# Patient Record
Sex: FEMALE | Race: WHITE | Employment: OTHER | URBAN - NONMETROPOLITAN AREA
[De-identification: names, ages, dates, MRNs, and addresses within clinical notes are randomized per-mention and may not be internally consistent; named-entity substitution may affect disease eponyms.]

---

## 2017-07-12 ENCOUNTER — OFFICE VISIT (OUTPATIENT)
Dept: ORTHOPEDIC SURGERY | Age: 67
End: 2017-07-12

## 2017-07-12 VITALS
WEIGHT: 157 LBS | DIASTOLIC BLOOD PRESSURE: 78 MMHG | SYSTOLIC BLOOD PRESSURE: 123 MMHG | BODY MASS INDEX: 26.8 KG/M2 | HEIGHT: 64 IN

## 2017-07-12 DIAGNOSIS — M25.512 ACUTE PAIN OF LEFT SHOULDER: Primary | ICD-10-CM

## 2017-07-12 PROCEDURE — 73030 X-RAY EXAM OF SHOULDER: CPT | Performed by: ORTHOPAEDIC SURGERY

## 2017-07-12 PROCEDURE — 99204 OFFICE O/P NEW MOD 45 MIN: CPT | Performed by: ORTHOPAEDIC SURGERY

## 2017-07-12 RX ORDER — DIAZEPAM 5 MG
5 TABLET ORAL SEE ADMIN INSTRUCTIONS
Qty: 5 TABLET | Refills: 0 | Status: SHIPPED | OUTPATIENT
Start: 2017-07-12 | End: 2017-07-13

## 2017-07-12 RX ORDER — IBUPROFEN 400 MG/1
400 TABLET ORAL EVERY 6 HOURS PRN
COMMUNITY
Start: 2017-05-30 | End: 2018-03-06

## 2017-07-12 RX ORDER — ZOLPIDEM TARTRATE 10 MG/1
TABLET ORAL
COMMUNITY
Start: 2017-07-01 | End: 2018-03-06

## 2017-07-12 RX ORDER — METHOCARBAMOL 750 MG/1
TABLET, FILM COATED ORAL 3 TIMES DAILY PRN
COMMUNITY
Start: 2017-07-01 | End: 2018-10-02

## 2017-07-12 RX ORDER — HYDROCODONE BITARTRATE AND ACETAMINOPHEN 10; 325 MG/1; MG/1
TABLET ORAL
COMMUNITY
Start: 2017-05-31 | End: 2017-10-02 | Stop reason: ALTCHOICE

## 2017-07-13 DIAGNOSIS — M25.512 ACUTE PAIN OF LEFT SHOULDER: Primary | ICD-10-CM

## 2017-07-17 ENCOUNTER — OFFICE VISIT (OUTPATIENT)
Dept: ORTHOPEDIC SURGERY | Age: 67
End: 2017-07-17

## 2017-07-17 VITALS — BODY MASS INDEX: 26.8 KG/M2 | HEIGHT: 64 IN | WEIGHT: 157 LBS

## 2017-07-17 DIAGNOSIS — M75.122 COMPLETE TEAR OF LEFT ROTATOR CUFF: ICD-10-CM

## 2017-07-17 DIAGNOSIS — S43.432D SUPERIOR GLENOID LABRUM LESION OF LEFT SHOULDER, SUBSEQUENT ENCOUNTER: Primary | ICD-10-CM

## 2017-07-17 PROBLEM — S43.432A SUPERIOR GLENOID LABRUM LESION OF LEFT SHOULDER: Status: ACTIVE | Noted: 2017-07-17

## 2017-07-17 PROCEDURE — 1090F PRES/ABSN URINE INCON ASSESS: CPT | Performed by: ORTHOPAEDIC SURGERY

## 2017-07-17 PROCEDURE — G8427 DOCREV CUR MEDS BY ELIG CLIN: HCPCS | Performed by: ORTHOPAEDIC SURGERY

## 2017-07-17 PROCEDURE — 99214 OFFICE O/P EST MOD 30 MIN: CPT | Performed by: ORTHOPAEDIC SURGERY

## 2017-07-17 PROCEDURE — 4040F PNEUMOC VAC/ADMIN/RCVD: CPT | Performed by: ORTHOPAEDIC SURGERY

## 2017-07-17 PROCEDURE — 1123F ACP DISCUSS/DSCN MKR DOCD: CPT | Performed by: ORTHOPAEDIC SURGERY

## 2017-07-17 PROCEDURE — 3017F COLORECTAL CA SCREEN DOC REV: CPT | Performed by: ORTHOPAEDIC SURGERY

## 2017-07-17 PROCEDURE — G8399 PT W/DXA RESULTS DOCUMENT: HCPCS | Performed by: ORTHOPAEDIC SURGERY

## 2017-07-17 PROCEDURE — G8419 CALC BMI OUT NRM PARAM NOF/U: HCPCS | Performed by: ORTHOPAEDIC SURGERY

## 2017-07-17 PROCEDURE — 3014F SCREEN MAMMO DOC REV: CPT | Performed by: ORTHOPAEDIC SURGERY

## 2017-07-17 PROCEDURE — 1036F TOBACCO NON-USER: CPT | Performed by: ORTHOPAEDIC SURGERY

## 2017-07-31 RX ORDER — MELOXICAM 15 MG/1
15 TABLET ORAL DAILY
Qty: 30 TABLET | Refills: 3 | Status: SHIPPED | OUTPATIENT
Start: 2017-07-31 | End: 2017-12-11 | Stop reason: ALTCHOICE

## 2017-08-21 ENCOUNTER — OFFICE VISIT (OUTPATIENT)
Dept: ORTHOPEDIC SURGERY | Age: 67
End: 2017-08-21

## 2017-08-21 VITALS — HEIGHT: 64 IN | WEIGHT: 157 LBS | BODY MASS INDEX: 26.8 KG/M2

## 2017-08-21 DIAGNOSIS — M75.122 COMPLETE TEAR OF LEFT ROTATOR CUFF: ICD-10-CM

## 2017-08-21 DIAGNOSIS — S43.432D SUPERIOR GLENOID LABRUM LESION OF LEFT SHOULDER, SUBSEQUENT ENCOUNTER: Primary | ICD-10-CM

## 2017-08-21 PROCEDURE — 1090F PRES/ABSN URINE INCON ASSESS: CPT | Performed by: ORTHOPAEDIC SURGERY

## 2017-08-21 PROCEDURE — 3017F COLORECTAL CA SCREEN DOC REV: CPT | Performed by: ORTHOPAEDIC SURGERY

## 2017-08-21 PROCEDURE — 20610 DRAIN/INJ JOINT/BURSA W/O US: CPT | Performed by: ORTHOPAEDIC SURGERY

## 2017-08-21 PROCEDURE — G8399 PT W/DXA RESULTS DOCUMENT: HCPCS | Performed by: ORTHOPAEDIC SURGERY

## 2017-08-21 PROCEDURE — G8427 DOCREV CUR MEDS BY ELIG CLIN: HCPCS | Performed by: ORTHOPAEDIC SURGERY

## 2017-08-21 PROCEDURE — 1036F TOBACCO NON-USER: CPT | Performed by: ORTHOPAEDIC SURGERY

## 2017-08-21 PROCEDURE — 99214 OFFICE O/P EST MOD 30 MIN: CPT | Performed by: ORTHOPAEDIC SURGERY

## 2017-08-21 PROCEDURE — G8419 CALC BMI OUT NRM PARAM NOF/U: HCPCS | Performed by: ORTHOPAEDIC SURGERY

## 2017-08-21 PROCEDURE — 3014F SCREEN MAMMO DOC REV: CPT | Performed by: ORTHOPAEDIC SURGERY

## 2017-08-21 PROCEDURE — 4040F PNEUMOC VAC/ADMIN/RCVD: CPT | Performed by: ORTHOPAEDIC SURGERY

## 2017-08-21 PROCEDURE — 1123F ACP DISCUSS/DSCN MKR DOCD: CPT | Performed by: ORTHOPAEDIC SURGERY

## 2017-08-31 ENCOUNTER — TELEPHONE (OUTPATIENT)
Dept: ORTHOPEDIC SURGERY | Age: 67
End: 2017-08-31

## 2017-09-26 ENCOUNTER — HOSPITAL ENCOUNTER (OUTPATIENT)
Dept: SURGERY | Age: 67
Discharge: HOME | End: 2017-08-31
Attending: ORTHOPAEDIC SURGERY | Admitting: ORTHOPAEDIC SURGERY

## 2017-10-02 ENCOUNTER — OFFICE VISIT (OUTPATIENT)
Dept: ORTHOPEDIC SURGERY | Age: 67
End: 2017-10-02

## 2017-10-02 VITALS
SYSTOLIC BLOOD PRESSURE: 123 MMHG | WEIGHT: 156.97 LBS | DIASTOLIC BLOOD PRESSURE: 72 MMHG | BODY MASS INDEX: 26.8 KG/M2 | HEART RATE: 73 BPM | HEIGHT: 64 IN

## 2017-10-02 DIAGNOSIS — M54.2 NECK PAIN: ICD-10-CM

## 2017-10-02 DIAGNOSIS — M48.02 CERVICAL SPINAL STENOSIS: ICD-10-CM

## 2017-10-02 DIAGNOSIS — M50.30 DDD (DEGENERATIVE DISC DISEASE), CERVICAL: Primary | ICD-10-CM

## 2017-10-02 DIAGNOSIS — M47.812 SPONDYLOSIS OF CERVICAL REGION WITHOUT MYELOPATHY OR RADICULOPATHY: ICD-10-CM

## 2017-10-02 PROCEDURE — G8484 FLU IMMUNIZE NO ADMIN: HCPCS | Performed by: PHYSICAL MEDICINE & REHABILITATION

## 2017-10-02 PROCEDURE — G8399 PT W/DXA RESULTS DOCUMENT: HCPCS | Performed by: PHYSICAL MEDICINE & REHABILITATION

## 2017-10-02 PROCEDURE — 1123F ACP DISCUSS/DSCN MKR DOCD: CPT | Performed by: PHYSICAL MEDICINE & REHABILITATION

## 2017-10-02 PROCEDURE — 1036F TOBACCO NON-USER: CPT | Performed by: PHYSICAL MEDICINE & REHABILITATION

## 2017-10-02 PROCEDURE — 3014F SCREEN MAMMO DOC REV: CPT | Performed by: PHYSICAL MEDICINE & REHABILITATION

## 2017-10-02 PROCEDURE — G8417 CALC BMI ABV UP PARAM F/U: HCPCS | Performed by: PHYSICAL MEDICINE & REHABILITATION

## 2017-10-02 PROCEDURE — G8427 DOCREV CUR MEDS BY ELIG CLIN: HCPCS | Performed by: PHYSICAL MEDICINE & REHABILITATION

## 2017-10-02 PROCEDURE — 4040F PNEUMOC VAC/ADMIN/RCVD: CPT | Performed by: PHYSICAL MEDICINE & REHABILITATION

## 2017-10-02 PROCEDURE — 1090F PRES/ABSN URINE INCON ASSESS: CPT | Performed by: PHYSICAL MEDICINE & REHABILITATION

## 2017-10-02 PROCEDURE — 99203 OFFICE O/P NEW LOW 30 MIN: CPT | Performed by: PHYSICAL MEDICINE & REHABILITATION

## 2017-10-02 PROCEDURE — 3017F COLORECTAL CA SCREEN DOC REV: CPT | Performed by: PHYSICAL MEDICINE & REHABILITATION

## 2017-10-02 PROCEDURE — 72040 X-RAY EXAM NECK SPINE 2-3 VW: CPT | Performed by: PHYSICAL MEDICINE & REHABILITATION

## 2017-10-02 RX ORDER — HYDROCODONE BITARTRATE AND ACETAMINOPHEN 7.5; 325 MG/1; MG/1
TABLET ORAL
COMMUNITY
Start: 2017-09-27 | End: 2018-03-06

## 2017-10-02 RX ORDER — DIAZEPAM 5 MG/1
TABLET ORAL
Qty: 1 TABLET | Refills: 0 | Status: SHIPPED | OUTPATIENT
Start: 2017-10-02 | End: 2018-02-28

## 2017-10-02 NOTE — MR AVS SNAPSHOT
estimate of body fat, calculated from your height and weight. The higher your BMI, the greater your risk of heart disease, high blood pressure, type 2 diabetes, stroke, gallstones, arthritis, sleep apnea, and certain cancers. BMI is not perfect. It may overestimate body fat in athletes and people who are more muscular. If your body fat is high you can improve your BMI by decreasing your calorie intake and becoming more physically active. Learn more at: [employer]             Today's Medication Changes          These changes are accurate as of: 10/2/17  2:51 PM.  If you have any questions, ask your nurse or doctor. START taking these medications           diazepam 5 MG tablet   Commonly known as:  VALIUM   Instructions:  TAKE 1 TAB PO 30 MINUTES PRIOR TO MRI. Quantity:  1 tablet   Refills:  0   Started by: Myles Ashley MD            Where to Get Your Medications      You can get these medications from any pharmacy     Bring a paper prescription for each of these medications     diazepam 5 MG tablet               Your Current Medications Are              HYDROcodone-acetaminophen (NORCO) 7.5-325 MG per tablet . diazepam (VALIUM) 5 MG tablet TAKE 1 TAB PO 30 MINUTES PRIOR TO MRI.    meloxicam (MOBIC) 15 MG tablet Take 1 tablet by mouth daily    ibuprofen (ADVIL;MOTRIN) 400 MG tablet     methocarbamol (ROBAXIN) 750 MG tablet     zolpidem (AMBIEN) 10 MG tablet     DULoxetine (CYMBALTA) 30 MG capsule Take 30 mg by mouth daily. methocarbamol (ROBAXIN) 500 MG tablet Take 500 mg by mouth 4 times daily. Zolpidem Tartrate (AMBIEN PO) Take  by mouth.       Allergies              Codeine Nausea Only    Sulfa Antibiotics Hives      We Ordered/Performed the following           Cervical spine flexion and extension only     Comments:    [id number]    MRI Cervical Spine WO Contrast     Scheduling Instructions: MRI CSP W/O CONTRAST - [facility] 10/03/17 @ 9:00AM, ARRIVE 8:30  R/O DISC HERNIATION  NPR         Result Summary for Cervical spine flexion and extension only      Result Information     Status          Final result (Exam End: 10/2/2017  2:09 PM)           10/2/2017  2:09 PM      Narrative & Impression           Radiology result is complete; follow up with provider / physician office for radiology results                       Additional Information        Basic Information     Date Of Birth Sex Race Ethnicity Preferred Language    1950 Female White Non-/Non  English      Problem List as of 10/2/2017  Date Reviewed: 10/2/2017                Superior glenoid labrum lesion of left shoulder    Complete tear of left rotator cuff      Preventive Care        Date Due    Hepatitis C screening is recommended for all adults regardless of risk factors born between [facility] at least once (lifetime) who have never been tested. 1950    Tetanus Combination Vaccine (1 - Tdap) 8/1/1970    Diabetes Screening 8/1/1991    Colonoscopy 8/1/2001    Zoster Vaccine 8/1/2011    Cholesterol Screening 12/1/2015    Pneumococcal Vaccines (two) for all adults aged 72 and over (1 of 2 - PCV13) 8/1/2016    Yearly Flu Vaccine (1) 9/1/2017    Mammograms are recommended every 2 years for low/average risk patients aged 48 - 69, and every year for high risk patients per updated national guidelines. However these guidelines can be individualized by your provider. 10/19/2017            "[employer]" Signup           [employer] allows you to send messages to your doctor, view your test results, renew your prescriptions, schedule appointments, view visit notes, and more. How Do I Sign Up? 1. In your Internet browser, go to [email or web address]  2. Click on the Sign Up Now link in the Sign In box. You will see the New Member Sign Up page.

## 2017-10-02 NOTE — PROGRESS NOTES
EXAM:  · General Apparence: Patient is adequately groomed with no evidence of malnutrition. · Psychiatric: Orientation: The patient is oriented to time, place and person. The patient's mood and affect are appropriate   · Vascular: Examination reveals no swelling and palpation reveals no tenderness in upper or lower extremities. Good capillary refill. · The lymphatic examination of the neck, axillae and groin reveals all areas to be without enlargement or induration   Sensation is intact without deficit in the upper and lower extremities to light touch and pinprick  · Coordination of the upper and lower extremities are normal.    CERVICAL EXAMINATION:  · Inspection: Local inspection shows no step-off or bruising. Cervical alignment is normal. No instability is noted. · Palpation and Percussion: No evidence of tenderness at the midline, and trapezius. Paraspinal tenderness is not present. There is no paraspinal spasm. · Range of Motion:  limited by 25% in all planes due to pain   · Strength: 5/5 bilateral upper extremities  · Special Tests:   Spurling's and Yadav's are negative bilaterally. Kuo and Impingement tests are negative bilaterally. · Skin:There are no rashes, ulcerations or lesions in right & left upper extremities. · Reflexes: Bilaterally triceps, biceps and brachioradialis are 2+. Clonus absent bilaterally at the feet. No pathological reflexes are noted. · Gait & station: normal, patient ambulates without assistance  · Additional Examinations:  · RIGHT UPPER EXTREMITY:  Inspection/examination of the right upper extremity does not show any tenderness, deformity or injury. Range of motion is normal and pain-free. There is no gross instability. There are no rashes, ulcerations or lesions. Strength and tone are normal. No atrophy or abnormal movements are noted. · LEFT UPPER EXTREMITY: Inspection/examination of the left upper extremity does not show any tenderness, deformity or injury. hospital encounter of 09/06/14   CBC Auto Differential   Result Value Ref Range    WBC 9.2 4.0 - 11.0 K/uL    RBC 3.92 (L) 4.00 - 5.20 M/uL    Hemoglobin 12.8 12.0 - 16.0 g/dL    Hematocrit 37.3 36.0 - 48.0 %    MCV 95.2 80.0 - 100.0 fL    MCH 32.5 26.0 - 34.0 pg    MCHC 34.1 31.0 - 36.0 g/dL    RDW 13.4 12.4 - 15.4 %    Platelets 129 354 - 022 K/uL    MPV 7.8 5.0 - 10.5 fL    Neutrophils % 61.4 %    Lymphocytes % 27.6 %    Monocytes % 8.7 %    Eosinophils % 1.8 %    Basophils % 0.5 %    Neutrophils # 5.6 1.7 - 7.7 K/uL    Lymphocytes # 2.5 1.0 - 5.1 K/uL    Monocytes # 0.8 0.0 - 1.3 K/uL    Eosinophils # 0.2 0.0 - 0.6 K/uL    Basophils # 0.0 0.0 - 0.2 K/uL   Comprehensive Metabolic Panel   Result Value Ref Range    Sodium 138 136 - 145 mmol/L    Potassium 3.6 3.5 - 5.1 mmol/L    Chloride 100 99 - 110 mmol/L    CO2 27 21 - 32 mmol/L    Glucose 118 (H) 70 - 99 mg/dL    BUN 12 7 - 20 mg/dL    CREATININE 0.7 0.6 - 1.2 mg/dL    GFR Non-African American >60 >60    GFR African American >60 >60    Calcium 9.4 8.3 - 10.6 mg/dL    Total Protein 7.1 6.4 - 8.2 g/dL    Alb 4.4 3.4 - 5.0 g/dL    Albumin/Globulin Ratio 1.6 1.1 - 2.2    Total Bilirubin <0.2 0.0 - 1.0 mg/dL    Alkaline Phosphatase 40 40 - 129 U/L    ALT 13 10 - 40 U/L    AST 15 15 - 37 U/L    Globulin 2.7 g/dL   Troponin   Result Value Ref Range    Troponin <0.01 <0.01 ng/mL   EKG 12 Lead   Result Value Ref Range    Ventricular Rate 72 BPM    Atrial Rate 72 BPM    P-R Interval 144 ms    QRS Duration 76 ms    Q-T Interval 404 ms    QTc Calculation (Bazett) 442 ms    P Axis 57 degrees    R Axis 32 degrees    T Axis 31 degrees    Diagnosis       Normal sinus rhythmNormal ECGConfirmed by Denver Cross MD, JEFF ([id number]) on 9/7/2014 11:52:15 AM       Impression (Medical Decision Making):       1. DDD (degenerative disc disease), cervical    2. Spondylosis of cervical region without myelopathy or radiculopathy    3. Cervical spinal stenosis    4.  Neck pain        Plan (Medical Decision Making):    1. Medications:  Continue current regimen. 2. PT:  Encouraged to continue with HEP. 3. Further studies:  Setup for Cervical MR without contrast to evaluate for soft tissue pathology or stenosis contributing to the neck pain and paresthesia. The patient has failed a six week trial of a HEP program within the last 6 months. 4. Interventional:  After further imaging is obtained, interventional options will be reviewed and recommended. 5. Healthy Lifestyle Measures:  Patient education material - Anatomic drawings, healthy lifestyle education, osteoporosis prevention, back and neck pain educational information, and advanced imaging preparedness were distributed to the patient. Posture education, proper lifting and carrying techniques, weight control, quitting smoking and minor ways to treat back pain were reviewed and distributed. For further information regarding the spine conditions and to review interventional treatments the patient was directed to [employer].  6.  Follow up:  2-3 weeks        Jairo Streeter MD, LILLIAM, [facility]  Board Certified in [address] [place] [address]  Certified and Fellowship Trained in [facility] ([facility])     This dictation was performed with a verbal recognition program [facility]) and it was checked for errors. It is possible that there are still dictated errors within this office note. If so, please bring any errors to my attention for an addendum. All efforts were made to ensure that this office note is accurate.

## 2017-10-03 ENCOUNTER — HOSPITAL ENCOUNTER (OUTPATIENT)
Dept: MRI IMAGING | Age: 67
Discharge: HOME | End: 2017-10-03
Attending: PHYSICAL MEDICINE & REHABILITATION | Admitting: PHYSICAL MEDICINE & REHABILITATION

## 2017-10-03 DIAGNOSIS — M54.2 NECK PAIN: ICD-10-CM

## 2017-10-03 DIAGNOSIS — M47.812 SPONDYLOSIS OF CERVICAL REGION WITHOUT MYELOPATHY OR RADICULOPATHY: ICD-10-CM

## 2017-10-03 DIAGNOSIS — M50.30 OTHER CERVICAL DISC DEGENERATION, UNSPECIFIED CERVICAL REGION: ICD-10-CM

## 2017-10-03 DIAGNOSIS — M50.30 DDD (DEGENERATIVE DISC DISEASE), CERVICAL: ICD-10-CM

## 2017-10-03 DIAGNOSIS — M48.02 CERVICAL SPINAL STENOSIS: ICD-10-CM

## 2017-10-06 ENCOUNTER — OFFICE VISIT (OUTPATIENT)
Dept: ORTHOPEDIC SURGERY | Age: 67
End: 2017-10-06

## 2017-10-06 VITALS — HEIGHT: 64 IN | WEIGHT: 156.97 LBS | BODY MASS INDEX: 26.8 KG/M2

## 2017-10-06 DIAGNOSIS — M47.812 SPONDYLOSIS OF CERVICAL REGION WITHOUT MYELOPATHY OR RADICULOPATHY: ICD-10-CM

## 2017-10-06 DIAGNOSIS — M50.30 DDD (DEGENERATIVE DISC DISEASE), CERVICAL: Primary | ICD-10-CM

## 2017-10-06 DIAGNOSIS — M48.02 CERVICAL STENOSIS OF SPINE: ICD-10-CM

## 2017-10-06 PROCEDURE — 3014F SCREEN MAMMO DOC REV: CPT | Performed by: PHYSICAL MEDICINE & REHABILITATION

## 2017-10-06 PROCEDURE — 4040F PNEUMOC VAC/ADMIN/RCVD: CPT | Performed by: PHYSICAL MEDICINE & REHABILITATION

## 2017-10-06 PROCEDURE — 3017F COLORECTAL CA SCREEN DOC REV: CPT | Performed by: PHYSICAL MEDICINE & REHABILITATION

## 2017-10-06 PROCEDURE — G8399 PT W/DXA RESULTS DOCUMENT: HCPCS | Performed by: PHYSICAL MEDICINE & REHABILITATION

## 2017-10-06 PROCEDURE — 1123F ACP DISCUSS/DSCN MKR DOCD: CPT | Performed by: PHYSICAL MEDICINE & REHABILITATION

## 2017-10-06 PROCEDURE — G8427 DOCREV CUR MEDS BY ELIG CLIN: HCPCS | Performed by: PHYSICAL MEDICINE & REHABILITATION

## 2017-10-06 PROCEDURE — G8417 CALC BMI ABV UP PARAM F/U: HCPCS | Performed by: PHYSICAL MEDICINE & REHABILITATION

## 2017-10-06 PROCEDURE — 1090F PRES/ABSN URINE INCON ASSESS: CPT | Performed by: PHYSICAL MEDICINE & REHABILITATION

## 2017-10-06 PROCEDURE — G8484 FLU IMMUNIZE NO ADMIN: HCPCS | Performed by: PHYSICAL MEDICINE & REHABILITATION

## 2017-10-06 PROCEDURE — 1036F TOBACCO NON-USER: CPT | Performed by: PHYSICAL MEDICINE & REHABILITATION

## 2017-10-06 PROCEDURE — 99213 OFFICE O/P EST LOW 20 MIN: CPT | Performed by: PHYSICAL MEDICINE & REHABILITATION

## 2017-10-06 NOTE — PROGRESS NOTES
Follow up: SPINE    CHIEF COMPLAINT:    Chief Complaint   Patient presents with    Neck Pain     MRI CSP Results       HISTORY OF PRESENT ILLNESS:      · Follow up spine patient for neck pain and left arm pain. The patient is a 78 y.o. female whom reports she continues to have worsening neck and left arm pain. She returns after undergoing an MRI of  The cervical spine. She is currently taking Vicodin as well as muscle relaxers. She reports the Vicodin allows her to function. She is also taking muscle relaxers which make her quite sleepy. Pain Assessment  Location of Pain: Neck  Location Modifiers: Posterior  Severity of Pain: 4  Quality of Pain: Aching (Burning)  Duration of Pain: Persistent  Frequency of Pain: Intermittent  Aggravating Factors:  Other (Comment) (Same position for too long of period)  Limiting Behavior: Yes  Relieving Factors: Rest  Result of Injury: No  Work-Related Injury: No  Are there other pain locations you wish to document?: No      Associated signs and symptoms:   Neurogenic bowel or bladder symptoms:  no   Perceived weakness:  no   Difficulty walking:  no    Recent Imaging (within past one year)   Xrays: no   MRI or CT of spine: yes    Current/Past Treatment:   · Physical Therapy:  none  · Chiropractic:  none  · Injection:  none  · Medications:   NSAIDS:  yes   Muscle relaxer:  yes   Steriods:  yes   Neuropathic medications:  none   Opioids:  vicodin  · Previous surgery:  no  · Previous surgical consult:  no  · Other:  · Infection control  · Tested positive for MRSA in past 12 months:  no  · Tested positive for MSSA \"staph infection\" in past 12 months: no  · Tested positive for VRE (Vancomycin Resistant Enterococci) in past 12 months:   no  · Currently on any antibiotics for an infection: no  · Anticoagulants:  · On a blood thinner:  yes   · Any history of bleeding disorder: no   · MRI Contraindication: no   · Previous Pain Management: no                   Past Medical History: pinprick  · Coordination of the upper and lower extremities are normal.    CERVICAL EXAMINATION:  · Inspection: Local inspection shows no step-off or bruising. Cervical alignment is normal. No instability is noted. · Palpation and Percussion: No evidence of tenderness at the midline, and trapezius. Paraspinal tenderness is not present. There is no paraspinal spasm. · Range of Motion:  limited by 25% in all planes due to pain   · Strength: 5/5 bilateral upper extremities  · Special Tests:   Spurling's and Yadav's are negative bilaterally. Kuo and Impingement tests are negative bilaterally. · Skin:There are no rashes, ulcerations or lesions in right & left upper extremities. · Reflexes: Bilaterally triceps, biceps and brachioradialis are 2+. Clonus absent bilaterally at the feet. No pathological reflexes are noted. · Gait & station: normal, patient ambulates without assistance  · Additional Examinations:  · RIGHT UPPER EXTREMITY:  Inspection/examination of the right upper extremity does not show any tenderness, deformity or injury. Range of motion is normal and pain-free. There is no gross instability. There are no rashes, ulcerations or lesions. Strength and tone are normal. No atrophy or abnormal movements are noted. · LEFT UPPER EXTREMITY: Inspection/examination of the left upper extremity does not show any tenderness, deformity or injury. Range of motion is normal and pain-free. There is no gross instability. There are no rashes, ulcerations or lesions. Strength and tone are normal. No atrophy or abnormal movements are noted. Diagnostic Testing:    Xrays:   None  MRI or CT:  MR Cervical 10/3/17   Mild spinal canal stenosis without cord compression at C5-6 and C6-7.       Substantial multilevel neural foraminal narrowing.        EMG:  None  Results for orders placed or performed during the hospital encounter of 09/06/14   CBC Auto Differential   Result Value Ref Range    WBC 9.2 4.0 - 11.0 K/uL    RBC 3.92 (L) 4.00 - 5.20 M/uL    Hemoglobin 12.8 12.0 - 16.0 g/dL    Hematocrit 37.3 36.0 - 48.0 %    MCV 95.2 80.0 - 100.0 fL    MCH 32.5 26.0 - 34.0 pg    MCHC 34.1 31.0 - 36.0 g/dL    RDW 13.4 12.4 - 15.4 %    Platelets 241 125 - 746 K/uL    MPV 7.8 5.0 - 10.5 fL    Neutrophils % 61.4 %    Lymphocytes % 27.6 %    Monocytes % 8.7 %    Eosinophils % 1.8 %    Basophils % 0.5 %    Neutrophils # 5.6 1.7 - 7.7 K/uL    Lymphocytes # 2.5 1.0 - 5.1 K/uL    Monocytes # 0.8 0.0 - 1.3 K/uL    Eosinophils # 0.2 0.0 - 0.6 K/uL    Basophils # 0.0 0.0 - 0.2 K/uL   Comprehensive Metabolic Panel   Result Value Ref Range    Sodium 138 136 - 145 mmol/L    Potassium 3.6 3.5 - 5.1 mmol/L    Chloride 100 99 - 110 mmol/L    CO2 27 21 - 32 mmol/L    Glucose 118 (H) 70 - 99 mg/dL    BUN 12 7 - 20 mg/dL    CREATININE 0.7 0.6 - 1.2 mg/dL    GFR Non-African American >60 >60    GFR African American >60 >60    Calcium 9.4 8.3 - 10.6 mg/dL    Total Protein 7.1 6.4 - 8.2 g/dL    Alb 4.4 3.4 - 5.0 g/dL    Albumin/Globulin Ratio 1.6 1.1 - 2.2    Total Bilirubin <0.2 0.0 - 1.0 mg/dL    Alkaline Phosphatase 40 40 - 129 U/L    ALT 13 10 - 40 U/L    AST 15 15 - 37 U/L    Globulin 2.7 g/dL   Troponin   Result Value Ref Range    Troponin <0.01 <0.01 ng/mL   EKG 12 Lead   Result Value Ref Range    Ventricular Rate 72 BPM    Atrial Rate 72 BPM    P-R Interval 144 ms    QRS Duration 76 ms    Q-T Interval 404 ms    QTc Calculation (Bazett) 442 ms    P Axis 57 degrees    R Axis 32 degrees    T Axis 31 degrees    Diagnosis       Normal sinus rhythmNormal ECGConfirmed by Nyla Ross MD, [facility] ([facility]) on 9/7/2014 11:52:15 AM       Impression (Medical Decision Making):       1. DDD (degenerative disc disease), cervical    2. Cervical stenosis of spine    3. Spondylosis of cervical region without myelopathy or radiculopathy        Plan (Medical Decision Making):    1. Medications:  Continue current regimen.   2. PT:  I will start the patient on a trial of PT to work on a cervical stabilization program to focus on stretching, strengthening, traction and modalities as indicated. 3. Further studies:  None at this time  4. Interventional:  We discussed pursuing a C6/7 IL epidural steroid injection to address the pain. Radiologic imaging and symptoms confirm the pain etiology. Risks, benefits and alternatives of interventional options were discussed. These include and are not limited to bleeding, infection, spinal headache, nerve injury, increased pain and lack of pain relief. The patient verbalized understanding and would like to proceed. The patient will be scheduled accordingly. 5. Healthy Lifestyle Measures:  Patient education material - Anatomic drawings, healthy lifestyle education, osteoporosis prevention, back and neck pain educational information, and advanced imaging preparedness were distributed to the patient. Posture education, proper lifting and carrying techniques, weight control, quitting smoking and minor ways to treat back pain were reviewed and distributed. For further information regarding the spine conditions and to review interventional treatments the patient was directed to [employer].  6.  Follow up:  4-6 weeks        Sindhu. Brenda Amador MD, LILLIAM, [facility]  Board Certified in [address] [place] [address]  Certified and Fellowship Trained in [facility] ([facility])     This dictation was performed with a verbal recognition program [facility]) and it was checked for errors. It is possible that there are still dictated errors within this office note. If so, please bring any errors to my attention for an addendum. All efforts were made to ensure that this office note is accurate.

## 2017-10-06 NOTE — LETTER
Please schedule the following with:     Date:       Account: [de-identified]  Patient: Amber Myers    : 1950  Address:  [address] [place]    Phone (H):  [phone] (home) [phone] (work)     ----------------------------------------------------------------------------------------------  Diagnosis:     ICD-10-CM ICD-9-CM    1. DDD (degenerative disc disease), cervical M50.30 722.4 Ambulatory referral to Physical Therapy   2. Cervical stenosis of spine M48.02 723.0 Ambulatory referral to Physical Therapy   3. Spondylosis of cervical region without myelopathy or radiculopathy M47.812 721.0 Ambulatory referral to Physical Therapy         Levels: C6/C7  midline  Interlaminar VANESSA    ----------------------------------------------------------------------------------------------  Injection #   [id number]    Attending Physician       Beba Ariza.  Terence Reza MD.      ----------------------------------------------------------------------------------------------  Injection Scheduled For:    At:    1st Insurance:     Pre-Cert#    2nd Insurance:    Pre-Cert#    Comments:    · Infection control  · Tested positive for MRSA in past 12 months:  no  · Tested positive for MSSA \"staph infection\" in past 12 months: no  · Tested positive for VRE (Vancomycin Resistant Enterococci) in past 12 months:   no  · Currently on any antibiotics for an infection: no  · Anticoagulants:  · On a blood thinner:  no   · Any history of bleeding disorder: no   · Advanced Liver disease: no   · Advanced Renal disease: no   · Glaucoma: no   · Diabetes: no     Sedation:  Yes  -----------------------------------------------------------------------------------------------  Allergies   Allergen Reactions    Codeine Nausea Only    Sulfa Antibiotics Hives

## 2017-10-06 NOTE — MR AVS SNAPSHOT
After Visit Summary             Wilner Dejesus   10/6/2017 11:15 AM   Office Visit    Description:  Female : 1950   Provider:  Danis Eisenberg MD   Department:  [facility]              Your Follow-Up and Future Appointments         Below is a list of your follow-up and future appointments. This may not be a complete list as you may have made appointments directly with providers that we are not aware of or your providers may have made some for you. Please call your providers to confirm appointments. It is important to keep your appointments. Please bring your current insurance card, photo ID, co-pay, and all medication bottles to your appointment. If self-pay, payment is expected at the time of service. Your To-Do List     Future Appointments Provider Department Dept Phone    2017 1:00 PM Mallory Lazo [facility] [phone]    Please arrive 15 minutes prior to appointment, bring photo ID and insurance card. Follow-Up    Return in about 4 weeks (around 11/3/2017) for INJECTION F/U . Information from Your Visit        Department     Name Address Phone Fax    [facility] [address] [facility] [place] [address] [phone]      You Were Seen for:         Comments    DDD (degenerative disc disease), cervical   [[id number]]         Vital Signs     Height Weight Body Mass Index Smoking Status          5' 4.17\" (1.63 m) 156 lb 15.5 oz (71.2 kg) 26.8 kg/m2 Never Smoker        Additional Information about your Body Mass Index (BMI)           Your BMI as listed above is considered overweight (25.0-29.9). BMI is an estimate of body fat, calculated from your height and weight.   The higher your BMI, the greater your risk of heart disease, high blood pressure, type 2 diabetes, stroke, gallstones, arthritis, sleep apnea, and certain cancers. BMI is not perfect. It may overestimate body fat in athletes and people who are more muscular. If your body fat is high you can improve your BMI by decreasing your calorie intake and becoming more physically active. Learn more at: [employer]             Medications and Orders      Your Current Medications Are              HYDROcodone-acetaminophen (NORCO) 7.5-325 MG per tablet . diazepam (VALIUM) 5 MG tablet TAKE 1 TAB PO 30 MINUTES PRIOR TO MRI.    meloxicam (MOBIC) 15 MG tablet Take 1 tablet by mouth daily    ibuprofen (ADVIL;MOTRIN) 400 MG tablet     methocarbamol (ROBAXIN) 750 MG tablet     zolpidem (AMBIEN) 10 MG tablet     DULoxetine (CYMBALTA) 30 MG capsule Take 30 mg by mouth daily. methocarbamol (ROBAXIN) 500 MG tablet Take 500 mg by mouth 4 times daily. Zolpidem Tartrate (AMBIEN PO) Take  by mouth. Allergies              Codeine Nausea Only    Sulfa Antibiotics Hives      We Ordered/Performed the following           Ambulatory referral to Physical Therapy     Scheduling Instructions:    Diag:  Cervical Strain    Cervical Exercise, Cervicothoracic Stabilization, Myofacial Release, Manual Therapy Techniques, Stretching/Flexibility, Muscle Energy    2-3 days/week for 4-6 weeks      [facility] and [address] [place] [address], 0 [facility], [facility]   [phone]    Comments: The patient can be scheduled with any member of the group, including the provider with the first available appointments.          Additional Information        Basic Information     Date Of Birth Sex Race Ethnicity Preferred Language    1950 Female White Non-/Non  English      Problem List as of 10/6/2017  Date Reviewed: 10/2/2017                Superior glenoid labrum lesion of left shoulder    Complete tear of left rotator cuff      Preventive Care        Date Due

## 2017-10-09 ENCOUNTER — TELEPHONE (OUTPATIENT)
Dept: ORTHOPEDIC SURGERY | Age: 67
End: 2017-10-09

## 2017-10-16 ENCOUNTER — HOSPITAL ENCOUNTER (OUTPATIENT)
Dept: PAIN MANAGEMENT | Age: 67
Discharge: HOME | End: 2017-10-16
Attending: PHYSICAL MEDICINE & REHABILITATION | Admitting: PHYSICAL MEDICINE & REHABILITATION

## 2017-10-16 VITALS
HEART RATE: 61 BPM | WEIGHT: 159 LBS | DIASTOLIC BLOOD PRESSURE: 78 MMHG | HEIGHT: 64 IN | SYSTOLIC BLOOD PRESSURE: 139 MMHG | RESPIRATION RATE: 18 BRPM | OXYGEN SATURATION: 100 % | BODY MASS INDEX: 27.14 KG/M2

## 2017-10-16 ASSESSMENT — PAIN DESCRIPTION - DESCRIPTORS: DESCRIPTORS: DULL;THROBBING;SHARP

## 2017-10-16 ASSESSMENT — PAIN - FUNCTIONAL ASSESSMENT
PAIN_FUNCTIONAL_ASSESSMENT: 0-10
PAIN_FUNCTIONAL_ASSESSMENT: 0-10

## 2017-10-16 NOTE — PROGRESS NOTES
IV discontinued, catheter intact, and dressing applied. Procedural dressing dry and intact. Bilateral upper, lower extremities equal in strength. Discharge instructions reviewed with patient or responsible adult, signed and copy given. All home medications have been reviewed. All questions answered and patient or responsible adult verbalized understanding.

## 2017-10-20 ENCOUNTER — HOSPITAL ENCOUNTER (OUTPATIENT)
Dept: PHYSICAL THERAPY | Age: 67
Discharge: HOME | End: 2017-10-31
Admitting: PHYSICAL MEDICINE & REHABILITATION

## 2017-10-20 DIAGNOSIS — Z78.0 ASYMPTOMATIC POSTMENOPAUSAL STATUS: ICD-10-CM

## 2017-10-20 NOTE — PROGRESS NOTES
Physical Therapy  Initial Assessment  Date: 10/20/2017  Patient Name: Mitul Hassan  MRN: [id number]  : 1950     Treatment Diagnosis: Decreased flexib, strength neck and gerardo, increased tone and tender upper back and neck mm. Restrictions    Outpatient fall risk assessment completed asking screening question if patient has fallen in the past 30 days:  [x] Yes  [] No    Based on screen for falls, patient demonstrates fall risk:  [] Yes  [x] No    Interventions based on fall risk status:  Updated Problem List within Medical History  [] Yes   [x] N/A    Asked family to assist with increased observation of the patient  [] Yes   [x] N/A    Patient kept in visible area when not closely supervised by therapist  [] Yes   [x] N/A    Repeatedly reinforce activity limits and safety needs with patient/family  [] Yes   [x] N/A    Increase frequency of rounding/monitoring patient  [] Yes   [x] N/A        Subjective   General  Chart Reviewed: Yes  Family / Caregiver Present: No  Referring Practitioner: Dr. Kushal Vuong  Referral Date : 10/06/17  Diagnosis: cervical DDD, M50.30, cervical stenosis M48.02, Spondylosis of cervical region without myelopathy of radic M47.812  Follows Commands: Within Functional Limits  PT Visit Information  Onset Date: 17  PT Insurance Information: Medicare $0, secondary AARP  Total # of Visits Approved: 12  Total # of Visits to Date: 1  Subjective  Subjective: Hx of fibromyalgia, R foot fx from hiking 2017, L RTC tear from fall 17 which started neck and gerardo pain. CLOF: Pt reports hx of neck pain over the years intermittent but worse now with L RTC tear and fall. Pain 3-7/10 most weeks. Driving, sleep, lifting, housework makes pain work. PLOF: Pt was able to be more active, slept better, pain intermittent 0-5/10.        Vision/Hearing       Orientation  Orientation  Overall Orientation Status: Within Functional Limits    Social/Functional History  Social/Functional History  Lives strength neck and gerardo, increased tone and tender upper back and neck mm. Prognosis: Good  Decision Making: Low Complexity  Patient Education: see DN  Barriers to Learning: no  REQUIRES PT FOLLOW UP: Yes  Activity Tolerance  Activity Tolerance: Patient Tolerated treatment well         Plan   Plan  Times per week: 2x weeks  Plan weeks: 6 weeks  Current Treatment Recommendations: Strengthening, ROM, Manual Therapy - Soft Tissue Mobilization, Pain Management, Manual Therapy - Joint Manipulation, Home Exercise Program, Patient/Caregiver Education & Training, Modalities  Plan Comment: Pt agree with POC. G-Code  PT G-Codes  Functional Assessment Tool Used: NDI  Score: 30  Functional Limitation: Carrying, moving and handling objects  Carrying, Moving and Handling Objects Current Status (): At least 60 percent but less than 80 percent impaired, limited or restricted  Carrying, Moving and Handling Objects Goal Status (): 0 percent impaired, limited or restricted    OutComes Score     Neck Disability Index Raw Score: 30                                               Goals  Long term goals  Time Frame for Long term goals : 6-8 weeks  Long term goal 1: Pain neck/gerardo 0-3/10 for return to ex at gym. Long term goal 2: Pt AROM and strength WFL for return to normal activities and 0-3/10 pain for driving. Long term goal 3: Palp of cervical mm with minimal to normal increase tone and tender. Long term goal 4: Pt indep with HEP and workout in  for D/C.   Patient Goals   Patient goals : No pain       Therapy Time   Individual Concurrent Group Co-treatment   Time In 1230         Time Out 1330         Minutes [address], [place]

## 2017-10-20 NOTE — FLOWSHEET NOTE
Physical Therapy Daily Treatment Note  Date:  10/20/2017    Patient Name:  Amber Myers    :  1950  MRN: [id number]  Restrictions/Precautions:  L RTC tear and may have surgery in future  Medical/Treatment Diagnosis Information:   · Diagnosis: cervical DDD, M50.30, cervical stenosis M48.02, Spondylosis of cervical region without myelopathy of radic M47.812  · Treatment Diagnosis: Decreased flexib, strength neck and gerardo, increased tone and tender upper back and neck mm. Tracking Information:  Physician Information Referring Practitioner: Dr. Rosa Beckman of Care Sent Date: 10/20 Signed Received:    Visit Count / Total Visits      Insurance Approved Visits  /  Approved Dates:     Insurance Information PT Insurance Information: Medicare $0, secondary AARP  *If only certain CPT codes approved use smart phrase CPT calculator . OPPTINSURANCECPTCODECALCULATOR   Progress Note/G-codes   [x]  Yes  []  No Next Due: 10     Pain level: 3-7/10     Subjective:  See eval    Objective: see eval  Observation:   Test measurements:      Exercises: Caution L UE RTC tear and may have surgery in future  Exercise/Equipment Resistance/Repetitions Other comments   UBE if able     pulleys     Cervical ex/posture                                                        HP Pt has [facility] but would like to learn HP and use 30 days free in future      Other Therapeutic Activities:  10/20/17 Pt was educated on PT POC, Diagnosis, Prognosis, pathomechanics as well as frequency and duration of scheduling future physical therapy appointments. Time was also taken on this day to answer all patient questions and participation in PT. Home Exercise Program:  10/20/17 Patient was educated on home exercise program including distrubution of handout describing exercises, sets, repetitions, frequency and intensity.  Exercises/activities include: 1x 5x slow supine lats stretch, seated cervical flex, sb, rot, gerardo rolls, scap sq, chin tuck. Manual Treatments:  10/20: supine with lg roll under knees and pillow under head gentle STM to UT, scalenes, SCM, suboccipital, deltoid, gentle manual cervical txn, prone STM to rhomboids. Modalities:  10/20: supine as above, IF estim and MHP to UT/shomboids 15 min.     Timed Code Treatment Minutes:  35    Total Treatment Minutes:  60    Treatment/Activity Tolerance:  [x] Patient tolerated treatment well [] Patient limited by fatigue  [] Patient limited by pain  [] Patient limited by other medical complications  [] Other:     Prognosis: [x] Good [] Fair  [] Poor    Patient Requires Follow-up: [x] Yes  [] No    Plan:   [] Continue per plan of care [] Alter current plan (see comments)  [x] Plan of care initiated [] Hold pending MD visit [] Discharge  Plan for Next Session:  Pt ed, HEP, manual prn, mod prn, flexib and strength ex, posture    Electronically signed by:  Guanako Denney, PT, DPT

## 2017-10-20 NOTE — PROGRESS NOTES
Outpatient Physical Therapy     Phone: [phone] Fax: [phone]     To: Referring Practitioner: Dr. Kushal Vuong      Patient: Mitul Hassan   : 1950   MRN: [id number]  Evaluation Date: 10/20/2017      Diagnosis Information:  · Diagnosis: cervical DDD, M50.30, cervical stenosis M48.02, Spondylosis of cervical region without myelopathy of radic M47.812   · Treatment Diagnosis: Decreased flexib, strength neck and gerardo, increased tone and tender upper back and neck mm. Physical Therapy Certification/Re-Certification Form  Dear Dr. Gracie Nguyen,  The following patient has been evaluated for physical therapy services. Please review the attached evaluation and/or summary of the patient's plan of care, and verify that you agree therapy should continue by signing the attached document and sending it back to our office. Plan of Care/Treatment to date:  [x] Therapeutic Exercise      [x] Modalities:  [x] Therapeutic Activity        [] Ultrasound    [x] Gait Training        [] Cervical Traction   [x] Neuromuscular Re-education      [] Cold/hotpack    [x] Instruction in HEP        [] Lumbar Traction  [x] Manual Therapy        [] Electrical Stimulation            [] Aquatic Therapy        [] Iontophoresis        ? [] Lymphedema management  [] Women's Health     Other:  [] Vestibular Rehab        []    []  Needed     Frequency/Duration:  # Days per week: [] 1 day # Weeks: [] 1 week [] 5 weeks     [x] 2 days? [] 2 weeks [x] 6 weeks     [] 3 days   [] 3 weeks [] 7 weeks     [] 4 days   [] 4 weeks [] 8 weeks    Rehab Potential: [] Excellent [x] Good [] Fair  [] Poor     Electronically signed by:  Caprice Keith, PT, DPT    If you have any questions or concerns, please don't hesitate to call.   Thank you for your referral.      Physician Signature:________________________________Date:__________________  By signing above, therapists plan is approved by physician

## 2017-10-30 ENCOUNTER — OFFICE VISIT (OUTPATIENT)
Dept: ORTHOPEDIC SURGERY | Age: 67
End: 2017-10-30

## 2017-10-30 VITALS — WEIGHT: 158.95 LBS | BODY MASS INDEX: 27.14 KG/M2 | HEIGHT: 64 IN

## 2017-10-30 DIAGNOSIS — M48.02 CERVICAL SPINAL STENOSIS: ICD-10-CM

## 2017-10-30 DIAGNOSIS — M50.30 DDD (DEGENERATIVE DISC DISEASE), CERVICAL: Primary | ICD-10-CM

## 2017-10-30 DIAGNOSIS — M47.22 OSTEOARTHRITIS OF SPINE WITH RADICULOPATHY, CERVICAL REGION: ICD-10-CM

## 2017-10-30 PROCEDURE — 4040F PNEUMOC VAC/ADMIN/RCVD: CPT | Performed by: PHYSICAL MEDICINE & REHABILITATION

## 2017-10-30 PROCEDURE — G8399 PT W/DXA RESULTS DOCUMENT: HCPCS | Performed by: PHYSICAL MEDICINE & REHABILITATION

## 2017-10-30 PROCEDURE — 1036F TOBACCO NON-USER: CPT | Performed by: PHYSICAL MEDICINE & REHABILITATION

## 2017-10-30 PROCEDURE — G8417 CALC BMI ABV UP PARAM F/U: HCPCS | Performed by: PHYSICAL MEDICINE & REHABILITATION

## 2017-10-30 PROCEDURE — G8427 DOCREV CUR MEDS BY ELIG CLIN: HCPCS | Performed by: PHYSICAL MEDICINE & REHABILITATION

## 2017-10-30 PROCEDURE — G8484 FLU IMMUNIZE NO ADMIN: HCPCS | Performed by: PHYSICAL MEDICINE & REHABILITATION

## 2017-10-30 PROCEDURE — 1090F PRES/ABSN URINE INCON ASSESS: CPT | Performed by: PHYSICAL MEDICINE & REHABILITATION

## 2017-10-30 PROCEDURE — 1123F ACP DISCUSS/DSCN MKR DOCD: CPT | Performed by: PHYSICAL MEDICINE & REHABILITATION

## 2017-10-30 PROCEDURE — 99213 OFFICE O/P EST LOW 20 MIN: CPT | Performed by: PHYSICAL MEDICINE & REHABILITATION

## 2017-10-30 PROCEDURE — 3014F SCREEN MAMMO DOC REV: CPT | Performed by: PHYSICAL MEDICINE & REHABILITATION

## 2017-10-30 PROCEDURE — 3017F COLORECTAL CA SCREEN DOC REV: CPT | Performed by: PHYSICAL MEDICINE & REHABILITATION

## 2017-10-30 NOTE — PROGRESS NOTES
Rfl:     methocarbamol (ROBAXIN) 500 MG tablet, Take 500 mg by mouth 4 times daily. , Disp: , Rfl:     Zolpidem Tartrate (AMBIEN PO), Take  by mouth., Disp: , Rfl:   Allergies:  Codeine and Sulfa antibiotics  Social History:    reports that she quit smoking about 12 years ago. She does not have any smokeless tobacco history on file. She reports that she drinks alcohol. She reports that she does not use drugs. Family History:   History reviewed. No pertinent family history. REVIEW OF SYSTEMS:   CONSTITUTIONAL: Denies unexplained weight loss, fevers, chills or fatigue  NEUROLOGICAL: Denies unsteady gait or progressive weakness  MUSCULOSKELETAL: Denies joint swelling or redness  GI: Denies nausea, vomiting, diarrhea   : Denies bowel or bladder issues       PHYSICAL EXAM:    Vitals: Height 5' 4.02\" (1.626 m), weight 158 lb 15.2 oz (72.1 kg), not currently breastfeeding. GENERAL EXAM:  · General Apparence: Patient is adequately groomed with no evidence of malnutrition. · Psychiatric: Orientation: The patient is oriented to time, place and person. The patient's mood and affect are appropriate   · Vascular: Examination reveals no swelling and palpation reveals no tenderness in upper or lower extremities. Good capillary refill. · The lymphatic examination of the neck, axillae and groin reveals all areas to be without enlargement or induration  · Sensation is intact without deficit in the upper and lower extremities to light touch and pinprick  · Coordination of the upper and lower extremities are normal.    CERVICAL EXAMINATION:  · Inspection: Local inspection shows no step-off or bruising. Cervical alignment is normal. No instability is noted. · Palpation and Percussion: No evidence of tenderness at the midline, and trapezius. Paraspinal tenderness is not present. There is no paraspinal spasm.   · Range of Motion:  pain-free ROM   · Strength: 5/5 bilateral upper extremities  · Special Tests:   Spurling's and

## 2017-11-01 ENCOUNTER — HOSPITAL ENCOUNTER (OUTPATIENT)
Dept: OTHER | Age: 67
Discharge: HOME | End: 2017-11-30
Attending: PHYSICAL MEDICINE & REHABILITATION | Admitting: PHYSICAL MEDICINE & REHABILITATION

## 2017-11-20 ENCOUNTER — OFFICE VISIT (OUTPATIENT)
Dept: ORTHOPEDIC SURGERY | Age: 67
End: 2017-11-20

## 2017-11-20 VITALS — BODY MASS INDEX: 26.98 KG/M2 | WEIGHT: 158 LBS | HEIGHT: 64 IN

## 2017-11-20 DIAGNOSIS — S43.432D SUPERIOR GLENOID LABRUM LESION OF LEFT SHOULDER, SUBSEQUENT ENCOUNTER: Primary | ICD-10-CM

## 2017-11-20 DIAGNOSIS — M75.122 COMPLETE TEAR OF LEFT ROTATOR CUFF: ICD-10-CM

## 2017-11-20 PROCEDURE — G8484 FLU IMMUNIZE NO ADMIN: HCPCS | Performed by: ORTHOPAEDIC SURGERY

## 2017-11-20 PROCEDURE — 1036F TOBACCO NON-USER: CPT | Performed by: ORTHOPAEDIC SURGERY

## 2017-11-20 PROCEDURE — G8399 PT W/DXA RESULTS DOCUMENT: HCPCS | Performed by: ORTHOPAEDIC SURGERY

## 2017-11-20 PROCEDURE — 20610 DRAIN/INJ JOINT/BURSA W/O US: CPT | Performed by: ORTHOPAEDIC SURGERY

## 2017-11-20 PROCEDURE — G8427 DOCREV CUR MEDS BY ELIG CLIN: HCPCS | Performed by: ORTHOPAEDIC SURGERY

## 2017-11-20 PROCEDURE — 99214 OFFICE O/P EST MOD 30 MIN: CPT | Performed by: ORTHOPAEDIC SURGERY

## 2017-11-20 PROCEDURE — 1123F ACP DISCUSS/DSCN MKR DOCD: CPT | Performed by: ORTHOPAEDIC SURGERY

## 2017-11-20 PROCEDURE — 1090F PRES/ABSN URINE INCON ASSESS: CPT | Performed by: ORTHOPAEDIC SURGERY

## 2017-11-20 PROCEDURE — 4040F PNEUMOC VAC/ADMIN/RCVD: CPT | Performed by: ORTHOPAEDIC SURGERY

## 2017-11-20 PROCEDURE — G8417 CALC BMI ABV UP PARAM F/U: HCPCS | Performed by: ORTHOPAEDIC SURGERY

## 2017-11-20 PROCEDURE — 3014F SCREEN MAMMO DOC REV: CPT | Performed by: ORTHOPAEDIC SURGERY

## 2017-11-20 PROCEDURE — 3017F COLORECTAL CA SCREEN DOC REV: CPT | Performed by: ORTHOPAEDIC SURGERY

## 2017-11-20 NOTE — PROGRESS NOTES
11/20/17 5:26 PM         NDC: 1520-2142-53    Lot Number: [id number]    Comments:
supraspinatus isolation against resistance. Shoulder shrug strength is 5 over 5 equal bilaterally. Radial ulnar and median nerve function is intact. Capillary refill is brisk. Elbow motion finger and wrist motion is full equal bilaterally. Deep tendon reflexes of the Brachial radialis, biceps, tricepsAre all +2/4 equal bilaterally. Cervical spine range of motion is full without pain negative Spurling's test.  Load-and-shift test is negative. Crank test is negative. Apprehension and relocation is negative. Anterior and posterior glide are equal bilaterally. Negative sulcus sign. No signs of any significant multidirectional instability. There is no scapular winging. There is no muscle atrophy of the latissimus dorsi, the deltoid, the periscapular musculature,The trapezius musculature or the pectoralis musculature. Negative Neer's test, negative Kuo test, no pain with crossarm elevation. Abduction --150 degrees  Flexion-- 180 degrees  Extension-- between 45-60 degrees  Latera/external  rotation --close to 90 degrees  Medial/ internal rotation -- between 70-90 degree      Reflex: upper extremity: Deep tendon reflexes of the biceps, triceps, brachioradialis +2/4 equal bilaterally  Lower extremity: +2/4 and equal bilaterally for patella and Achilles    Additional Comments:       Cervical spine exam demonstrates no  Radiculopathy no reproduction of the symptomology. Range of motion is normal without pain or radiculopathy and does not cause shoulder pain. Additional Examinations:  Right Upper Extremity:  Examination of the right upper extremity does not show any tenderness, deformity or injury. Range of motion is unremarkable. There is no gross instability. There are no rashes, ulcerations or lesions. Strength and tone are normal.  Thoracic Spine: Examination of the thoracic spine does not show any tenderness, deformity or injury. Range of motion is unremarkable. There is no gross instability.

## 2017-12-01 ENCOUNTER — HOSPITAL ENCOUNTER (OUTPATIENT)
Dept: OTHER | Age: 67
Discharge: HOME | End: 2017-12-31
Attending: PHYSICAL MEDICINE & REHABILITATION | Admitting: PHYSICAL MEDICINE & REHABILITATION

## 2017-12-01 NOTE — FLOWSHEET NOTE
Physical Therapy Daily Treatment Note  Date:  2017    Patient Name:  Jayleen Higgins    :  1950  MRN: [id number]  Restrictions/Precautions:  L RTC tear and may have surgery in future  Medical/Treatment Diagnosis Information:   · Diagnosis: cervical DDD, M50.30, cervical stenosis M48.02, Spondylosis of cervical region without myelopathy of radic M47.812  · Treatment Diagnosis: Decreased flexib, strength neck and gerardo, increased tone and tender upper back and neck mm. Tracking Information:  Physician Information Referring Practitioner: Dr. Natasha Demarco of Care Sent Date: 10/20 Signed Received: 10/23/17   Visit Count / Total Visits      Insurance Approved Visits  /  Approved Dates:     Insurance Information PT Insurance Information: Medicare $0, secondary AARP . Progress Note/G-codes   []  Yes  [x]  No Next Due: 10     Pain level: 3-4/10 L gerardo/neck    Subjective: Patient reports moderate pain in B neck/upper shoulders after decorating with repeated movement overhead looking up. Objective:   Observation: : see manual section  Test measurements:      Exercises: Caution L UE RTC tear and having surgery 3/6/18, pt would like to set up PT post op with Nat Gallagher, PT and Watsin, PT  Exercise/Equipment Resistance/Repetitions Other comments   UBE if able 2' FWD, 2' BKWD    pulleys Flexion X10  AB X10    Cervical ex/posture Supine cervical:  Chin tucks X10         Wall Cervical Stabilization :  Chin tucks (C.T.)  5\" X10  C. T.  With :  B scapular retraction X10  B GH ER 1# 2X10  B GH flexion 1# X10 (pain-free ROM)  Alternating GH flexion X10 L/R         Free Motion/Cable H    Theraband standing     Standing Lat stretch      standing with towel roll behind head Chin tuck 10x    Swiss Ball Leans B GH ext X20  B GH rows X20  B GH Horizontal AB X20              [facility] [place] [facility] would like to learn HP and use 30 days free in future      Other Therapeutic Activities:

## 2017-12-11 ENCOUNTER — OFFICE VISIT (OUTPATIENT)
Dept: ORTHOPEDIC SURGERY | Age: 67
End: 2017-12-11

## 2017-12-11 VITALS
WEIGHT: 158.07 LBS | HEART RATE: 71 BPM | SYSTOLIC BLOOD PRESSURE: 119 MMHG | BODY MASS INDEX: 26.99 KG/M2 | DIASTOLIC BLOOD PRESSURE: 76 MMHG | HEIGHT: 64 IN

## 2017-12-11 DIAGNOSIS — M50.30 DDD (DEGENERATIVE DISC DISEASE), CERVICAL: ICD-10-CM

## 2017-12-11 DIAGNOSIS — M54.12 CERVICAL RADICULOPATHY: ICD-10-CM

## 2017-12-11 DIAGNOSIS — M48.02 CERVICAL STENOSIS OF SPINE: Primary | ICD-10-CM

## 2017-12-11 DIAGNOSIS — G89.4 CHRONIC PAIN SYNDROME: ICD-10-CM

## 2017-12-11 PROCEDURE — G8417 CALC BMI ABV UP PARAM F/U: HCPCS | Performed by: PHYSICAL MEDICINE & REHABILITATION

## 2017-12-11 PROCEDURE — 3014F SCREEN MAMMO DOC REV: CPT | Performed by: PHYSICAL MEDICINE & REHABILITATION

## 2017-12-11 PROCEDURE — G8427 DOCREV CUR MEDS BY ELIG CLIN: HCPCS | Performed by: PHYSICAL MEDICINE & REHABILITATION

## 2017-12-11 PROCEDURE — 4040F PNEUMOC VAC/ADMIN/RCVD: CPT | Performed by: PHYSICAL MEDICINE & REHABILITATION

## 2017-12-11 PROCEDURE — G8484 FLU IMMUNIZE NO ADMIN: HCPCS | Performed by: PHYSICAL MEDICINE & REHABILITATION

## 2017-12-11 PROCEDURE — 1036F TOBACCO NON-USER: CPT | Performed by: PHYSICAL MEDICINE & REHABILITATION

## 2017-12-11 PROCEDURE — G8399 PT W/DXA RESULTS DOCUMENT: HCPCS | Performed by: PHYSICAL MEDICINE & REHABILITATION

## 2017-12-11 PROCEDURE — 1123F ACP DISCUSS/DSCN MKR DOCD: CPT | Performed by: PHYSICAL MEDICINE & REHABILITATION

## 2017-12-11 PROCEDURE — 3017F COLORECTAL CA SCREEN DOC REV: CPT | Performed by: PHYSICAL MEDICINE & REHABILITATION

## 2017-12-11 PROCEDURE — 99213 OFFICE O/P EST LOW 20 MIN: CPT | Performed by: PHYSICAL MEDICINE & REHABILITATION

## 2017-12-11 PROCEDURE — 1090F PRES/ABSN URINE INCON ASSESS: CPT | Performed by: PHYSICAL MEDICINE & REHABILITATION

## 2017-12-11 NOTE — PROGRESS NOTES
Follow up: SPINE      CHIEF COMPLAINT:    Chief Complaint   Patient presents with    Neck Pain     F/u CSP. Pt notes no new injuries or symptoms. Pain is intermittent.  Arm Pain     Notes radiating tingling into left hand/fingers following physical therapy. Notes this does disappear after awhile. HISTORY OF PRESENT ILLNESS:        The patient is a 78 y.o. female whom reports she returns after undergoing a trial of physical therapy. Prior that she underwent cervical epidural steroid injection October 2017. Overall she has had great and 75% relief of her neck and arm pain. She continues to work on her stretching daily and trigger point therapy with physical therapy is giving her significant relief. In the beginning she feels worse but then the next day her pain improves. She has also been informed that her primary care doctor that they will no longer prescribe pain medications for her. She is currently on hydrocodone 7.5 3 times per day.       Pain Assessment  Location of Pain: Neck  Location Modifiers: Posterior  Severity of Pain: 5  Quality of Pain: Aching, Dull, Other (Comment) (Burning)  Duration of Pain: Persistent  Frequency of Pain: Intermittent  Aggravating Factors: Exercise, Stretching, Bending, Straightening  Limiting Behavior: Yes  Relieving Factors: Rest (PT exercises)  Result of Injury: No  Work-Related Injury: No  Are there other pain locations you wish to document?: No    Associated signs and symptoms:   Neurogenic bowel or bladder symptoms:  no   Perceived weakness:  no   Difficulty walking:  no              Past Medical History:   Past Medical History:   Diagnosis Date    Asthma     as a child      Past Surgical History:     Past Surgical History:   Procedure Laterality Date    BUNIONECTOMY      EYE SURGERY      steve cateracts, detached retina    TUBAL LIGATION       Current Medications:     Current Outpatient Prescriptions:     HYDROcodone-acetaminophen (NORCO) 7.5-325 MG per the midline, and trapezius. Paraspinal tenderness is mildly present. There is no paraspinal spasm. · Range of Motion:  limited by 25% in all planes due to pain   · Strength: 5/5 bilateral upper extremities  · Special Tests:   Spurling's and Yadav's are negative bilaterally. Kuo and Impingement tests are negative bilaterally. · Skin:There are no rashes, ulcerations or lesions in right & left upper extremities. · Reflexes: Bilaterally triceps, biceps and brachioradialis are 1+. Clonus absent bilaterally at the feet. No pathological reflexes are noted. · Gait & station: normal, patient ambulates without assistance  · Additional Examinations:  · RIGHT UPPER EXTREMITY:  Inspection/examination of the right upper extremity does not show any tenderness, deformity or injury. Range of motion is unremarkable and pain-free. There is no gross instability. There are no rashes, ulcerations or lesions. Strength and tone are normal. No atrophy or abnormal movements are noted. · LEFT UPPER EXTREMITY: Inspection/examination of the left upper extremity does not show any tenderness, deformity or injury. Range of motion is unremarkable and pain-free. There is no gross instability. There are no rashes, ulcerations or lesions. Strength and tone are normal. No atrophy or abnormal movements are noted. · Additional Examinations:  · RIGHT LOWER EXTREMITY: Inspection/examination of the right lower extremity does not show any tenderness, deformity or injury. Range of motion is normal and pain-free. There is no gross instability. There are no rashes, ulcerations or lesions. Strength and tone are normal. No atrophy or abnormal movements are noted. · LEFT LOWER EXTREMITY:  Inspection/examination of the left lower extremity does not show any tenderness, deformity or injury. Range of motion is normal and pain-free. There is no gross instability. There are no rashes, ulcerations or lesions.   Strength and tone are normal. No

## 2017-12-14 ENCOUNTER — TELEPHONE (OUTPATIENT)
Dept: ORTHOPEDIC SURGERY | Age: 67
End: 2017-12-14

## 2018-01-01 ENCOUNTER — HOSPITAL ENCOUNTER (OUTPATIENT)
Dept: OTHER | Age: 68
Discharge: HOME | End: 2018-01-31
Attending: PHYSICAL MEDICINE & REHABILITATION | Admitting: PHYSICAL MEDICINE & REHABILITATION

## 2018-01-29 ENCOUNTER — OFFICE VISIT (OUTPATIENT)
Dept: ORTHOPEDIC SURGERY | Age: 68
End: 2018-01-29

## 2018-01-29 VITALS — HEIGHT: 65 IN | BODY MASS INDEX: 26.33 KG/M2 | WEIGHT: 158 LBS

## 2018-01-29 DIAGNOSIS — G89.4 CHRONIC PAIN SYNDROME: ICD-10-CM

## 2018-01-29 DIAGNOSIS — M48.02 CERVICAL SPINAL STENOSIS: ICD-10-CM

## 2018-01-29 DIAGNOSIS — M25.512 CHRONIC LEFT SHOULDER PAIN: ICD-10-CM

## 2018-01-29 DIAGNOSIS — M50.30 DDD (DEGENERATIVE DISC DISEASE), CERVICAL: Primary | ICD-10-CM

## 2018-01-29 DIAGNOSIS — G89.29 CHRONIC LEFT SHOULDER PAIN: ICD-10-CM

## 2018-01-29 PROCEDURE — 1123F ACP DISCUSS/DSCN MKR DOCD: CPT | Performed by: PHYSICAL MEDICINE & REHABILITATION

## 2018-01-29 PROCEDURE — G8427 DOCREV CUR MEDS BY ELIG CLIN: HCPCS | Performed by: PHYSICAL MEDICINE & REHABILITATION

## 2018-01-29 PROCEDURE — G8484 FLU IMMUNIZE NO ADMIN: HCPCS | Performed by: PHYSICAL MEDICINE & REHABILITATION

## 2018-01-29 PROCEDURE — G8399 PT W/DXA RESULTS DOCUMENT: HCPCS | Performed by: PHYSICAL MEDICINE & REHABILITATION

## 2018-01-29 PROCEDURE — 1036F TOBACCO NON-USER: CPT | Performed by: PHYSICAL MEDICINE & REHABILITATION

## 2018-01-29 PROCEDURE — 4040F PNEUMOC VAC/ADMIN/RCVD: CPT | Performed by: PHYSICAL MEDICINE & REHABILITATION

## 2018-01-29 PROCEDURE — 3014F SCREEN MAMMO DOC REV: CPT | Performed by: PHYSICAL MEDICINE & REHABILITATION

## 2018-01-29 PROCEDURE — 3017F COLORECTAL CA SCREEN DOC REV: CPT | Performed by: PHYSICAL MEDICINE & REHABILITATION

## 2018-01-29 PROCEDURE — 99214 OFFICE O/P EST MOD 30 MIN: CPT | Performed by: PHYSICAL MEDICINE & REHABILITATION

## 2018-01-29 PROCEDURE — G8417 CALC BMI ABV UP PARAM F/U: HCPCS | Performed by: PHYSICAL MEDICINE & REHABILITATION

## 2018-01-29 PROCEDURE — 1090F PRES/ABSN URINE INCON ASSESS: CPT | Performed by: PHYSICAL MEDICINE & REHABILITATION

## 2018-01-29 RX ORDER — MELOXICAM 15 MG/1
15 TABLET ORAL DAILY
COMMUNITY
End: 2018-03-06

## 2018-01-29 RX ORDER — HYDROCODONE BITARTRATE AND ACETAMINOPHEN 7.5; 325 MG/1; MG/1
1 TABLET ORAL 3 TIMES DAILY PRN
Qty: 90 TABLET | Refills: 0 | Status: SHIPPED | OUTPATIENT
Start: 2018-01-29 | End: 2018-02-28

## 2018-01-29 RX ORDER — GABAPENTIN 300 MG/1
300 CAPSULE ORAL NIGHTLY
COMMUNITY
End: 2018-06-22

## 2018-01-29 NOTE — LETTER
MEDICATION AGREEMENT     Geroge First  [birth year]      For certain conditions, multiple classes of medications may be used to help better manage your symptoms, and to improve your ability to function at home, work and in social settings. However, these medications do have risks, which will be discussed with you, including addiction and dependency. The following prescribed medications need frequent monitoring and will require you to partner and assist in your healthcare. Medication  Dose, instructions and quantity as indicated on current prescription bottle Diagnosis/Reason(s) for Taking Category   NORCO 7.5/325, 1 PO TID #90   CSP DDD / CSP STENOSIS, CPS                            Benefits and goals of Controlled Substance Medications: There are two potential goals for your treatment: (1) decreased pain and suffering (2) improved daily life functions. There are many possible treatments for your chronic condition(s), and, in addition to controlled substance medications, we will try alternatives such as physical therapy, yoga, massage, home daily exercise, meditation, relaxation techniques, injections, chiropractic manipulations, surgery, cognitive therapy, hypnosis and many medications that are not habit-forming. Use of controlled substance medications may be helpful, but they are unlikely to resolve all of your symptoms or restore all function. Risks of Controlled Substance Medications:    Opioid pain medications: These medications can lead to problems such as addiction/dependence, sedation, lightheadedness/dizziness, memory issues, falls, constipation, nausea, or vomiting. They may also impair the ability to drive or operate machinery. Additionally, these medications may lower testosterone levels, leading to loss of bone strength, stamina and sex drive.   They may cause problems with breathing, sleep apnea and reduced coughing, which are especially dangerous for patients with lung · I will actively participate in any program designed to improve function, including social, physical, psychological and daily or work activities. 2. I will not use illegal or street drugs or another person's prescription. If I have an addiction problem with drugs or alcohol and my provider asks me to enter a program to address this issue, I agree to follow through. Such programs may include:  · 12-Step program and securing a sponsor  · Individual counseling   · Inpatient or outpatient treatment      If in treatment, I will request that a copy of the programs initial evaluation and treatment recommendations be sent to this provider and will not expect refills until that is received. I will also request written monthly updates be sent to this provider to verify my continuing treatment. 3. I will consent to drug screening upon my providers request to assure I am only taking the prescribed drugs, described in this MEDICATION AGREEMENT. I understand that a drug screen is a laboratory test in which a sample of my urine, blood or saliva is checked to see what drugs I have been taking. 4. I agree that I will treat the providers and staff at this office with respect at all times. I will keep all of my scheduled appointments, but if I need to cancel my appointment, I will do so a minimum of 24 hours before it is scheduled. 5. I understand that this provider may stop prescribing the medications listed if:  · I do not show any improvement in pain, or my activity has not improved. · I develop rapid tolerance or loss of improvement, as described in my treatment plan. · I develop significant side effects from the medication. · My behavior is inconsistent with the responsibilities outlined above, which may also result in my being prevented from receiving further care from this office. AGREEMENT:    I have read the above and have had all of my questions answered.   For chronic disease management, I know that my symptoms can be managed with many types of treatments. A chronic medication trial may be part of my treatment, but I must be an active participant in my care. Medication therapy is only one part of my symptom management plan. In some cases, there may be limited scientific evidence to support the chronic use of certain medications to improve symptoms and daily function. Furthermore, in certain circumstances, there may be scientific information that suggests that use of chronic controlled substances may actually worsen my symptoms and increase my risk of unintentional death directly related to this medication therapy. I know that if my provider feels my risk from controlled medications is greater than my benefit, I will have my controlled substance medication(s) compassionately lowered or removed altogether. I agree to a controlled substance medication trial.      I further agree to allow this office to contact family or friends if there are concerns about my safety and use of the controlled medications. I have agreed to use the following medications above as instructed by my physician and as stated in this [place] [address].      Patient Signature:  ______________________  Date:1/29/2018    Provider Signature:______________________  Date:1/29/2018

## 2018-01-29 NOTE — PROGRESS NOTES
Percussion: No evidence of tenderness at the midline, and trapezius. Paraspinal tenderness is not present. There is no paraspinal spasm. · Range of Motion:  limited by 25% in all planes due to pain   · Strength: 5/5 bilateral upper extremities  · Special Tests:   Spurling's and Yadav's are negative bilaterally. Kuo and Impingement tests are negative bilaterally. · Skin:There are no rashes, ulcerations or lesions in right & left upper extremities. · Reflexes: Bilaterally triceps, biceps and brachioradialis are 1+. Clonus absent bilaterally at the feet. No pathological reflexes are noted. · Gait & station: normal, patient ambulates without assistance  · Additional Examinations:  · RIGHT UPPER EXTREMITY:  Inspection/examination of the right upper extremity does not show any tenderness, deformity or injury. Range of motion is unremarkable and pain-free. There is no gross instability. There are no rashes, ulcerations or lesions. Strength and tone are normal. No atrophy or abnormal movements are noted. · LEFT UPPER EXTREMITY: Inspection/examination of the left upper extremity does not show any tenderness, deformity or injury. Range of motion is unremarkable and pain-free. There is no gross instability. There are no rashes, ulcerations or lesions. Strength and tone are normal. No atrophy or abnormal movements are noted. · RIGHT LOWER EXTREMITY: Inspection/examination of the right lower extremity does not show any tenderness, deformity or injury. Range of motion is unremarkable. There is no gross instability. There are no rashes, ulcerations or lesions. Strength and tone are normal. No atrophy or abnormal movements are noted. · LEFT LOWER EXTREMITY:  Inspection/examination of the left lower extremity does not show any tenderness, deformity or injury. Range of motion is unremarkable. There is no gross instability. There are no rashes, ulcerations or lesions.   Strength and tone are normal. No atrophy or 5. 6 1.7 - 7.7 K/uL    Lymphocytes # 2.5 1.0 - 5.1 K/uL    Monocytes # 0.8 0.0 - 1.3 K/uL    Eosinophils # 0.2 0.0 - 0.6 K/uL    Basophils # 0.0 0.0 - 0.2 K/uL   Comprehensive Metabolic Panel   Result Value Ref Range    Sodium 138 136 - 145 mmol/L    Potassium 3.6 3.5 - 5.1 mmol/L    Chloride 100 99 - 110 mmol/L    CO2 27 21 - 32 mmol/L    Glucose 118 (H) 70 - 99 mg/dL    BUN 12 7 - 20 mg/dL    CREATININE 0.7 0.6 - 1.2 mg/dL    GFR Non-African American >60 >60    GFR African American >60 >60    Calcium 9.4 8.3 - 10.6 mg/dL    Total Protein 7.1 6.4 - 8.2 g/dL    Alb 4.4 3.4 - 5.0 g/dL    Albumin/Globulin Ratio 1.6 1.1 - 2.2    Total Bilirubin <0.2 0.0 - 1.0 mg/dL    Alkaline Phosphatase 40 40 - 129 U/L    ALT 13 10 - 40 U/L    AST 15 15 - 37 U/L    Globulin 2.7 g/dL   Troponin   Result Value Ref Range    Troponin <0.01 <0.01 ng/mL   EKG 12 Lead   Result Value Ref Range    Ventricular Rate 72 BPM    Atrial Rate 72 BPM    P-R Interval 144 ms    QRS Duration 76 ms    Q-T Interval 404 ms    QTc Calculation (Bazett) 442 ms    P Axis 57 degrees    R Axis 32 degrees    T Axis 31 degrees    Diagnosis       Normal sinus rhythmNormal ECGConfirmed by Tonny Laboy MD, JEFF ([id number]) on 9/7/2014 11:52:15 AM     Impression:       1. DDD (degenerative disc disease), cervical    2. Cervical spinal stenosis    3. Chronic left shoulder pain    4. Chronic pain syndrome        Plan:  Clinical Course: Worsening  1. Medications:  . OARRS reviewed and appropriate. Low risk on ORT. Continue current regimen of hydrocodone 7.53 times per day. Jacqui Cantu Opioid agreement was signed today. Last oral swab testing appropriate   Informed verbal consent was obtained. Goals of the current treatment regimen include: improvement in pain, improvement in overall in physical performance, ability to perform daily activities, work or disability, emotional distress, health care utilization and decreased medication consumption.      Progress will be monitored towards

## 2018-02-01 ENCOUNTER — HOSPITAL ENCOUNTER (OUTPATIENT)
Dept: OTHER | Age: 68
Discharge: HOME | End: 2018-02-28
Attending: PHYSICAL MEDICINE & REHABILITATION | Admitting: PHYSICAL MEDICINE & REHABILITATION

## 2018-02-15 ENCOUNTER — TELEPHONE (OUTPATIENT)
Dept: ORTHOPEDIC SURGERY | Age: 68
End: 2018-02-15

## 2018-02-15 DIAGNOSIS — M75.122 COMPLETE TEAR OF LEFT ROTATOR CUFF: ICD-10-CM

## 2018-02-15 DIAGNOSIS — S43.432D SUPERIOR GLENOID LABRUM LESION OF LEFT SHOULDER, SUBSEQUENT ENCOUNTER: Primary | ICD-10-CM

## 2018-02-15 PROCEDURE — L3670 SO ACRO/CLAV CAN WEB PRE OTS: HCPCS | Performed by: ORTHOPAEDIC SURGERY

## 2018-03-01 ENCOUNTER — HOSPITAL ENCOUNTER (OUTPATIENT)
Dept: OTHER | Age: 68
Discharge: HOME | End: 2018-03-31
Attending: PHYSICAL MEDICINE & REHABILITATION | Admitting: PHYSICAL MEDICINE & REHABILITATION

## 2018-03-01 DIAGNOSIS — S43.432D SUPERIOR GLENOID LABRUM LESION OF LEFT SHOULDER, SUBSEQUENT ENCOUNTER: Primary | ICD-10-CM

## 2018-03-01 DIAGNOSIS — M75.122 COMPLETE TEAR OF LEFT ROTATOR CUFF: ICD-10-CM

## 2018-03-01 RX ORDER — MELOXICAM 15 MG/1
15 TABLET ORAL DAILY
Qty: 30 TABLET | Refills: 3 | Status: SHIPPED | OUTPATIENT
Start: 2018-03-06 | End: 2018-06-22

## 2018-03-01 RX ORDER — OXYCODONE HYDROCHLORIDE 10 MG/1
10 TABLET ORAL EVERY 8 HOURS PRN
Qty: 21 TABLET | Refills: 0 | Status: SHIPPED | OUTPATIENT
Start: 2018-03-06 | End: 2018-03-13

## 2018-03-05 ENCOUNTER — OFFICE VISIT (OUTPATIENT)
Dept: ORTHOPEDIC SURGERY | Age: 68
End: 2018-03-05

## 2018-03-05 VITALS
HEIGHT: 65 IN | HEART RATE: 63 BPM | SYSTOLIC BLOOD PRESSURE: 142 MMHG | BODY MASS INDEX: 26.34 KG/M2 | DIASTOLIC BLOOD PRESSURE: 82 MMHG | WEIGHT: 158.07 LBS

## 2018-03-05 DIAGNOSIS — M70.61 GREATER TROCHANTERIC BURSITIS OF BOTH HIPS: ICD-10-CM

## 2018-03-05 DIAGNOSIS — G89.29 CHRONIC LEFT SHOULDER PAIN: ICD-10-CM

## 2018-03-05 DIAGNOSIS — M70.62 GREATER TROCHANTERIC BURSITIS OF BOTH HIPS: ICD-10-CM

## 2018-03-05 DIAGNOSIS — M50.30 DEGENERATIVE CERVICAL DISC: Primary | ICD-10-CM

## 2018-03-05 DIAGNOSIS — M48.02 CERVICAL SPINAL STENOSIS: ICD-10-CM

## 2018-03-05 DIAGNOSIS — G89.4 CHRONIC PAIN SYNDROME: ICD-10-CM

## 2018-03-05 DIAGNOSIS — M25.512 CHRONIC LEFT SHOULDER PAIN: ICD-10-CM

## 2018-03-05 PROCEDURE — G8399 PT W/DXA RESULTS DOCUMENT: HCPCS | Performed by: PHYSICAL MEDICINE & REHABILITATION

## 2018-03-05 PROCEDURE — G8417 CALC BMI ABV UP PARAM F/U: HCPCS | Performed by: PHYSICAL MEDICINE & REHABILITATION

## 2018-03-05 PROCEDURE — 3017F COLORECTAL CA SCREEN DOC REV: CPT | Performed by: PHYSICAL MEDICINE & REHABILITATION

## 2018-03-05 PROCEDURE — 1123F ACP DISCUSS/DSCN MKR DOCD: CPT | Performed by: PHYSICAL MEDICINE & REHABILITATION

## 2018-03-05 PROCEDURE — 99213 OFFICE O/P EST LOW 20 MIN: CPT | Performed by: PHYSICAL MEDICINE & REHABILITATION

## 2018-03-05 PROCEDURE — G8484 FLU IMMUNIZE NO ADMIN: HCPCS | Performed by: PHYSICAL MEDICINE & REHABILITATION

## 2018-03-05 PROCEDURE — 1036F TOBACCO NON-USER: CPT | Performed by: PHYSICAL MEDICINE & REHABILITATION

## 2018-03-05 PROCEDURE — 1090F PRES/ABSN URINE INCON ASSESS: CPT | Performed by: PHYSICAL MEDICINE & REHABILITATION

## 2018-03-05 PROCEDURE — 3014F SCREEN MAMMO DOC REV: CPT | Performed by: PHYSICAL MEDICINE & REHABILITATION

## 2018-03-05 PROCEDURE — 4040F PNEUMOC VAC/ADMIN/RCVD: CPT | Performed by: PHYSICAL MEDICINE & REHABILITATION

## 2018-03-05 PROCEDURE — G8427 DOCREV CUR MEDS BY ELIG CLIN: HCPCS | Performed by: PHYSICAL MEDICINE & REHABILITATION

## 2018-03-05 RX ORDER — HYDROCODONE BITARTRATE AND ACETAMINOPHEN 7.5; 325 MG/1; MG/1
1 TABLET ORAL EVERY 8 HOURS PRN
Qty: 90 TABLET | Refills: 0 | Status: SHIPPED | OUTPATIENT
Start: 2018-03-05 | End: 2018-04-05

## 2018-03-05 NOTE — PROGRESS NOTES
in the last 6 months: no  · Do other people say you worry too much? no   · Any significant change in your life that concerns you? no         Pain Assessment  Location of Pain: Neck  Location Modifiers: Posterior  Severity of Pain: 2  Quality of Pain: Aching  Duration of Pain: Persistent  Frequency of Pain: Constant  Aggravating Factors: Other (Comment) (N/A)  Limiting Behavior: Yes  Relieving Factors: Rest, Other (Comment) (Norco)  Result of Injury: No  Work-Related Injury: No  Are there other pain locations you wish to document?: No    Associated signs and symptoms:   Neurogenic bowel or bladder symptoms:  no   Perceived weakness:  no   Difficulty walking:  no              Past Medical History:   Past Medical History:   Diagnosis Date    Asthma     as a child      Past Surgical History:     Past Surgical History:   Procedure Laterality Date    BUNIONECTOMY      EYE SURGERY      steve cateracts, detached retina    TUBAL LIGATION       Current Medications:     Current Outpatient Prescriptions:     HYDROcodone-acetaminophen (NORCO) 7.5-325 MG per tablet, Take 1 tablet by mouth every 8 hours as needed for Pain for up to 90 doses. Earliest Fill Date: 3/5/18, Disp: 90 tablet, Rfl: 0    [START ON 3/6/2018] meloxicam (MOBIC) 15 MG tablet, Take 1 tablet by mouth daily, Disp: 30 tablet, Rfl: 3    [START ON 3/6/2018] oxyCODONE HCl (OXY-IR) 10 MG immediate release tablet, Take 1 tablet by mouth every 8 hours as needed for Pain for up to 7 days.  Earliest Fill Date: 3/6/18, Disp: 21 tablet, Rfl: 0    gabapentin (NEURONTIN) 300 MG capsule, Take 300 mg by mouth nightly., Disp: , Rfl:     meloxicam (MOBIC) 15 MG tablet, Take 15 mg by mouth daily, Disp: , Rfl:     HYDROcodone-acetaminophen (NORCO) 7.5-325 MG per tablet, ., Disp: , Rfl:     ibuprofen (ADVIL;MOTRIN) 400 MG tablet, , Disp: , Rfl:     methocarbamol (ROBAXIN) 750 MG tablet, , Disp: , Rfl:     zolpidem (AMBIEN) 10 MG tablet, , Disp: , Rfl:     Zolpidem Tartrate (AMBIEN PO), Take  by mouth., Disp: , Rfl:   Allergies:  Sulfa antibiotics and Codeine  Social History:    reports that she quit smoking about 13 years ago. She has a 20.00 pack-year smoking history. She has never used smokeless tobacco. She reports that she drinks alcohol. She reports that she does not use drugs. Family History:   History reviewed. No pertinent family history. REVIEW OF SYSTEMS:   CONSTITUTIONAL: Denies unexplained weight loss, fevers, chills or fatigue  NEUROLOGICAL: Denies unsteady gait or progressive weakness  MUSCULOSKELETAL: Denies joint swelling or redness  GI: Denies nausea, vomiting, diarrhea   : Denies bowel or bladder issues       PHYSICAL EXAM:    Vitals: Blood pressure (!) 142/82, pulse 63, height 5' 5\" (1.651 m), weight 158 lb 1.1 oz (71.7 kg), not currently breastfeeding. GENERAL EXAM:  · General Apparence: Patient is adequately groomed with no evidence of malnutrition. · Psychiatric: Orientation: The patient is oriented to time, place and person. The patient's mood and affect are appropriate   · Vascular: Examination reveals no swelling and palpation reveals no tenderness in upper or lower extremities. Good capillary refill. · The lymphatic examination of the neck, axillae and groin reveals all areas to be without enlargement or induration  · Sensation is intact without deficit in the upper and lower extremities to light touch and pinprick  · Coordination of the upper and lower extremities are normal.    CERVICAL EXAMINATION:  · Inspection: Local inspection shows no step-off or bruising. Cervical alignment is normal. No instability is noted. · Palpation and Percussion: No evidence of tenderness at the midline, and trapezius. Paraspinal tenderness is not present. There is no paraspinal spasm.   · Range of Motion:  limited by 25% in all planes due to pain   · Strength: 5/5 bilateral upper extremities  · Special Tests:   Spurling's + on left and Yadav's are negative bilaterally. · Skin:There are no rashes, ulcerations or lesions in right & left upper extremities. · Reflexes: Bilaterally triceps, biceps and brachioradialis are 2+. Clonus absent bilaterally at the feet. No pathological reflexes are noted. · Gait & station: normal, patient ambulates without assistance  · Additional Examinations:  · RIGHT UPPER EXTREMITY:  Inspection/examination of the right upper extremity does not show any tenderness, deformity or injury. Range of motion is unremarkable and pain-free. There is no gross instability. There are no rashes, ulcerations or lesions. Strength and tone are normal. No atrophy or abnormal movements are noted. · LEFT UPPER EXTREMITY: Inspection/examination of the left upper extremity does not show any tenderness, deformity or injury. Range of motion is unremarkable and pain-free. There is no gross instability. There are no rashes, ulcerations or lesions. Strength and tone are normal. No atrophy or abnormal movements are noted. LUMBAR/SACRAL EXAMINATION:  · Inspection: Local inspection shows no step-off or bruising. Lumbar alignment is normal. No instability is noted. · Palpation:   No evidence of tenderness at the midline. No tenderness bilaterally at the paraspinal. Possible tenderness of the bilateral greater trochanter bursas. There is no paraspinal spasm. · Range of Motion: limited by 25% in all planes due to pain  · Strength:   Strength testing is 5/5 in all muscle groups tested. · Special Tests:   Straight leg raise and crossed SLR negative. Tu's testing is negative bilaterally. FADIR's testing is negative bilaterally. · Skin: There are no rashes, ulcerations or lesions. · Reflexes: Reflexes are symmetrically 2+ at the patellar and ankle tendons. Clonus absent bilaterally at the feet.   · Gait & station: normal, patient ambulates without assistance  · Additional Examinations:  · RIGHT LOWER EXTREMITY: Inspection/examination of the right

## 2018-03-06 ENCOUNTER — HOSPITAL ENCOUNTER (OUTPATIENT)
Dept: SURGERY | Age: 68
Discharge: HOME | End: 2018-03-06
Attending: ORTHOPAEDIC SURGERY | Admitting: ORTHOPAEDIC SURGERY

## 2018-03-06 VITALS
SYSTOLIC BLOOD PRESSURE: 127 MMHG | RESPIRATION RATE: 16 BRPM | OXYGEN SATURATION: 99 % | WEIGHT: 168 LBS | DIASTOLIC BLOOD PRESSURE: 68 MMHG | HEIGHT: 65 IN | HEART RATE: 64 BPM | BODY MASS INDEX: 27.99 KG/M2 | TEMPERATURE: 98.5 F

## 2018-03-06 PROCEDURE — 29823 SHO ARTHRS SRG XTNSV DBRDMT: CPT | Performed by: ORTHOPAEDIC SURGERY

## 2018-03-06 PROCEDURE — 29824 SHO ARTHRS SRG DSTL CLAVICLC: CPT | Performed by: ORTHOPAEDIC SURGERY

## 2018-03-06 PROCEDURE — 29827 SHO ARTHRS SRG RT8TR CUF RPR: CPT | Performed by: ORTHOPAEDIC SURGERY

## 2018-03-06 PROCEDURE — 29826 SHO ARTHRS SRG DECOMPRESSION: CPT | Performed by: ORTHOPAEDIC SURGERY

## 2018-03-06 RX ORDER — HYDROMORPHONE HCL 110MG/55ML
0.5 PATIENT CONTROLLED ANALGESIA SYRINGE INTRAVENOUS EVERY 5 MIN PRN
Status: DISCONTINUED | OUTPATIENT
Start: 2018-03-06 | End: 2018-03-07 | Stop reason: HOSPADM

## 2018-03-06 RX ORDER — SODIUM CHLORIDE, SODIUM LACTATE, POTASSIUM CHLORIDE, CALCIUM CHLORIDE 600; 310; 30; 20 MG/100ML; MG/100ML; MG/100ML; MG/100ML
INJECTION, SOLUTION INTRAVENOUS CONTINUOUS
Status: DISCONTINUED | OUTPATIENT
Start: 2018-03-06 | End: 2018-03-07 | Stop reason: HOSPADM

## 2018-03-06 RX ORDER — FENTANYL CITRATE 50 UG/ML
50 INJECTION, SOLUTION INTRAMUSCULAR; INTRAVENOUS EVERY 5 MIN PRN
Status: DISCONTINUED | OUTPATIENT
Start: 2018-03-06 | End: 2018-03-07 | Stop reason: HOSPADM

## 2018-03-06 RX ORDER — SODIUM CHLORIDE 0.9 % (FLUSH) 0.9 %
10 SYRINGE (ML) INJECTION PRN
Status: DISCONTINUED | OUTPATIENT
Start: 2018-03-06 | End: 2018-03-07 | Stop reason: HOSPADM

## 2018-03-06 RX ORDER — HYDROMORPHONE HCL 110MG/55ML
0.25 PATIENT CONTROLLED ANALGESIA SYRINGE INTRAVENOUS EVERY 5 MIN PRN
Status: DISCONTINUED | OUTPATIENT
Start: 2018-03-06 | End: 2018-03-07 | Stop reason: HOSPADM

## 2018-03-06 RX ORDER — LABETALOL HYDROCHLORIDE 5 MG/ML
5 INJECTION, SOLUTION INTRAVENOUS EVERY 10 MIN PRN
Status: DISCONTINUED | OUTPATIENT
Start: 2018-03-06 | End: 2018-03-07 | Stop reason: HOSPADM

## 2018-03-06 RX ORDER — CEFAZOLIN SODIUM 2 G/100ML
2 INJECTION, SOLUTION INTRAVENOUS
Status: COMPLETED | OUTPATIENT
Start: 2018-03-06 | End: 2018-03-06

## 2018-03-06 RX ORDER — ACETAMINOPHEN 325 MG/1
650 TABLET ORAL EVERY 4 HOURS PRN
Status: DISCONTINUED | OUTPATIENT
Start: 2018-03-06 | End: 2018-03-07 | Stop reason: HOSPADM

## 2018-03-06 RX ORDER — SODIUM CHLORIDE 0.9 % (FLUSH) 0.9 %
10 SYRINGE (ML) INJECTION EVERY 12 HOURS SCHEDULED
Status: DISCONTINUED | OUTPATIENT
Start: 2018-03-06 | End: 2018-03-07 | Stop reason: HOSPADM

## 2018-03-06 RX ORDER — SODIUM CHLORIDE 9 MG/ML
INJECTION, SOLUTION INTRAVENOUS CONTINUOUS
Status: DISCONTINUED | OUTPATIENT
Start: 2018-03-06 | End: 2018-03-07 | Stop reason: HOSPADM

## 2018-03-06 RX ORDER — ONDANSETRON 2 MG/ML
4 INJECTION INTRAMUSCULAR; INTRAVENOUS
Status: ACTIVE | OUTPATIENT
Start: 2018-03-06 | End: 2018-03-06

## 2018-03-06 RX ORDER — FENTANYL CITRATE 50 UG/ML
25 INJECTION, SOLUTION INTRAMUSCULAR; INTRAVENOUS EVERY 5 MIN PRN
Status: DISCONTINUED | OUTPATIENT
Start: 2018-03-06 | End: 2018-03-07 | Stop reason: HOSPADM

## 2018-03-06 RX ADMIN — CEFAZOLIN SODIUM 2 G: 2 INJECTION, SOLUTION INTRAVENOUS at 06:56

## 2018-03-06 RX ADMIN — SODIUM CHLORIDE: 9 INJECTION, SOLUTION INTRAVENOUS at 06:43

## 2018-03-06 RX ADMIN — FENTANYL CITRATE 25 MCG: 50 INJECTION, SOLUTION INTRAMUSCULAR; INTRAVENOUS at 09:31

## 2018-03-06 RX ADMIN — FENTANYL CITRATE 50 MCG: 50 INJECTION, SOLUTION INTRAMUSCULAR; INTRAVENOUS at 09:14

## 2018-03-06 ASSESSMENT — PAIN - FUNCTIONAL ASSESSMENT: PAIN_FUNCTIONAL_ASSESSMENT: 0-10

## 2018-03-06 ASSESSMENT — PAIN DESCRIPTION - PAIN TYPE
TYPE: SURGICAL PAIN

## 2018-03-06 ASSESSMENT — PAIN SCALES - GENERAL
PAINLEVEL_OUTOF10: 3
PAINLEVEL_OUTOF10: 6
PAINLEVEL_OUTOF10: 9

## 2018-03-06 NOTE — PROGRESS NOTES
Discharge instructions reviewed with patient/responsible adult. All home medications have been reviewed, questions answered and patient and spouse verbalized understanding. Discharge instructions signed and copies given. Patient discharged   Per w/c  with belongings.

## 2018-03-06 NOTE — ANESTHESIA PRE-OP
Department of Anesthesiology  Preprocedure Note       Name:  Noam Shane   Age:  78 y.o.  :  1950                                          MRN:  [id number]         Date:  3/6/2018      Surgeon:    Procedure:    Medications prior to admission:   Prior to Admission medications    Medication Sig Start Date End Date Taking? Authorizing Provider   HYDROcodone-acetaminophen (NORCO) 7.5-325 MG per tablet Take 1 tablet by mouth every 8 hours as needed for Pain for up to 90 doses. Earliest Fill Date: 3/5/18 3/5/18 4/5/18 Yes Ondina Sexton MD   gabapentin (NEURONTIN) 300 MG capsule Take 300 mg by mouth nightly. Yes Historical Provider, MD   methocarbamol (ROBAXIN) 750 MG tablet Take by mouth 3 times daily as needed  17  Yes Historical Provider, MD   Zolpidem Tartrate (AMBIEN PO) Take 10 mg by mouth daily as needed    Yes Historical Provider, MD   meloxicam (MOBIC) 15 MG tablet Take 1 tablet by mouth daily 3/6/18   Puma Edwards DO   oxyCODONE HCl (OXY-IR) 10 MG immediate release tablet Take 1 tablet by mouth every 8 hours as needed for Pain for up to 7 days. Earliest Fill Date: 3/6/18 3/6/18 3/13/18  Puma Edwards DO       Current medications:    Current Outpatient Prescriptions   Medication Sig Dispense Refill    HYDROcodone-acetaminophen (NORCO) 7.5-325 MG per tablet Take 1 tablet by mouth every 8 hours as needed for Pain for up to 90 doses. Earliest Fill Date: 3/5/18 90 tablet 0    gabapentin (NEURONTIN) 300 MG capsule Take 300 mg by mouth nightly.  methocarbamol (ROBAXIN) 750 MG tablet Take by mouth 3 times daily as needed       Zolpidem Tartrate (AMBIEN PO) Take 10 mg by mouth daily as needed       meloxicam (MOBIC) 15 MG tablet Take 1 tablet by mouth daily 30 tablet 3    oxyCODONE HCl (OXY-IR) 10 MG immediate release tablet Take 1 tablet by mouth every 8 hours as needed for Pain for up to 7 days.  Earliest Fill Date: 3/6/18 21 tablet 0     Current Facility-Administered Medications Medication Dose Route Frequency Provider Last Rate Last Dose    ceFAZolin (ANCEF) 2 g in dextrose 4 % 100 mL IVPB (premix)  2 g Intravenous On Call to DO Siria           Allergies: Allergies   Allergen Reactions    Tramadol     Sulfa Antibiotics Hives    Codeine Nausea Only       Problem List:    Patient Active Problem List   Diagnosis Code    Superior glenoid labrum lesion of left shoulder S43.432A    Complete tear of left rotator cuff M75.122       Past Medical History:        Diagnosis Date    Asthma     as a child       Past Surgical History:        Procedure Laterality Date    BUNIONECTOMY      EYE SURGERY      steve cateracts, detached retina    TUBAL LIGATION         Social History:    Social History   Substance Use Topics    Smoking status: Former Smoker     Packs/day: 1.00     Years: 20.00     Quit date: 1/1/2005    Smokeless tobacco: Never Used    Alcohol use Yes      Comment: 2 per week                                Counseling given: Not Answered      Vital Signs (Current):   Vitals:    03/06/18 0621   BP: (!) 140/79   Pulse: 56   Resp: 16   Temp: 98 °F (36.7 °C)   SpO2: 97%   Weight: 168 lb (76.2 kg)   Height: 5' 4.5\" (1.638 m)                                              BP Readings from Last 3 Encounters:   03/06/18 (!) 140/79   03/05/18 (!) 142/82   12/11/17 119/76       NPO Status: Time of last liquid consumption: 0000                        Time of last solid consumption: 0000                        Date of last liquid consumption: 03/06/18                        Date of last solid food consumption: 03/06/18    BMI:   Wt Readings from Last 3 Encounters:   03/06/18 168 lb (76.2 kg)   03/05/18 158 lb 1.1 oz (71.7 kg)   02/28/18 158 lb (71.7 kg)     Body mass index is 28.39 kg/m².     Anesthesia Evaluation  Patient summary reviewed and Nursing notes reviewed no history of anesthetic complications:   Airway: Mallampati: II        Dental:    (+) caps      Pulmonary:Negative Pulmonary ROS and normal exam                               Cardiovascular:  Exercise tolerance: good (>4 METS),   (+) dysrhythmias:,       ECG reviewed  Rhythm: regular    Echocardiogram reviewed  Stress test reviewed             ROS comment: EF 55%     Neuro/Psych:   (+) depression/anxiety             GI/Hepatic/Renal: Neg GI/Hepatic/Renal ROS            Endo/Other:    (+) : arthritis:., .                 Abdominal:           Vascular: negative vascular ROS. Anesthesia Plan      general     ASA 2       Induction: intravenous. Anesthetic plan and risks discussed with patient. Plan discussed with CRNA.                   Samia Brock MD   3/6/2018

## 2018-03-12 ENCOUNTER — OFFICE VISIT (OUTPATIENT)
Dept: ORTHOPEDIC SURGERY | Age: 68
End: 2018-03-12

## 2018-03-12 DIAGNOSIS — M25.512 ACUTE PAIN OF LEFT SHOULDER: Primary | ICD-10-CM

## 2018-03-12 PROCEDURE — 99024 POSTOP FOLLOW-UP VISIT: CPT | Performed by: ORTHOPAEDIC SURGERY

## 2018-03-12 NOTE — PROGRESS NOTES
History of Present of Illness:  S/P Rotator Cuff Repair  The patient returns today for right shoulder evaluation 1 week after rotator cuff repair    Examination:  Inspection reveals warm, dry, intact skin. There is no adenopathy. The distal neurovascular exam is grossly intact. Examination of the contralateral shoulder reveals no atrophy or deformity. The skin is warm and dry. Range of motion is within normal limits. There is no focal tenderness with palpation. Provocative SLAP, biceps tension, apprehension AC joint or rotator cuff tests are negative. Strength is graded 5/5 in all muscle groups. The distal neurovascular exam is grossly intact. Cervical spine: The skin is warm and dry. There is no swelling, warmth, or erythema. Range of motion is within normal limits. There is no paraspinal or spinous process tenderness. Spurling's sign is negative and did not produce shoulder pain. The distal neurovascular exam is grossly intact. Diagnostic Test Findings:    No orders of the defined types were placed in this encounter.      Treatment Plan:  Follow-up in 5 weeks for recheck evaluation start physical therapy

## 2018-03-13 ENCOUNTER — HOSPITAL ENCOUNTER (OUTPATIENT)
Dept: PHYSICAL THERAPY | Age: 68
Discharge: HOME | End: 2018-03-31
Admitting: ORTHOPAEDIC SURGERY

## 2018-03-13 ASSESSMENT — PAIN DESCRIPTION - PAIN TYPE: TYPE: SURGICAL PAIN

## 2018-03-13 ASSESSMENT — PAIN DESCRIPTION - DESCRIPTORS: DESCRIPTORS: ACHING;BURNING;SHARP

## 2018-03-13 ASSESSMENT — PAIN DESCRIPTION - ONSET: ONSET: SUDDEN

## 2018-03-13 ASSESSMENT — PAIN SCALES - GENERAL: PAINLEVEL_OUTOF10: 6

## 2018-03-13 ASSESSMENT — PAIN DESCRIPTION - FREQUENCY: FREQUENCY: INTERMITTENT

## 2018-03-13 ASSESSMENT — PAIN DESCRIPTION - ORIENTATION: ORIENTATION: LEFT

## 2018-03-13 ASSESSMENT — PAIN DESCRIPTION - PROGRESSION: CLINICAL_PROGRESSION: NOT CHANGED

## 2018-03-13 ASSESSMENT — PAIN DESCRIPTION - LOCATION: LOCATION: SHOULDER

## 2018-04-01 ENCOUNTER — HOSPITAL ENCOUNTER (OUTPATIENT)
Dept: OTHER | Age: 68
Discharge: HOME | End: 2018-04-30
Attending: ORTHOPAEDIC SURGERY | Admitting: ORTHOPAEDIC SURGERY

## 2018-04-16 ENCOUNTER — OFFICE VISIT (OUTPATIENT)
Dept: ORTHOPEDIC SURGERY | Age: 68
End: 2018-04-16

## 2018-04-16 VITALS
BODY MASS INDEX: 28.68 KG/M2 | WEIGHT: 167.99 LBS | HEART RATE: 74 BPM | HEIGHT: 64 IN | SYSTOLIC BLOOD PRESSURE: 131 MMHG | DIASTOLIC BLOOD PRESSURE: 76 MMHG

## 2018-04-16 VITALS — BODY MASS INDEX: 28.68 KG/M2 | WEIGHT: 167.99 LBS | HEIGHT: 64 IN

## 2018-04-16 DIAGNOSIS — G89.29 CHRONIC LEFT SHOULDER PAIN: ICD-10-CM

## 2018-04-16 DIAGNOSIS — M48.02 CERVICAL STENOSIS OF SPINE: ICD-10-CM

## 2018-04-16 DIAGNOSIS — G89.4 CHRONIC PAIN SYNDROME: ICD-10-CM

## 2018-04-16 DIAGNOSIS — M70.62 GREATER TROCHANTERIC BURSITIS OF BOTH HIPS: ICD-10-CM

## 2018-04-16 DIAGNOSIS — M75.122 COMPLETE TEAR OF LEFT ROTATOR CUFF: Primary | ICD-10-CM

## 2018-04-16 DIAGNOSIS — M70.61 GREATER TROCHANTERIC BURSITIS OF BOTH HIPS: ICD-10-CM

## 2018-04-16 DIAGNOSIS — M50.30 DDD (DEGENERATIVE DISC DISEASE), CERVICAL: Primary | ICD-10-CM

## 2018-04-16 DIAGNOSIS — M25.512 CHRONIC LEFT SHOULDER PAIN: ICD-10-CM

## 2018-04-16 PROCEDURE — 99213 OFFICE O/P EST LOW 20 MIN: CPT | Performed by: PHYSICAL MEDICINE & REHABILITATION

## 2018-04-16 PROCEDURE — 4040F PNEUMOC VAC/ADMIN/RCVD: CPT | Performed by: PHYSICAL MEDICINE & REHABILITATION

## 2018-04-16 PROCEDURE — 99024 POSTOP FOLLOW-UP VISIT: CPT | Performed by: ORTHOPAEDIC SURGERY

## 2018-04-16 PROCEDURE — G8417 CALC BMI ABV UP PARAM F/U: HCPCS | Performed by: PHYSICAL MEDICINE & REHABILITATION

## 2018-04-16 PROCEDURE — 1090F PRES/ABSN URINE INCON ASSESS: CPT | Performed by: PHYSICAL MEDICINE & REHABILITATION

## 2018-04-16 PROCEDURE — 1123F ACP DISCUSS/DSCN MKR DOCD: CPT | Performed by: PHYSICAL MEDICINE & REHABILITATION

## 2018-04-16 PROCEDURE — G8399 PT W/DXA RESULTS DOCUMENT: HCPCS | Performed by: PHYSICAL MEDICINE & REHABILITATION

## 2018-04-16 PROCEDURE — 3017F COLORECTAL CA SCREEN DOC REV: CPT | Performed by: PHYSICAL MEDICINE & REHABILITATION

## 2018-04-16 PROCEDURE — G8427 DOCREV CUR MEDS BY ELIG CLIN: HCPCS | Performed by: PHYSICAL MEDICINE & REHABILITATION

## 2018-04-16 PROCEDURE — 3014F SCREEN MAMMO DOC REV: CPT | Performed by: PHYSICAL MEDICINE & REHABILITATION

## 2018-04-16 PROCEDURE — 1036F TOBACCO NON-USER: CPT | Performed by: PHYSICAL MEDICINE & REHABILITATION

## 2018-04-16 RX ORDER — HYDROCODONE BITARTRATE AND ACETAMINOPHEN 7.5; 325 MG/1; MG/1
1 TABLET ORAL 3 TIMES DAILY PRN
Qty: 90 TABLET | Refills: 0 | Status: SHIPPED | OUTPATIENT
Start: 2018-04-17 | End: 2018-05-17

## 2018-05-01 ENCOUNTER — HOSPITAL ENCOUNTER (OUTPATIENT)
Dept: OTHER | Age: 68
Discharge: HOME | End: 2018-05-31
Attending: ORTHOPAEDIC SURGERY | Admitting: ORTHOPAEDIC SURGERY

## 2018-05-14 ENCOUNTER — OFFICE VISIT (OUTPATIENT)
Dept: ORTHOPEDIC SURGERY | Age: 68
End: 2018-05-14

## 2018-05-14 VITALS
WEIGHT: 158 LBS | SYSTOLIC BLOOD PRESSURE: 127 MMHG | BODY MASS INDEX: 26.33 KG/M2 | HEIGHT: 65 IN | DIASTOLIC BLOOD PRESSURE: 74 MMHG

## 2018-05-14 DIAGNOSIS — M50.30 DDD (DEGENERATIVE DISC DISEASE), CERVICAL: Primary | ICD-10-CM

## 2018-05-14 DIAGNOSIS — G89.29 CHRONIC LEFT SHOULDER PAIN: ICD-10-CM

## 2018-05-14 DIAGNOSIS — M25.512 CHRONIC LEFT SHOULDER PAIN: ICD-10-CM

## 2018-05-14 DIAGNOSIS — M70.61 TROCHANTERIC BURSITIS OF BOTH HIPS: ICD-10-CM

## 2018-05-14 DIAGNOSIS — G89.4 CHRONIC PAIN SYNDROME: ICD-10-CM

## 2018-05-14 DIAGNOSIS — M70.62 TROCHANTERIC BURSITIS OF BOTH HIPS: ICD-10-CM

## 2018-05-14 DIAGNOSIS — M48.02 CERVICAL SPINAL STENOSIS: ICD-10-CM

## 2018-05-14 PROCEDURE — 99213 OFFICE O/P EST LOW 20 MIN: CPT | Performed by: PHYSICAL MEDICINE & REHABILITATION

## 2018-05-14 PROCEDURE — G8427 DOCREV CUR MEDS BY ELIG CLIN: HCPCS | Performed by: PHYSICAL MEDICINE & REHABILITATION

## 2018-05-14 PROCEDURE — 4040F PNEUMOC VAC/ADMIN/RCVD: CPT | Performed by: PHYSICAL MEDICINE & REHABILITATION

## 2018-05-14 PROCEDURE — G8417 CALC BMI ABV UP PARAM F/U: HCPCS | Performed by: PHYSICAL MEDICINE & REHABILITATION

## 2018-05-14 PROCEDURE — 3017F COLORECTAL CA SCREEN DOC REV: CPT | Performed by: PHYSICAL MEDICINE & REHABILITATION

## 2018-05-14 PROCEDURE — 1090F PRES/ABSN URINE INCON ASSESS: CPT | Performed by: PHYSICAL MEDICINE & REHABILITATION

## 2018-05-14 PROCEDURE — 1036F TOBACCO NON-USER: CPT | Performed by: PHYSICAL MEDICINE & REHABILITATION

## 2018-05-14 PROCEDURE — 1123F ACP DISCUSS/DSCN MKR DOCD: CPT | Performed by: PHYSICAL MEDICINE & REHABILITATION

## 2018-05-14 PROCEDURE — G8399 PT W/DXA RESULTS DOCUMENT: HCPCS | Performed by: PHYSICAL MEDICINE & REHABILITATION

## 2018-05-14 RX ORDER — HYDROCODONE BITARTRATE AND ACETAMINOPHEN 7.5; 325 MG/1; MG/1
1 TABLET ORAL 3 TIMES DAILY PRN
Qty: 90 TABLET | Refills: 0 | Status: SHIPPED | OUTPATIENT
Start: 2018-06-03 | End: 2018-07-03

## 2018-05-18 ENCOUNTER — TELEPHONE (OUTPATIENT)
Dept: ORTHOPEDIC SURGERY | Age: 68
End: 2018-05-18

## 2018-05-18 DIAGNOSIS — G89.29 CHRONIC LEFT SHOULDER PAIN: ICD-10-CM

## 2018-05-18 DIAGNOSIS — M70.61 TROCHANTERIC BURSITIS OF BOTH HIPS: ICD-10-CM

## 2018-05-18 DIAGNOSIS — M50.30 DDD (DEGENERATIVE DISC DISEASE), CERVICAL: Primary | ICD-10-CM

## 2018-05-18 DIAGNOSIS — M25.512 CHRONIC LEFT SHOULDER PAIN: ICD-10-CM

## 2018-05-18 DIAGNOSIS — G89.4 CHRONIC PAIN SYNDROME: Primary | ICD-10-CM

## 2018-05-18 DIAGNOSIS — G89.4 CHRONIC PAIN SYNDROME: ICD-10-CM

## 2018-05-18 DIAGNOSIS — M70.62 TROCHANTERIC BURSITIS OF BOTH HIPS: ICD-10-CM

## 2018-05-18 DIAGNOSIS — M48.02 CERVICAL SPINAL STENOSIS: ICD-10-CM

## 2018-05-18 RX ORDER — HYDROCODONE BITARTRATE AND ACETAMINOPHEN 7.5; 325 MG/1; MG/1
1 TABLET ORAL 3 TIMES DAILY PRN
Qty: 90 TABLET | Refills: 0 | Status: SHIPPED | OUTPATIENT
Start: 2018-05-22 | End: 2018-06-21

## 2018-05-22 ENCOUNTER — TELEPHONE (OUTPATIENT)
Dept: PAIN MANAGEMENT | Age: 68
End: 2018-05-22

## 2018-06-01 ENCOUNTER — HOSPITAL ENCOUNTER (OUTPATIENT)
Dept: OTHER | Age: 68
Discharge: HOME | End: 2018-06-30
Attending: ORTHOPAEDIC SURGERY | Admitting: ORTHOPAEDIC SURGERY

## 2018-06-01 NOTE — FLOWSHEET NOTE
Physical Therapy Daily Treatment Note  Date:  2018l late entry PT treatment completed on 18    Patient Name:  Ethan Mejia    :  1950  MRN: [id number]  Restrictions/Precautions:  FOLLOW MD SURGICAL PROTOCOL FOR fULL THICKNESS RTC REPAIR OF SUPRAPSINATUS; CLACICLE RESECTION 3/6/18 SX DATE  Medical/Treatment Diagnosis Information:   · Diagnosis: Acute pain of L shoulder; L shoulder impingement, L shoulder distal Clavicle OA, L shoulder Type I Labral Tear (synovitis), Full thickness Supraspinatus RTC; 3/6/2018 S/P SAD, Distal Clavicle Excision, RCRepair with 2 rows with Augmentation Graft  · Treatment Diagnosis: Decreased R GH/upper quarter PROM/Strength    Tracking Information:  Physician Information Referring Practitioner: Dr. Renato Rucker of Care Sent Date: 3/12/18 Signed Received:    Visit Count / Total Visits   + 1/? Insurance Approved Visits  [id number] Approved Dates:     [employer] ($[phone] as of 3/12)   Progress Note/G-codes   [x]  Yes  []  No Next Due:    SEEing MD     Pain level: 4/10   Subjective:: pain in L GH is less deep and achy more superficial and tender. : See PN  : Patient reports tendemess in L GH/neck area and soreness alomg L side of neck near the bottom that is achy. Objective:   · Observation: : See PN   · 5/15: Able to raise L arm overhead fully without a weight in her L hand, 170 degrees  · : 9 weeks s/p; active R GH Flexion 95 with superior glide of humeral head and minimal lower trapezius recruitment  · : See manual below  · : See PN  · :  Patient 6 weeks s/p RTC repair  · 4/10: Patient 5 weeks s/p RTC repair  · 3/20:  Pt to clinic in sling this date. Able to don and doff independently without difficulty  Test measurements: : after exercise: PROM ER 70, IR 55, PROM Flex 155,    5/10: after tx: flexion 160, (scaption) .  ER 80 (AB 90), IR 40  : after tx: flexion 150, AB 120. ER 55 (AB 90)  4/10:  after tx: flexion 138, AB 90. ER 45 (AB 90)  4/2: after tx: flexion 122, AB 90. ER 40 (AB 90)  3/29: after tx: flexion 110  3/27: after tx: L GH ER 45 (AB45) flexion    3/22: after tx: L GH ER 40 (AB45) flexion   3/22: flexion 110, ER 40 degrees (AB 45)  3/20:  Formal measurements not taken this date however pt reaching 110 degrees shoulder flexion passively and painfree    Exercises:  Exercise/Equipment Resistance/Repetitions Other comments   Codmans    hand     seated         Mat         B Elbow flexion 1# 2 X10 Sleeper stretch 10\"x5    UBE/pulleys 2' FWD/2'Bkwd/scaption X10 4/26: post manual this date   Swiss Ball on mat     Wall slides AAROM B GH flexion 5\" X10        B GH Circles  small X10  Each CW/CCW at 90 degrees with gold ball   Rebounder       Sidelying Scapular      L GH isometrics    theraband Orange T-band B GH :    Mid rows X20  Lat Pull Diown X20  L GH IR X12  L GH ER X12  t   Swiss Ball     B elbow bicep curls       Other Therapeutic Activities: 3/20:  Reviewed protocol with pt and what to expect in rehab at what time. 3/12: 3/13/18 Pt was educated on PT POC, Diagnosis, Prognosis, pathomechanics as well as frequency and duration of scheduling future physical therapy appointments. Time was also taken on this day to answer all patient questions and participation in PT. Educated on PT RTC protection, modalities, and Manual progression. Home Exercise Program: 6/1: sleeper stretch, given handout  5/15: supine cane flexion, issued yellow t-band for mid row/high row, IR/ER, B GH ext  5/3: added R GH Sidelying ER, B AROM elbow flexion/extnsion, sidelying L GH flexion, serratus punches  4/17: Added L GH isometrics extension/flexion/IR/ER/abduction   4/10: added and issued HEP wall slides, cane Gh AAROM ER, to 45 degrees, GH Circles on Swiss ball  3/20:  No new additions this date  3/12:  The following exercises were performed and added to the pt's home program (educated on 15min  4/5:GRade I posterio/lateral glides R GH, Grade II long axis distraction, Gentle STM to L pec region and infraspinatus STM, PROM to L shoulder per protocol: Increasing AB to 90 degrees this dateFlexion/scaption, ER x 24 min   4/3: Gentle STM to L pec region and infraspinatus STM, PROM to L shoulder per protocol: Increasing AB to 90 degrees this dateFlexion/scaption, ER x 24 min  3/29,  3/27:  Gentle STM to L pec region and infraspinatus STM, PROM to L shoulder per protocol:  Flexion/scaption, ER x 20 min  3//22: Gentle STM to L pec region and infraspinatus STM, PROM to L shoulder per protocol:  Flexion/scaption, ER x 20 min   3/20:  PROM to L shoulder per protocol:  Flexion/scaption, ER x 20 min  3/12: Gentle L Shoulder PROM into flexion up to 100 degrees and 25 degrees Gh ER up to 25 this date X8'    Modalities: 5/25, 5/8,5/3, 5/2, 4/26: CP supine L Shoulder and B C-T paraspinals  X10' supine  4/13, 4/10: CP supine L Shoulder X10' supine  3/20:  Pt declined modalities this date    Timed Code Treatment Minutes: 40  Total Treatment Minutes: 40  Treatment/Activity Tolerance:  [x] Patient tolerated treatment well [] Patient limited by fatigue  [] Patient limited by pain  [] Patient limited by other medical complications  [] Other:     Prognosis: [x] Good [] Fair  [] Poor    Patient Requires Follow-up: [x] Yes  [] No    Plan:   [x] Continue per plan of care [] Alter current plan (see comments)  [] Plan of care initiated [] Hold pending MD visit [] Discharge    Plan for Next Session:  Follow PT Protocol    - Clinical Expectations by the end of:   Gradual reduction of guarding with PROM and mobilization.    Passive Flexion  Passive ER at 45 ABD    Week 2  90  40    Week 3  100  55    Week 4  110  70            Electronically signed by:  Melquiades Painter, PT

## 2018-06-04 ENCOUNTER — OFFICE VISIT (OUTPATIENT)
Dept: ORTHOPEDIC SURGERY | Age: 68
End: 2018-06-04

## 2018-06-04 VITALS — BODY MASS INDEX: 27.1 KG/M2 | WEIGHT: 158.73 LBS | HEIGHT: 64 IN

## 2018-06-04 DIAGNOSIS — M75.122 COMPLETE TEAR OF LEFT ROTATOR CUFF: Primary | ICD-10-CM

## 2018-06-04 PROCEDURE — 99024 POSTOP FOLLOW-UP VISIT: CPT | Performed by: ORTHOPAEDIC SURGERY

## 2018-06-12 ENCOUNTER — PRE-EVALUATION (OUTPATIENT)
Dept: ORTHOPEDIC SURGERY | Age: 68
End: 2018-06-12

## 2018-06-12 ENCOUNTER — OFFICE VISIT (OUTPATIENT)
Dept: ORTHOPEDIC SURGERY | Age: 68
End: 2018-06-12

## 2018-06-12 VITALS
HEIGHT: 63 IN | WEIGHT: 157 LBS | SYSTOLIC BLOOD PRESSURE: 122 MMHG | HEART RATE: 69 BPM | DIASTOLIC BLOOD PRESSURE: 77 MMHG | BODY MASS INDEX: 27.82 KG/M2

## 2018-06-12 DIAGNOSIS — M25.551 BILATERAL HIP PAIN: Primary | ICD-10-CM

## 2018-06-12 DIAGNOSIS — M25.552 BILATERAL HIP PAIN: Primary | ICD-10-CM

## 2018-06-12 DIAGNOSIS — M70.62 GREATER TROCHANTERIC BURSITIS OF BOTH HIPS: ICD-10-CM

## 2018-06-12 DIAGNOSIS — M70.61 GREATER TROCHANTERIC BURSITIS OF BOTH HIPS: ICD-10-CM

## 2018-06-12 PROCEDURE — G8427 DOCREV CUR MEDS BY ELIG CLIN: HCPCS | Performed by: ORTHOPAEDIC SURGERY

## 2018-06-12 PROCEDURE — G8417 CALC BMI ABV UP PARAM F/U: HCPCS | Performed by: ORTHOPAEDIC SURGERY

## 2018-06-12 PROCEDURE — 3017F COLORECTAL CA SCREEN DOC REV: CPT | Performed by: ORTHOPAEDIC SURGERY

## 2018-06-12 PROCEDURE — 4040F PNEUMOC VAC/ADMIN/RCVD: CPT | Performed by: ORTHOPAEDIC SURGERY

## 2018-06-12 PROCEDURE — 1036F TOBACCO NON-USER: CPT | Performed by: ORTHOPAEDIC SURGERY

## 2018-06-12 PROCEDURE — APPNB30 APP NON BILLABLE TIME 0-30 MINS: Performed by: PHYSICIAN ASSISTANT

## 2018-06-12 PROCEDURE — G8399 PT W/DXA RESULTS DOCUMENT: HCPCS | Performed by: ORTHOPAEDIC SURGERY

## 2018-06-12 PROCEDURE — 1123F ACP DISCUSS/DSCN MKR DOCD: CPT | Performed by: ORTHOPAEDIC SURGERY

## 2018-06-12 PROCEDURE — 1090F PRES/ABSN URINE INCON ASSESS: CPT | Performed by: ORTHOPAEDIC SURGERY

## 2018-06-12 PROCEDURE — 20611 DRAIN/INJ JOINT/BURSA W/US: CPT | Performed by: ORTHOPAEDIC SURGERY

## 2018-06-12 PROCEDURE — 99213 OFFICE O/P EST LOW 20 MIN: CPT | Performed by: ORTHOPAEDIC SURGERY

## 2018-06-12 PROCEDURE — 1101F PT FALLS ASSESS-DOCD LE1/YR: CPT | Performed by: ORTHOPAEDIC SURGERY

## 2018-06-12 NOTE — PROGRESS NOTES
Impingement    [] Shuck test for insufficient suction seal   [] Dial test for capsular insufficiency:    [] Resisted adduction for athletic pubalgia   [] Resisted curl up for athletic pubalgia     Motor Function:  [x] No gross motor weakness of hip [x] No gross motor weakness of knee  [x] No gross motor weakness of ankle    [x] No gross motor weakness of great toe    [x] Motor strength:   [x] Hip Flex [x] 5/5 [] 4/5 [] 3/5 [] 2/5 [] 1/5 [] 0/5   [x] Hip ABductors [x] 5-/5 [] 4/5 [] 3/5 [] 2/5 [] 1/5 [] 0/5   [x] Hip ADductors [x] 5/5 [] 4/5 [] 3/5 [] 2/5 [] 1/5 [] 0/5     Neurologic:   [x] Sensation to light touch intact  [x] Coordination / proprioception intact  Motor function intact L2-S1    Circulation:  [x] The limb is warm and well perfused. [x] Capillary refill is intact.   [] Edema:  [x] none  [] mild  [] moderate  [] severe     LEFT HIP ORTHOPAEDIC  EXAM:  Inspection:  [x] Skin intact without abrasion, lacerations or rashes  [x] Leg lengths equal  [x] Ecchymosis:  [x] none  [] mild  [] moderate  [] severe   [x] Atrophy:  [x] none  [] mild  [] moderate  [] severe      Range of Motion:  [x] No flexion contracture         [] Deferred: acute injury/post-surgery/pain  [] Flexion contracture     Forward flexion: 110  Supine Internal rotation: 20 Negative labral stress test  Supine External rotation: 65  Abduction: 60  Adduction: 30    Palpation:   Mild Tenderness over greater trochanter    Provocative Tests:  [x] Negative: Logroll, FADIR  Positive Tests:  [] Log Roll   [] Gabe Test: ITB Tightness   [] FADIR Anterior impingement: Negative   [] Posterior Impingement    [] Shuck test for insufficient suction seal   [] Dial test for capsular insufficiency:    [] Resisted adduction for athletic pubalgia   [] Resisted curl up for athletic pubalgia     Motor Function:  [x] No gross motor weakness of hip [x] No gross motor weakness of knee  [x] No gross motor weakness of ankle    [x] No gross motor weakness of great toe    [x] Motor strength:   [x] Hip Flex [x] 5/5 [] 4/5 [] 3/5 [] 2/5 [] 1/5 [] 0/5   [x] Hip ABductors [x] 5-/5 [] 4/5 [] 3/5 [] 2/5 [] 1/5 [] 0/5   [x] Hip ADductors [x] 5/5 [] 4/5 [] 3/5 [] 2/5 [] 1/5 [] 0/5     Neurologic:  [x] Sensation to light touch intact  [x] Coordination / proprioception intact    Circulation:  [x] The limb is warm and well perfused. [x] Capillary refill is intact. [] Edema:  [x] none  [] mild  [] moderate  [] severe     Data Reviewed:     XRays:  (2 views: Standing AP, 45 degree Davis) of her bilateral hip and pelvis taken today 6/12/18 in the office and reviewed by me personally showed: Well preserved joint space. No radiolucent lines to suggest fracture or any osteoblastic lesions. Assessment:     Lyubov Mclain is a 78y.o. year old female who presents with bilateral, right greater than left sided Greater trochanteric pain syndrome. This is a condition which is comprised of trochanteric bursitis, IT band focal tightness, and gluteus medius tendinopathy. It is a chronic condition very commonly seen in this age group. Generally, speaking extra-articular etiologies of hip pain respond extremely well to nonoperative management involving PT, injections and/or NSAIDs, although the rate of clinical improvement is slow and requires patience and consistency with therapy. Diagnosis:    Diagnosis Orders   1. Bilateral hip pain  XR HIP BILATERAL W AP PELVIS (2 VIEWS)    [facility] PT - [facility]   2. Greater trochanteric bursitis of both hips  [facility] PT - [facility]       Plan:     I discussed the diagnosis and the treatment options with Lyubov Mclain today as well as the nature of the condition. I am going to refer for PT using my specialized program for this condition. Patient  also wanted an injection into her right side and I think this is reasonable. I will see her back in 6-8 weeks, for reevaluation. I did answer questions today.  She is in agreement

## 2018-06-22 ENCOUNTER — OFFICE VISIT (OUTPATIENT)
Dept: PAIN MANAGEMENT | Age: 68
End: 2018-06-22

## 2018-06-22 VITALS
SYSTOLIC BLOOD PRESSURE: 126 MMHG | HEIGHT: 64 IN | HEART RATE: 63 BPM | BODY MASS INDEX: 26.8 KG/M2 | DIASTOLIC BLOOD PRESSURE: 79 MMHG | WEIGHT: 157 LBS

## 2018-06-22 DIAGNOSIS — M79.7 FIBROMYALGIA: ICD-10-CM

## 2018-06-22 DIAGNOSIS — G89.4 CHRONIC PAIN SYNDROME: ICD-10-CM

## 2018-06-22 DIAGNOSIS — M48.02 CERVICAL SPINAL STENOSIS: ICD-10-CM

## 2018-06-22 DIAGNOSIS — M50.30 DDD (DEGENERATIVE DISC DISEASE), CERVICAL: ICD-10-CM

## 2018-06-22 PROCEDURE — 4040F PNEUMOC VAC/ADMIN/RCVD: CPT | Performed by: INTERNAL MEDICINE

## 2018-06-22 PROCEDURE — G8427 DOCREV CUR MEDS BY ELIG CLIN: HCPCS | Performed by: INTERNAL MEDICINE

## 2018-06-22 PROCEDURE — G8399 PT W/DXA RESULTS DOCUMENT: HCPCS | Performed by: INTERNAL MEDICINE

## 2018-06-22 PROCEDURE — 1090F PRES/ABSN URINE INCON ASSESS: CPT | Performed by: INTERNAL MEDICINE

## 2018-06-22 PROCEDURE — 99204 OFFICE O/P NEW MOD 45 MIN: CPT | Performed by: INTERNAL MEDICINE

## 2018-06-22 PROCEDURE — 1036F TOBACCO NON-USER: CPT | Performed by: INTERNAL MEDICINE

## 2018-06-22 PROCEDURE — G8417 CALC BMI ABV UP PARAM F/U: HCPCS | Performed by: INTERNAL MEDICINE

## 2018-06-22 PROCEDURE — 1123F ACP DISCUSS/DSCN MKR DOCD: CPT | Performed by: INTERNAL MEDICINE

## 2018-06-22 PROCEDURE — 3017F COLORECTAL CA SCREEN DOC REV: CPT | Performed by: INTERNAL MEDICINE

## 2018-06-22 RX ORDER — HYDROCODONE BITARTRATE AND ACETAMINOPHEN 7.5; 325 MG/1; MG/1
1 TABLET ORAL EVERY 6 HOURS PRN
Qty: 70 TABLET | Refills: 0 | Status: SHIPPED | OUTPATIENT
Start: 2018-06-22 | End: 2018-07-20 | Stop reason: SDUPTHER

## 2018-06-22 RX ORDER — NORTRIPTYLINE HYDROCHLORIDE 25 MG/1
25-50 CAPSULE ORAL NIGHTLY
Qty: 60 CAPSULE | Refills: 0 | Status: SHIPPED | OUTPATIENT
Start: 2018-06-22 | End: 2018-07-20 | Stop reason: SDUPTHER

## 2018-06-22 RX ORDER — PREGABALIN 75 MG/1
CAPSULE ORAL
Qty: 90 CAPSULE | Refills: 0 | Status: SHIPPED | OUTPATIENT
Start: 2018-06-22 | End: 2018-07-20

## 2018-07-01 ENCOUNTER — HOSPITAL ENCOUNTER (OUTPATIENT)
Dept: OTHER | Age: 68
Discharge: HOME | End: 2018-07-31
Attending: ORTHOPAEDIC SURGERY | Admitting: ORTHOPAEDIC SURGERY

## 2018-07-02 ENCOUNTER — TELEPHONE (OUTPATIENT)
Dept: PAIN MANAGEMENT | Age: 68
End: 2018-07-02

## 2018-07-09 ENCOUNTER — HOSPITAL ENCOUNTER (OUTPATIENT)
Dept: OTHER | Age: 68
Discharge: HOME | End: 2018-07-09
Attending: INTERNAL MEDICINE | Admitting: INTERNAL MEDICINE

## 2018-07-09 DIAGNOSIS — G89.4 CHRONIC PAIN SYNDROME: ICD-10-CM

## 2018-07-09 DIAGNOSIS — M48.02 CERVICAL SPINAL STENOSIS: ICD-10-CM

## 2018-07-09 DIAGNOSIS — M79.7 FIBROMYALGIA: ICD-10-CM

## 2018-07-09 DIAGNOSIS — M50.30 DDD (DEGENERATIVE DISC DISEASE), CERVICAL: ICD-10-CM

## 2018-07-09 LAB
A/G RATIO: 1.9 (ref 1.1–2.2)
ALBUMIN SERPL-MCNC: 4.7 G/DL (ref 3.4–5)
ALP BLD-CCNC: 41 U/L (ref 40–129)
ALT SERPL-CCNC: 12 U/L (ref 10–40)
AMPHETAMINE SCREEN, URINE: ABNORMAL
ANION GAP SERPL CALCULATED.3IONS-SCNC: 9 MMOL/L (ref 3–16)
AST SERPL-CCNC: 16 U/L (ref 15–37)
BARBITURATE SCREEN URINE: ABNORMAL
BENZODIAZEPINE SCREEN, URINE: ABNORMAL
BILIRUB SERPL-MCNC: 0.4 MG/DL (ref 0–1)
BUN BLDV-MCNC: 8 MG/DL (ref 7–20)
CALCIUM SERPL-MCNC: 9.5 MG/DL (ref 8.3–10.6)
CANNABINOID SCREEN URINE: ABNORMAL
CHLORIDE BLD-SCNC: 105 MMOL/L (ref 99–110)
CO2: 27 MMOL/L (ref 21–32)
COCAINE METABOLITE SCREEN URINE: ABNORMAL
CREAT SERPL-MCNC: 0.6 MG/DL (ref 0.6–1.2)
GFR AFRICAN AMERICAN: >60
GFR NON-AFRICAN AMERICAN: >60
GLOBULIN: 2.5 G/DL
GLUCOSE BLD-MCNC: 92 MG/DL (ref 70–99)
HCT VFR BLD CALC: 40.4 % (ref 36–48)
HEMOGLOBIN: 14.1 G/DL (ref 12–16)
Lab: ABNORMAL
MCH RBC QN AUTO: 32.5 PG (ref 26–34)
MCHC RBC AUTO-ENTMCNC: 34.8 G/DL (ref 31–36)
MCV RBC AUTO: 93.3 FL (ref 80–100)
METHADONE SCREEN, URINE: ABNORMAL
OPIATE SCREEN URINE: POSITIVE
OXYCODONE URINE: ABNORMAL
PDW BLD-RTO: 13.3 % (ref 12.4–15.4)
PH UA: 7
PHENCYCLIDINE SCREEN URINE: ABNORMAL
PLATELET # BLD: 236 K/UL (ref 135–450)
PMV BLD AUTO: 8.1 FL (ref 5–10.5)
POTASSIUM SERPL-SCNC: 5.4 MMOL/L (ref 3.5–5.1)
PROPOXYPHENE SCREEN: ABNORMAL
RBC # BLD: 4.33 M/UL (ref 4–5.2)
SODIUM BLD-SCNC: 141 MMOL/L (ref 136–145)
TOTAL PROTEIN: 7.2 G/DL (ref 6.4–8.2)
TSH SERPL DL<=0.05 MIU/L-ACNC: 1.46 UIU/ML (ref 0.27–4.2)
WBC # BLD: 5.3 K/UL (ref 4–11)

## 2018-07-20 ENCOUNTER — OFFICE VISIT (OUTPATIENT)
Dept: PAIN MANAGEMENT | Age: 68
End: 2018-07-20

## 2018-07-20 VITALS
BODY MASS INDEX: 27.64 KG/M2 | HEART RATE: 72 BPM | WEIGHT: 161 LBS | SYSTOLIC BLOOD PRESSURE: 125 MMHG | DIASTOLIC BLOOD PRESSURE: 72 MMHG

## 2018-07-20 DIAGNOSIS — M50.30 DDD (DEGENERATIVE DISC DISEASE), CERVICAL: ICD-10-CM

## 2018-07-20 DIAGNOSIS — M79.7 FIBROMYALGIA: ICD-10-CM

## 2018-07-20 DIAGNOSIS — G89.4 CHRONIC PAIN SYNDROME: ICD-10-CM

## 2018-07-20 DIAGNOSIS — M48.02 CERVICAL SPINAL STENOSIS: ICD-10-CM

## 2018-07-20 PROCEDURE — G8417 CALC BMI ABV UP PARAM F/U: HCPCS | Performed by: INTERNAL MEDICINE

## 2018-07-20 PROCEDURE — G8427 DOCREV CUR MEDS BY ELIG CLIN: HCPCS | Performed by: INTERNAL MEDICINE

## 2018-07-20 PROCEDURE — 3017F COLORECTAL CA SCREEN DOC REV: CPT | Performed by: INTERNAL MEDICINE

## 2018-07-20 PROCEDURE — 1090F PRES/ABSN URINE INCON ASSESS: CPT | Performed by: INTERNAL MEDICINE

## 2018-07-20 PROCEDURE — 1036F TOBACCO NON-USER: CPT | Performed by: INTERNAL MEDICINE

## 2018-07-20 PROCEDURE — G8399 PT W/DXA RESULTS DOCUMENT: HCPCS | Performed by: INTERNAL MEDICINE

## 2018-07-20 PROCEDURE — 1123F ACP DISCUSS/DSCN MKR DOCD: CPT | Performed by: INTERNAL MEDICINE

## 2018-07-20 PROCEDURE — 99213 OFFICE O/P EST LOW 20 MIN: CPT | Performed by: INTERNAL MEDICINE

## 2018-07-20 PROCEDURE — 1101F PT FALLS ASSESS-DOCD LE1/YR: CPT | Performed by: INTERNAL MEDICINE

## 2018-07-20 PROCEDURE — 4040F PNEUMOC VAC/ADMIN/RCVD: CPT | Performed by: INTERNAL MEDICINE

## 2018-07-20 RX ORDER — NORTRIPTYLINE HYDROCHLORIDE 25 MG/1
25-50 CAPSULE ORAL NIGHTLY
Qty: 60 CAPSULE | Refills: 1 | Status: SHIPPED | OUTPATIENT
Start: 2018-07-20 | End: 2018-08-21 | Stop reason: SDUPTHER

## 2018-07-20 RX ORDER — GABAPENTIN 300 MG/1
300 CAPSULE ORAL DAILY
Qty: 30 CAPSULE | Refills: 1 | Status: SHIPPED | OUTPATIENT
Start: 2018-07-20 | End: 2018-08-21 | Stop reason: SDUPTHER

## 2018-07-20 RX ORDER — HYDROCODONE BITARTRATE AND ACETAMINOPHEN 7.5; 325 MG/1; MG/1
1 TABLET ORAL EVERY 6 HOURS PRN
Qty: 90 TABLET | Refills: 0 | Status: SHIPPED | OUTPATIENT
Start: 2018-07-20 | End: 2018-08-21 | Stop reason: SDUPTHER

## 2018-07-20 NOTE — PROGRESS NOTES
psychosocial and physical functioning. - she is showing progression towards this treatment goal with the current regimen. She was advised against drinking alcohol with the narcotic pain medicines, advised against driving or handling machinery while adjusting the dose of medicines or if having cognitive  issues related to the current medications. Risk of overdose and death, if medicines not taken as prescribed, were also discussed. If the patient develops new symptoms or if the symptoms worsen, the patient should call the office. While transcribing every attempt was made to maintain the accuracy of the note in terms of it's contents,there may have been some errors made inadvertently. Thank you for allowing me to participate in the care of this patient.     Blaise Jarvis MD.    Cc: Benjamin Baez MD    I, Selma Hurley, am scribing for and in the presence of Dr. Blaise Jarvis.   07/20/18  12:01 PM  ANDREINA Mosley, Dr. Blaise Jarvis, personally performed the services described in this documentation as scribed by   Selma Hurley MA in my presence and it is both accurate and complete

## 2018-07-23 ENCOUNTER — OFFICE VISIT (OUTPATIENT)
Dept: ORTHOPEDIC SURGERY | Age: 68
End: 2018-07-23

## 2018-07-23 VITALS
DIASTOLIC BLOOD PRESSURE: 86 MMHG | SYSTOLIC BLOOD PRESSURE: 139 MMHG | WEIGHT: 155 LBS | HEIGHT: 65 IN | BODY MASS INDEX: 25.83 KG/M2 | HEART RATE: 72 BPM

## 2018-07-23 DIAGNOSIS — M70.62 GREATER TROCHANTERIC BURSITIS OF BOTH HIPS: Primary | ICD-10-CM

## 2018-07-23 DIAGNOSIS — M70.61 GREATER TROCHANTERIC BURSITIS OF BOTH HIPS: Primary | ICD-10-CM

## 2018-07-23 PROCEDURE — G8417 CALC BMI ABV UP PARAM F/U: HCPCS | Performed by: ORTHOPAEDIC SURGERY

## 2018-07-23 PROCEDURE — G8427 DOCREV CUR MEDS BY ELIG CLIN: HCPCS | Performed by: ORTHOPAEDIC SURGERY

## 2018-07-23 PROCEDURE — 4040F PNEUMOC VAC/ADMIN/RCVD: CPT | Performed by: ORTHOPAEDIC SURGERY

## 2018-07-23 PROCEDURE — 1123F ACP DISCUSS/DSCN MKR DOCD: CPT | Performed by: ORTHOPAEDIC SURGERY

## 2018-07-23 PROCEDURE — 99213 OFFICE O/P EST LOW 20 MIN: CPT | Performed by: ORTHOPAEDIC SURGERY

## 2018-07-23 PROCEDURE — G8399 PT W/DXA RESULTS DOCUMENT: HCPCS | Performed by: ORTHOPAEDIC SURGERY

## 2018-07-23 PROCEDURE — 1036F TOBACCO NON-USER: CPT | Performed by: ORTHOPAEDIC SURGERY

## 2018-07-23 PROCEDURE — 1090F PRES/ABSN URINE INCON ASSESS: CPT | Performed by: ORTHOPAEDIC SURGERY

## 2018-07-23 PROCEDURE — 1101F PT FALLS ASSESS-DOCD LE1/YR: CPT | Performed by: ORTHOPAEDIC SURGERY

## 2018-07-23 PROCEDURE — 3017F COLORECTAL CA SCREEN DOC REV: CPT | Performed by: ORTHOPAEDIC SURGERY

## 2018-07-23 NOTE — LETTER
Hip Rehabilitation Referral    Patient Name: Claudia Quinones      YOB: 1950    Diagnosis: Bilateral Greater Trochanteric Bursitis / Hip pain     Precautions: N/A    Evaluate and Treat          Stretching and soft tissue mobs:  Strengthening:  [x] IT Band     [x] Core stabilization  [x] Adductors     [x] Glute eccentric progressing to concentric  [x] Hip Flexors     [] Pelvic and trunk strengthening  [x] Gluteals     [] Abductors     [x] Iliopsoas complex    [] Flexors  [x] Rectus femoris     [x] Progressive resistive exercises  [] TFL        [] Sartorius                     Activities:       [x] Kinematic Retraining       [x] Activity modification     [x] Patient education to avoid continued irritation with ADLs  [x] Normalization of gait    Modalities:     Return to Sport:  [x] Ultrasound     [] Plyometrics  [] Iontophoresis    [] Rhythmic stabilization  [x] Moist heat     [] Core strengthening   [x] Massage     [] Sports specific program:      [x] Cryotherapy      [] Electrical stimulation     [] Paraffin  [] Whirlpool  [] TENS  [x] Per therapist discretion  [x] Home exercise program (copy to patient). Perform exercises for:  30    minutes   2- 3      times/day  [x] Supervised physical therapy  Frequency: []  1x week  [x] 2x week  [] 3x week  [] Other:   Duration: [] 2 weeks   [] 4 weeks  [x] 6 weeks  [] Other:     Additional Instructions:       Hip pain   Focus on core stabilization, glute & eccentric hip flexor strengthening progressing to concentric. Soft tissue mobs focus on hip adductors, rectus femoris, iliopsoas, TFL, & gluteals. Kinematic re-training and activity modification as indicated. Trochanteric Bursitis/Glutes Medius Tendinitis/tendinopathy:   Soft tissue mobs to Glutes,  Adductors,  ITB, primary and secondary hip flexors. Core stabilization, pelvic and trunk control exercise, Glute strengthening.   Activity modification and Pt education to avoid continued irritation with ADLs. Normalize of gait. AVOID LONG LEVER SIDE or STRAIGHT LEG RAISE.

## 2018-07-23 NOTE — PROGRESS NOTES
Yris Roberto MD  Orthopaedic Surgery & Sports Medicine  Shoulder, Hip & Knee Specialist                       MAIN PHONE NUMBER  [phone]      PATIENT: Drew Edge    78 y.o.  female  [facility]: 1950   MRN:  [id number]       Date of current encounter: 7/23/2018  This encounter is evaluated as a:       [] New Patient Visit    [x] Established Patient Visit   [] Post-Op Visit     [] Consult: requested by          [] Worker's Comp       Patient's PCP is Dr. Emily Hernandez MD    Subjective:     Chief Complaint   Patient presents with    Follow-up     rt hip       HPI:  Patient follows up with us today regarding her right and left hips. She still has quite a bit of lateral sided hip pain. She has not yet done any physical therapy    There is no pain radiating down the leg. There is no pain radiating to the groin.         Pain Assessment  Location of Pain:  (hip)  Location Modifiers: Right  Severity of Pain: 7  Quality of Pain: Sharp, Dull, Aching  Duration of Pain: Persistent  Frequency of Pain: Intermittent  Aggravating Factors:  (positional)  Limiting Behavior: Some  Result of Injury: No  Work-Related Injury: No  Are there other pain locations you wish to document?: No    Patient Active Problem List   Diagnosis    Superior glenoid labrum lesion of left shoulder    Complete tear of left rotator cuff    Tear of left glenoid labrum    Acromioclavicular joint arthritis    Subacromial impingement, right    Chronic pain syndrome    Fibromyalgia    DDD (degenerative disc disease), cervical    Cervical spinal stenosis     Past Medical History:   Diagnosis Date    Asthma     as a child     Past Surgical History:   Procedure Laterality Date    BUNIONECTOMY      EYE SURGERY      steve cateracts, detached retina    SHOULDER ARTHROSCOPY Left 03/06/2018    left shoulder, SAD, DCE, Rotator cuff repair with graft, extensive debridement    TUBAL LIGATION         Allergies:  Tramadol; Sulfa antibiotics; and Codeine    Medications:  Outpatient Prescriptions Marked as Taking for the 7/23/18 encounter (Office Visit) with Richard Mejias MD   Medication Sig Dispense Refill    nortriptyline (PAMELOR) 25 MG capsule Take 1-2 capsules by mouth nightly 60 capsule 1    HYDROcodone-acetaminophen (NORCO) 7.5-325 MG per tablet Take 1 tablet by mouth every 6 hours as needed for Pain (max 3 per day) for up to 30 days. . 90 tablet 0    gabapentin (NEURONTIN) 300 MG capsule Take 1 capsule by mouth daily for 30 days. . 30 capsule 1    methocarbamol (ROBAXIN) 750 MG tablet Take by mouth 3 times daily as needed       Zolpidem Tartrate (AMBIEN PO) Take 10 mg by mouth daily as needed        Social History     Social History    Marital status:      Spouse name: N/A    Number of children: N/A    Years of education: N/A     Occupational History    Not on file. Social History Main Topics    Smoking status: Former Smoker     Packs/day: 1.00     Years: 20.00     Quit date: 1/1/2005    Smokeless tobacco: Never Used    Alcohol use Yes      Comment: 2 per week    Drug use: No    Sexual activity: Not on file     Other Topics Concern    Not on file     Social History Narrative    No narrative on file     No family history on file. Review of Systems:    Claudia Doran's reported review of systems has been reviewed and has been scanned into her medical record for today's visit. The scanned image can be found in media images folder. She was instructed to contact her primary care physician regarding ROS positives if not already being addressed during today's visit. Objective:   Physical Exam  Vital Signs:  /86   Pulse 72   Ht 5' 4.5\" (1.638 m)   Wt 155 lb (70.3 kg)   BMI 26.19 kg/m²     Constitution:  Generally, Annie Batista is [x] alert, [x] appears stated age, and [x] in no distress.   Her general body habitus is [] Cachectic  [] Thin  [x] Normal  [] Obese  [] Morbidly Obese    Head: [x] Normocephalic  Eyes: [x] Extra-occular muscles intact  Left Ear: [x] External Ear normal   Right Ear: [x] External Ear normal   Nose: [x] Normal  Mouth: [x] Oral mucosa moist  [x] No perioral lesions    Pulmonary: [x] Respirations unlabored and regular  Skin: [x] Warm [x] Well perfused     Psychiatric:   [x] Good judgement and insight  [x] Oriented to [x] person, [x] place, and [x] time  [x] Mood appropriate for circumstances    Gait:   Gait is [x] Normal  [] Impaired   Assistive Device: [] None  [] Knee Brace  [] Cane  [] Crutches   [] Vergie Late   [] Wheelchair  [] Other     ORTHOPAEDIC LS SPINE EXAM   Inspection:  [x] Skin intact without abrasion, lacerations, surgical scars, pigment changes, dimpling, hairy patches or rashes  [x] Normal back alignment  [] Scoliosis [] Kyphosis  [] Dimpling  [] Hyper/hypopigmentation  [] Hairy Patches  [] Scar / Surgical incision    Palpation:   Nontender    Provocative Tests:  Negative Straight leg raise test    RIGHT HIP ORTHOPAEDIC  EXAM:   Inspection:  [x] Skin intact without abrasion, lacerations or rashes  [x] Leg lengths equal  [] Ecchymosis:  [x] none  [] mild  [] moderate  [] severe   [] Atrophy:  [x] none  [] mild  [] moderate  [] severe      Range of Motion:  [x] No flexion contracture         [] Deferred: acute injury/post-surgery/pain  [] Flexion contracture    Forward flexion: 110  Supine Internal rotation: 20 Negative labral stress test  Supine External rotation: 65  Abduction: 60  Adduction: 30      Palpation:   Positive Tenderness over greater trochanter    Provocative Tests:  [] Negative  Positive Tests:  [] Log Roll   [x] Gabe Test: ITB Tightness   [] FADIR Anterior impingement: Negative   [] Posterior Impingement    [] Shuck test for insufficient suction seal   [] Dial test for capsular insufficiency:    [] Resisted adduction for athletic pubalgia   [] Resisted curl up for athletic pubalgia     Motor Function:  [x] No gross motor weakness of hip [x] No gross motor Coordination / proprioception intact    Circulation:  [x] The limb is warm and well perfused. [x] Capillary refill is intact. [] Edema:  [x] none  [] mild  [] moderate  [] severe     Data Reviewed:     No new imaging    Assessment:     Danielle Beebe is a 78y.o. year old female who presents with Right and left sided Greater trochanteric pain syndrome. This is a condition which is comprised of trochanteric bursitis, IT band focal tightness, and gluteus medius tendinopathy. It is a chronic condition very commonly seen in this age group. Generally, speaking extra-articular etiologies of hip pain respond extremely well to nonoperative management involving PT, injections and/or NSAIDs, although the rate of clinical improvement is slow and requires patience and consistency with therapy. Diagnosis:    Diagnosis Orders   1. Greater trochanteric bursitis of both hips  [facility] PT - [facility]         Plan:     I discussed the diagnosis and the treatment options with Danielle Beebe today as well as the nature of the condition. I am going to refer for PT using my specialized program for this condition. Return to Clinic/Follow - Up:  6-8 weeks PRN    Danielle Beebe was instructed to call the office if her symptoms worsen or if new symptoms appear prior to the next scheduled visit. She is specifically instructed to contact the office between now & her scheduled appointment if she has concerns related to her condition or if she needs assistance in scheduling the above tests. She is welcome to call for an appointment sooner if she has any additional concerns or questions. Patient Education Materials Provided:  [x] Dr Christine Salinas: New patient folder,  Anatomic Drawings and treatment algorithms    There are no Patient Instructions on file for this visit.        Orders Placed This Encounter   Procedures    [facility] PT - [facility]     Referral Priority:   Routine     Referral Type: Eval and Treat     Referral Reason:   Specialty Services Required     Requested Specialty:   Physical Therapy     Number of Visits Requested:   1       Refills/New Prescriptions:  No orders of the defined types were placed in this encounter. Today's prescription medications will be e-scribed (when appropriate) to the Patient's Preferred Pharmacy:   [facility] [address], [address] [facility] [address]  [address] beds at the [facility] [address]  [address] [place]  Phone: [phone] Fax: [phone]         Twyla Mclaughlin MD  Orthopaedic Surgeon, Sports Medicine  Director, Hip Arthroscopy and [address] [place] [address]    Contact Information:  ([facility] [address] [phone])  [facility] main number: use after hours [phone])    This dictation was performed with a verbal recognition program (DRAGON) and it was checked for errors. It is possible that there are still dictated errors within this office note. If so, please bring any errors to my attention for an addendum. All efforts were made to ensure that this office note is accurate.

## 2018-08-07 ENCOUNTER — HOSPITAL ENCOUNTER (OUTPATIENT)
Dept: PHYSICAL THERAPY | Age: 68
Discharge: HOME | End: 2018-08-31
Attending: ORTHOPAEDIC SURGERY | Admitting: ORTHOPAEDIC SURGERY

## 2018-08-07 ASSESSMENT — PAIN DESCRIPTION - PAIN TYPE: TYPE: CHRONIC PAIN

## 2018-08-07 ASSESSMENT — PAIN DESCRIPTION - ORIENTATION: ORIENTATION: LEFT;RIGHT

## 2018-08-07 ASSESSMENT — PAIN SCALES - GENERAL: PAINLEVEL_OUTOF10: 5

## 2018-08-07 ASSESSMENT — PAIN DESCRIPTION - FREQUENCY: FREQUENCY: CONTINUOUS

## 2018-08-07 ASSESSMENT — PAIN DESCRIPTION - DESCRIPTORS: DESCRIPTORS: STABBING

## 2018-08-07 ASSESSMENT — PAIN DESCRIPTION - LOCATION: LOCATION: HIP

## 2018-08-07 ASSESSMENT — PAIN DESCRIPTION - PROGRESSION: CLINICAL_PROGRESSION: GRADUALLY WORSENING

## 2018-08-07 NOTE — PROGRESS NOTES
Limits  Hearing  Hearing: Within functional limits    Orientation  Orientation  Overall Orientation Status: Within Normal Limits    Social/Functional History  Social/Functional History  Lives With: Spouse  Type of Home: House  Home Layout: Two level  Home Access: Stairs to enter with rails  Entrance Stairs - Number of Steps: 4  Bathroom Shower/Tub: Walk-in shower  Bathroom Toilet: Standard  Receives Help From: Family  ADL Assistance: Independent  Homemaking Assistance: Independent  Homemaking Responsibilities: Yes  Ambulation Assistance: Independent  Transfer Assistance: Independent  Active : Yes  Mode of Transportation: Car  Occupation: Retired  Additional Comments: 4 grandkids she likes to do things with. Pt very active working out, hiking. Pt belongs to [employer].   Objective        POsture: Alignment: see spine; TTP R piriformis and B PSIS  PROM RLE (degrees)  RLE PROM: Exceptions  R Hip Flexion 0-125: 125  R Hip Extension 0-10: 0  R Hip ABduction 0-45: 33  R Hip External Rotation 0-45: 36  R Hip Internal Rotation 0-45: 30  PROM LLE (degrees)  LLE PROM: Exceptions  L Hip Flexion 0-125: 112  L Hip Extension 0-10: 5 from neutral  L Hip ABduction 0-45: 30  L Hip External Rotation 0-45: 30  L Hip Internal Rotation 0-45: 38  Spine  Lumbar: L5 L Lateral shift noted; R pubic upslip, L5 NRLSR/ERSL, LOL sacral torsion  Special Tests: positive R PSIS movement with [place] test    Strength RLE  Strength RLE: Exception  R Hip Flexion: 4/5  R Hip Extension: 3+/5  R Hip ABduction: 3/5  R Hip Internal Rotation: 4/5  R Hip External Rotation: 4-/5  R Knee Flexion: 4+/5  R Knee Extension: 5/5  R Ankle Dorsiflexion: 5/5  R Ankle Plantar flexion: 5/5  Strength LLE  Strength LLE: Exception  L Hip Flexion: 3+/5  L Hip Extension: 3-/5  L Hip ABduction: 2+/5  L Hip Internal Rotation: 3+/5  L Hip External Rotation: 3+/5  L Knee Flexion: 4+/5  L Knee Extension: 5/5  L Ankle Dorsiflexion: 5/5  L Ankle Plantar Flexion: 5/5 training, Positioning, Manual Therapy - Joint Manipulation, Manual Therapy - Soft Tissue Mobilization, Modalities, Patient/Caregiver Education & Training, Neuromuscular Re-education, Home Exercise Program    G-Code  PT G-Codes  Functional Assessment Tool Used: LEFS   Score: 56= 20-39%  Functional Limitation: Mobility: Walking and moving around  Mobility: Walking and Moving Around Current Status (): At least 20 percent but less than 40 percent impaired, limited or restricted  Mobility: Walking and Moving Around Goal Status (): 0 percent impaired, limited or restricted    OutComes Score  LEFS Total Score: 56                                        Goals  Long term goals  Time Frame for Long term goals : 6 weeks  Long term goal 1: Patient to increase B hip stremgth to 4/5 or greater to be able to ascend stairs with 2/10 or less pain reciprocal.  Long term goal 2: Patient to have L=R pelvic alignment and be I with hookline dynamic Lumbar stabilization exercises by discharge to return to prolonged standing activities/IADLS. Long term goal 3: Patient to be able to walk up to 2 hours with pain 2/10 or less in B Hips/back to return to enjoying social/recreatoal activities by discharge. Long term goal 4: Patient to be able to tolerate sitting in a car 4 hours or more for traveling by discharge.       Patient Goals   Patient goals : decrease pain       Therapy Time   Individual Concurrent Group Co-treatment   Time In 1330         Time Out 1435         Minutes 65         Timed Code Treatment Minutes: [address], PT

## 2018-08-07 NOTE — FLOWSHEET NOTE
the pt's home program (educated on appropriate frequency, intensity and duration etc.): gluteal sets, pririformis stretch    Manual Treatments:  8/7: MET to correct  MET to correct R pubic upslip, L5 NRLSR/ERSL, LOL sacral torsion, L SI posterior innominate SI, R SI anterior innominate SI    Modalities:  8/7: US R piriformis 1.0 w/cm X7' 50% pulsed prone  Timed Code Treatment Minutes:  43    Total Treatment Minutes:  65    Treatment/Activity Tolerance:  [x] Patient tolerated treatment well [] Patient limited by fatigue  [] Patient limited by pain  [] Patient limited by other medical complications  [] Other:     Prognosis: [x] Good [] Fair  [] Poor    Patient Requires Follow-up: [x] Yes  [] No    Plan:   [x] Continue per plan of care [] Alter current plan (see comments)  [x] Plan of care initiated [] Hold pending MD visit [] Discharge  Plan for Next Session:  See aquatic f/s  Electronically signed by:  Kristen Best PT

## 2018-08-21 ENCOUNTER — OFFICE VISIT (OUTPATIENT)
Dept: PAIN MANAGEMENT | Age: 68
End: 2018-08-21

## 2018-08-21 VITALS
DIASTOLIC BLOOD PRESSURE: 80 MMHG | SYSTOLIC BLOOD PRESSURE: 133 MMHG | BODY MASS INDEX: 26.19 KG/M2 | WEIGHT: 155 LBS | HEART RATE: 73 BPM

## 2018-08-21 DIAGNOSIS — M79.7 FIBROMYALGIA: ICD-10-CM

## 2018-08-21 DIAGNOSIS — M50.30 DDD (DEGENERATIVE DISC DISEASE), CERVICAL: ICD-10-CM

## 2018-08-21 DIAGNOSIS — G89.4 CHRONIC PAIN SYNDROME: ICD-10-CM

## 2018-08-21 DIAGNOSIS — M48.02 CERVICAL SPINAL STENOSIS: ICD-10-CM

## 2018-08-21 PROCEDURE — G8417 CALC BMI ABV UP PARAM F/U: HCPCS | Performed by: INTERNAL MEDICINE

## 2018-08-21 PROCEDURE — 99213 OFFICE O/P EST LOW 20 MIN: CPT | Performed by: INTERNAL MEDICINE

## 2018-08-21 PROCEDURE — 3017F COLORECTAL CA SCREEN DOC REV: CPT | Performed by: INTERNAL MEDICINE

## 2018-08-21 PROCEDURE — 1090F PRES/ABSN URINE INCON ASSESS: CPT | Performed by: INTERNAL MEDICINE

## 2018-08-21 PROCEDURE — G8399 PT W/DXA RESULTS DOCUMENT: HCPCS | Performed by: INTERNAL MEDICINE

## 2018-08-21 PROCEDURE — 1036F TOBACCO NON-USER: CPT | Performed by: INTERNAL MEDICINE

## 2018-08-21 PROCEDURE — G8427 DOCREV CUR MEDS BY ELIG CLIN: HCPCS | Performed by: INTERNAL MEDICINE

## 2018-08-21 PROCEDURE — 1123F ACP DISCUSS/DSCN MKR DOCD: CPT | Performed by: INTERNAL MEDICINE

## 2018-08-21 PROCEDURE — 1101F PT FALLS ASSESS-DOCD LE1/YR: CPT | Performed by: INTERNAL MEDICINE

## 2018-08-21 PROCEDURE — 4040F PNEUMOC VAC/ADMIN/RCVD: CPT | Performed by: INTERNAL MEDICINE

## 2018-08-21 RX ORDER — HYDROCODONE BITARTRATE AND ACETAMINOPHEN 7.5; 325 MG/1; MG/1
1 TABLET ORAL EVERY 6 HOURS PRN
Qty: 105 TABLET | Refills: 0 | Status: SHIPPED | OUTPATIENT
Start: 2018-08-21 | End: 2018-10-02 | Stop reason: SDUPTHER

## 2018-08-21 RX ORDER — NORTRIPTYLINE HYDROCHLORIDE 25 MG/1
25-50 CAPSULE ORAL NIGHTLY
Qty: 60 CAPSULE | Refills: 1 | Status: SHIPPED | OUTPATIENT
Start: 2018-08-21 | End: 2018-10-02

## 2018-08-21 RX ORDER — GABAPENTIN 300 MG/1
300 CAPSULE ORAL DAILY
Qty: 30 CAPSULE | Refills: 1 | Status: SHIPPED | OUTPATIENT
Start: 2018-08-21 | End: 2018-10-02 | Stop reason: SDUPTHER

## 2018-08-21 NOTE — PROGRESS NOTES
mouth daily as needed       gabapentin (NEURONTIN) 300 MG capsule Take 1 capsule by mouth daily for 30 days. . 30 capsule 1     No facility-administered medications prior to visit. SOCIAL/FAMILY/PAST MEDICAL HISTORY: Ms. Sammi Manrique, family and past medical history was reviewed. REVIEW OF SYSTEMS:    Respiratory: Negative for apnea, chest tightness and shortness of breath or change in baseline breathing. Gastrointestinal: Negative for nausea, vomiting, abdominal pain, diarrhea, constipation, blood in stool and abdominal distention. PHYSICAL EXAM:   Nursing note and vitals reviewed. /80 (Site: Left Arm, Position: Sitting, Cuff Size: Medium Adult)   Pulse 73   Wt 155 lb (70.3 kg)   Breastfeeding? No   BMI 26.19 kg/m²   Constitutional: She appears well-developed and well-nourished. No acute distress. Skin: Skin is warm and dry, good turgor. No rash noted. She is not diaphoretic. Cardiovascular: Normal rate, regular rhythm, normal heart sounds, and does not have murmur. Pulmonary/Chest: Effort normal. No respiratory distress. She does not have wheezes in the lung fields. She has no rales. Neurological/Psychiatric:She is alert and oriented to person, place, and time. Coordination is  normal. Her mood isAppropriate and affect is Neutral/Euthymic(normal) . IMPRESSION:   1. Chronic pain syndrome    2. DDD (degenerative disc disease), cervical    3. Fibromyalgia    4.  Cervical spinal stenosis        PLAN:  Informed verbal consent was obtained  -Continue with current regimen   -ROM exercises as advised   -Continue with Norco 3 per day   -Advise to try increasing Neurontin to 300 mg BID, but does not want to  -Continue with Pamelor    Current Outpatient Prescriptions   Medication Sig Dispense Refill    nortriptyline (PAMELOR) 25 MG capsule Take 1-2 capsules by mouth nightly 60 capsule 1    methocarbamol (ROBAXIN) 750 MG tablet Take by mouth 3 times daily as needed       Zolpidem Tartrate (AMBIEN PO) Take 10 mg by mouth daily as needed       gabapentin (NEURONTIN) 300 MG capsule Take 1 capsule by mouth daily for 30 days. . 30 capsule 1     No current facility-administered medications for this visit. I will continue her current medication regimen  which is part of the above treatment schedule. It has been helping with Ms. Doran's chronic  medical problems which for this visit include: There were no encounter diagnoses. Risks and benefits of the medications and other alternative treatments  including no treatment were discussed with the patient. The common side effects of these medications were also explained to the patient. Informed verbal consent was obtained. Goals of current treatment regimen include improvement in pain, restoration of functioning- with focus on improvement in physical performance, general activity, work or disability,emotional distress, health care utilization and  decreased medication consumption. Will continue to monitor progress towards achieving/maintaining therapeutic goals with special emphasis on  1. Improvement in perceived interfernce  of pain with ADL's. Ability to do home exercises independently. Ability to do household chores indoor and/or outdoor work and social and leisure activities. Improve psychosocial and physical functioning. - she is showing progression towards this treatment goal with the current regimen. She was advised against drinking alcohol with the narcotic pain medicines, advised against driving or handling machinery while adjusting the dose of medicines or if having cognitive  issues related to the current medications. Risk of overdose and death, if medicines not taken as prescribed, were also discussed. If the patient develops new symptoms or if the symptoms worsen, the patient should call the office.     While transcribing every attempt was made to maintain the accuracy of the note in terms of it's contents,there may have been

## 2018-09-01 ENCOUNTER — HOSPITAL ENCOUNTER (OUTPATIENT)
Dept: OTHER | Age: 68
Discharge: HOME | End: 2018-09-01
Attending: ORTHOPAEDIC SURGERY | Admitting: ORTHOPAEDIC SURGERY

## 2018-10-01 ENCOUNTER — HOSPITAL ENCOUNTER (OUTPATIENT)
Dept: PHYSICAL THERAPY | Age: 68
Setting detail: THERAPIES SERIES
Discharge: HOME | End: 2018-10-01
Payer: MEDICARE

## 2018-10-01 ENCOUNTER — HOSPITAL ENCOUNTER (OUTPATIENT)
Dept: MRI IMAGING | Age: 68
Discharge: HOME | End: 2018-10-01
Payer: MEDICARE

## 2018-10-01 DIAGNOSIS — M54.2 CERVICALGIA: ICD-10-CM

## 2018-10-01 PROCEDURE — 97110 THERAPEUTIC EXERCISES: CPT

## 2018-10-01 PROCEDURE — 72141 MRI NECK SPINE W/O DYE: CPT

## 2018-10-01 PROCEDURE — 97140 MANUAL THERAPY 1/> REGIONS: CPT

## 2018-10-01 NOTE — FLOWSHEET NOTE
pool area in which pt was shown family changing rooms, how to utilize lockers what to wear for sessions as well as emphasized treatments take place in group setting. Pt was also educated on process of checking in at  and reporting back to pool area taking care with transition to pool area due to wet floors. Pt was also issued pool handout which outlines process, reiterates cancellation/no show policy as well as further instructions on accessing lockers.       Home Exercise Program:  9/12: Standing hip ext/plank multifidus  9/10: Issued green t-band for lateral walk-outs and Paloff presses  8/7: The following exercises were performed and added to the pt's home program (educated on appropriate frequency, intensity and duration etc.): gluteal sets, pririformis stretch    Manual Treatments:   10/1: MET to correct L5 ERSL, L4 FRSL, LEAH sacral torsion, L SI posterior innominate SI, L5 flexion mobs Grade III, L4 extension mobs Grade III, unilateral L sacral respiration mobs followed with SI counternutation mobs X15'  9/12: MET to correct L5 ERSL, L4 FRSL, LOL and LEAH sacral torsion, L SI posterior innominate SI, R SI anterior innominate SI, L5 flexion mobs Grade III, L inilateral sacral respiration mobs, followed with sacral nutation respiration mobs X18'  9/10: MET to correct L5 FRSL, L4 ERSL, LEAH sacral torsion,  L SI posterior innominate SI X6'  9/4: MET to correct L5 FRSL, L4 ERSL, LEAH sacral torsion,  L SI posterior innominate SI, R SI anterior innominate S   8/27: MET to correct L5 NRLSR/FRSL, L4 ERSL, LOL/LEAH sacral torsion, L SI posterior innominate SI, R SI anterior innominate SI, L5 Flexion mobs Grade III, L Sacral respiration mobs  Followed with sacral counternutation respiration mobs  8/20: R Pubic upslip, MET to correct L2 FRSL, L1 ERSL, L5 FRSL, ROR sacral torsion,   L SI posterior innominate SI, R SI anterior innominate SI8/7: MET to correct R pubic upslip, L5 NRLSR/ERSL, LOL sacral torsion, L SI posterior innominate SI, R SI anterior innominate SI  Modalities:  8/7: US R piriformis 1.0 w/cm X7' 50% pulsed prone  Timed Code Treatment Minutes:  31  Total Treatment Minutes:  31  Treatment/Activity Tolerance:  [x] Patient tolerated treatment well [] Patient limited by fatigue  [] Patient limited by pain  [] Patient limited by other medical complications  [] Other:     Prognosis: [x] Good [] Fair  [] Poor    Patient Requires Follow-up: [x] Yes  [] No    Plan:   [x] Continue per plan of care [] Alter current plan (see comments)  [] Plan of care initiated [] Hold pending MD visit [] Discharge  Plan for Next Session:  PN  Electronically signed by:  Leatha Lopez PT

## 2018-10-02 ENCOUNTER — OFFICE VISIT (OUTPATIENT)
Dept: PAIN MANAGEMENT | Age: 68
End: 2018-10-02
Payer: MEDICARE

## 2018-10-02 VITALS
HEART RATE: 58 BPM | BODY MASS INDEX: 26.53 KG/M2 | WEIGHT: 157 LBS | SYSTOLIC BLOOD PRESSURE: 134 MMHG | DIASTOLIC BLOOD PRESSURE: 78 MMHG

## 2018-10-02 DIAGNOSIS — G89.4 CHRONIC PAIN SYNDROME: ICD-10-CM

## 2018-10-02 DIAGNOSIS — M79.7 FIBROMYALGIA: ICD-10-CM

## 2018-10-02 DIAGNOSIS — M48.02 CERVICAL SPINAL STENOSIS: ICD-10-CM

## 2018-10-02 DIAGNOSIS — M50.30 DDD (DEGENERATIVE DISC DISEASE), CERVICAL: ICD-10-CM

## 2018-10-02 PROCEDURE — 1101F PT FALLS ASSESS-DOCD LE1/YR: CPT | Performed by: INTERNAL MEDICINE

## 2018-10-02 PROCEDURE — 4040F PNEUMOC VAC/ADMIN/RCVD: CPT | Performed by: INTERNAL MEDICINE

## 2018-10-02 PROCEDURE — 99213 OFFICE O/P EST LOW 20 MIN: CPT | Performed by: INTERNAL MEDICINE

## 2018-10-02 PROCEDURE — G8399 PT W/DXA RESULTS DOCUMENT: HCPCS | Performed by: INTERNAL MEDICINE

## 2018-10-02 PROCEDURE — 1123F ACP DISCUSS/DSCN MKR DOCD: CPT | Performed by: INTERNAL MEDICINE

## 2018-10-02 PROCEDURE — G8427 DOCREV CUR MEDS BY ELIG CLIN: HCPCS | Performed by: INTERNAL MEDICINE

## 2018-10-02 PROCEDURE — 1090F PRES/ABSN URINE INCON ASSESS: CPT | Performed by: INTERNAL MEDICINE

## 2018-10-02 PROCEDURE — 3017F COLORECTAL CA SCREEN DOC REV: CPT | Performed by: INTERNAL MEDICINE

## 2018-10-02 PROCEDURE — G8484 FLU IMMUNIZE NO ADMIN: HCPCS | Performed by: INTERNAL MEDICINE

## 2018-10-02 PROCEDURE — G8417 CALC BMI ABV UP PARAM F/U: HCPCS | Performed by: INTERNAL MEDICINE

## 2018-10-02 PROCEDURE — 1036F TOBACCO NON-USER: CPT | Performed by: INTERNAL MEDICINE

## 2018-10-02 RX ORDER — HYDROCODONE BITARTRATE AND ACETAMINOPHEN 7.5; 325 MG/1; MG/1
1 TABLET ORAL EVERY 6 HOURS PRN
Qty: 105 TABLET | Refills: 0 | Status: SHIPPED | OUTPATIENT
Start: 2018-10-02 | End: 2018-11-08 | Stop reason: SDUPTHER

## 2018-10-02 RX ORDER — GABAPENTIN 300 MG/1
300 CAPSULE ORAL 2 TIMES DAILY
Qty: 60 CAPSULE | Refills: 1 | Status: SHIPPED | OUTPATIENT
Start: 2018-10-02 | End: 2018-11-08 | Stop reason: SDUPTHER

## 2018-10-02 NOTE — PROGRESS NOTES
physical functioning. - she is showing progression towards this treatment goal with the current regimen. She was advised against drinking alcohol with the narcotic pain medicines, advised against driving or handling machinery while adjusting the dose of medicines or if having cognitive  issues related to the current medications. Risk of overdose and death, if medicines not taken as prescribed, were also discussed. If the patient develops new symptoms or if the symptoms worsen, the patient should call the office. While transcribing every attempt was made to maintain the accuracy of the note in terms of it's contents,there may have been some errors made inadvertently. Thank you for allowing me to participate in the care of this patient. Rolf Wagner MD.    Cc: Safai Vazquez MD    I, Dion Matrini, am scribing for and in the presence of Dr. Rolf Wagner.    10/02/18  1:07 PM  ANDREINA Wilson, Dr. Rolf Wagner, personally performed the services described in this documentation as scribed by   Dion Martini MA in my presence and it is both accurate and complete

## 2018-10-05 ENCOUNTER — APPOINTMENT (OUTPATIENT)
Dept: PHYSICAL THERAPY | Age: 68
End: 2018-10-05
Payer: MEDICARE

## 2018-10-12 ENCOUNTER — HOSPITAL ENCOUNTER (OUTPATIENT)
Dept: PHYSICAL THERAPY | Age: 68
Setting detail: THERAPIES SERIES
Discharge: HOME | End: 2018-10-12
Payer: MEDICARE

## 2018-10-12 ENCOUNTER — HOSPITAL ENCOUNTER (OUTPATIENT)
Age: 68
Discharge: HOME | End: 2018-10-12
Payer: MEDICARE

## 2018-10-12 ENCOUNTER — HOSPITAL ENCOUNTER (OUTPATIENT)
Dept: GENERAL RADIOLOGY | Age: 68
Discharge: HOME | End: 2018-10-12
Payer: MEDICARE

## 2018-10-12 DIAGNOSIS — M54.2 NECK PAIN: ICD-10-CM

## 2018-10-12 PROCEDURE — 72050 X-RAY EXAM NECK SPINE 4/5VWS: CPT

## 2018-10-12 PROCEDURE — 97035 APP MDLTY 1+ULTRASOUND EA 15: CPT

## 2018-10-12 PROCEDURE — 97140 MANUAL THERAPY 1/> REGIONS: CPT

## 2018-10-12 PROCEDURE — G8979 MOBILITY GOAL STATUS: HCPCS

## 2018-10-12 PROCEDURE — G8978 MOBILITY CURRENT STATUS: HCPCS

## 2018-10-12 ASSESSMENT — PAIN DESCRIPTION - ORIENTATION: ORIENTATION: LEFT

## 2018-10-12 ASSESSMENT — PAIN SCALES - GENERAL: PAINLEVEL_OUTOF10: 3

## 2018-10-12 ASSESSMENT — PAIN DESCRIPTION - PAIN TYPE: TYPE: ACUTE PAIN

## 2018-10-12 ASSESSMENT — PAIN DESCRIPTION - DESCRIPTORS: DESCRIPTORS: ACHING

## 2018-10-12 ASSESSMENT — PAIN DESCRIPTION - PROGRESSION: CLINICAL_PROGRESSION: GRADUALLY IMPROVING

## 2018-10-12 ASSESSMENT — PAIN DESCRIPTION - FREQUENCY: FREQUENCY: CONTINUOUS

## 2018-10-12 ASSESSMENT — PAIN DESCRIPTION - LOCATION: LOCATION: HIP

## 2018-10-19 ENCOUNTER — HOSPITAL ENCOUNTER (OUTPATIENT)
Dept: PHYSICAL THERAPY | Age: 68
Setting detail: THERAPIES SERIES
Discharge: HOME | End: 2018-10-19
Payer: MEDICARE

## 2018-10-19 PROCEDURE — 97035 APP MDLTY 1+ULTRASOUND EA 15: CPT

## 2018-10-19 PROCEDURE — 97140 MANUAL THERAPY 1/> REGIONS: CPT

## 2018-10-19 NOTE — FLOWSHEET NOTE
counternutation respiration mobs  8/20: R Pubic upslip, MET to correct L2 FRSL, L1 ERSL, L5 FRSL, ROR sacral torsion,   L SI posterior innominate SI, R SI anterior innominate SI8/7: MET to correct R pubic upslip, L5 NRLSR/ERSL, LOL sacral torsion, L SI posterior innominate SI, R SI anterior innominate SI  Modalities:  10/19, 10/12: US L   1.0 w/cm X8' 50% pulsed prone  8/7: US R piriformis 1.0 w/cm X7' 50% pulsed R sidelying  Timed Code Treatment Minutes:  28  Total Treatment Minutes:  28  Treatment/Activity Tolerance:  [x] Patient tolerated treatment well [] Patient limited by fatigue  [] Patient limited by pain  [] Patient limited by other medical complications  [] Other:     Prognosis: [x] Good [] Fair  [] Poor    Patient Requires Follow-up: [x] Yes  [] No    Plan:   [x] Continue per plan of care [] Alter current plan (see comments)  [] Plan of care initiated [] Hold pending MD visit [] Discharge  Plan for Next Session: Continue PT; progress with MFR/STM L gluteus medius  Electronically signed by:  Melvi Mccall PT

## 2018-10-24 ENCOUNTER — HOSPITAL ENCOUNTER (OUTPATIENT)
Dept: PHYSICAL THERAPY | Age: 68
Setting detail: THERAPIES SERIES
Discharge: HOME | End: 2018-10-24
Payer: MEDICARE

## 2018-10-24 PROCEDURE — 97140 MANUAL THERAPY 1/> REGIONS: CPT

## 2018-10-24 PROCEDURE — 97035 APP MDLTY 1+ULTRASOUND EA 15: CPT

## 2018-10-26 ENCOUNTER — HOSPITAL ENCOUNTER (OUTPATIENT)
Dept: PHYSICAL THERAPY | Age: 68
Setting detail: THERAPIES SERIES
Discharge: HOME | End: 2018-10-26
Payer: MEDICARE

## 2018-10-26 PROCEDURE — 97110 THERAPEUTIC EXERCISES: CPT

## 2018-10-26 PROCEDURE — 97035 APP MDLTY 1+ULTRASOUND EA 15: CPT

## 2018-10-26 NOTE — FLOWSHEET NOTE
closed   HR/TR 20 Shldr IR/ER    Marches X20 Arm Circles    Squats x20 PNF Diagonals    Hamstring Curls X20 Wall Push Ups    Hip Flexion (SLR) X20 Figure 8's    Hip aBduction (SLR) X20 Bilateral Pull Downs X20 with N   Hip Extension (SLR) X20      Hip aDduction (SLR)      Hip Circles X20 L/R CW/CCW Functional:    Hip IR/Er  Step up forward R   TrA Set   Step up lateral  X   Pelvic Tilts  5\" X20 Step down     Fig 8's   Lunges Forward    LE PNF  Lunges Retro      Lunges Lateral     Balance:   Lower ab curl with noodle     SLS  15\" x2       Tandem Stance 30\" X2 L/R with eyes closed      NBOS eyes open  Seated:     NBOS eyes closed 30\" X2  Ankle pumps     Hand to Opposite Knee  Ankle Circles     Fwd Step ups to SLS  Knee Flex/Ext    Lateral Step ups to SLS  Hip aBd/aDd    Stop/Go Gait   Bicycle       Ankle DF/PF      Ankle Inv/Ev    Stretching:       Gastroc/Soleus 30\" X 2 L/R     Hamstring   Noodle:     Knee Flex Stretch  Leg Press    Noodle Hang at [place]    Hip Flexor  Noodle Hang Deep Water    SKTC  Noodle Bicycle     DKTC       ITB      Quad  Deep Water:    Mid Back   Jog    UT  Jumping Jacks    Post Shoulder  Heel to Toe    Ladder Pull  Hand to Opposite Knee    Pec Stretch  Rocking Horse      [employer] Group Skier          Cervical:   Other:     AROM Flexion      AROM Extension      AROM Side Bending      AROM Rotation      Chin Tucks      Chin Nods        Aquatic Abbreviation Key  B= Belt DB= Dumbells T= Theratube   H= Hydrotone N= Noodles W= Weights   P= Paddles S= Speedo equipment K= Kickboard     Other Therapeutic Activities:  8/7: 8/7/18 Pt was educated on PT POC, Diagnosis, Prognosis, pathomechanics as well as frequency and duration of scheduling future physical therapy appointments.  Time was also taken on this day to answer all patient questions and participation in PT. 8/7/18 Pt was educated on aquatic therapy intervention and POC as well as taken on tour of pool area in which pt was shown family changing

## 2018-10-30 ENCOUNTER — HOSPITAL ENCOUNTER (OUTPATIENT)
Dept: PHYSICAL THERAPY | Age: 68
Setting detail: THERAPIES SERIES
Discharge: HOME | End: 2018-10-30
Payer: MEDICARE

## 2018-10-30 PROCEDURE — G8979 MOBILITY GOAL STATUS: HCPCS

## 2018-10-30 PROCEDURE — 97110 THERAPEUTIC EXERCISES: CPT

## 2018-10-30 PROCEDURE — 97035 APP MDLTY 1+ULTRASOUND EA 15: CPT

## 2018-10-30 PROCEDURE — G8980 MOBILITY D/C STATUS: HCPCS

## 2018-11-08 ENCOUNTER — OFFICE VISIT (OUTPATIENT)
Dept: PAIN MANAGEMENT | Age: 68
End: 2018-11-08
Payer: MEDICARE

## 2018-11-08 VITALS
DIASTOLIC BLOOD PRESSURE: 67 MMHG | BODY MASS INDEX: 26.7 KG/M2 | WEIGHT: 158 LBS | HEART RATE: 65 BPM | SYSTOLIC BLOOD PRESSURE: 133 MMHG

## 2018-11-08 DIAGNOSIS — G89.4 CHRONIC PAIN SYNDROME: ICD-10-CM

## 2018-11-08 DIAGNOSIS — M79.7 FIBROMYALGIA: ICD-10-CM

## 2018-11-08 DIAGNOSIS — M48.02 CERVICAL SPINAL STENOSIS: ICD-10-CM

## 2018-11-08 DIAGNOSIS — M50.30 DDD (DEGENERATIVE DISC DISEASE), CERVICAL: ICD-10-CM

## 2018-11-08 PROCEDURE — 99213 OFFICE O/P EST LOW 20 MIN: CPT | Performed by: INTERNAL MEDICINE

## 2018-11-08 PROCEDURE — G8417 CALC BMI ABV UP PARAM F/U: HCPCS | Performed by: INTERNAL MEDICINE

## 2018-11-08 PROCEDURE — G8399 PT W/DXA RESULTS DOCUMENT: HCPCS | Performed by: INTERNAL MEDICINE

## 2018-11-08 PROCEDURE — 4040F PNEUMOC VAC/ADMIN/RCVD: CPT | Performed by: INTERNAL MEDICINE

## 2018-11-08 PROCEDURE — 3017F COLORECTAL CA SCREEN DOC REV: CPT | Performed by: INTERNAL MEDICINE

## 2018-11-08 PROCEDURE — G8428 CUR MEDS NOT DOCUMENT: HCPCS | Performed by: INTERNAL MEDICINE

## 2018-11-08 PROCEDURE — 1101F PT FALLS ASSESS-DOCD LE1/YR: CPT | Performed by: INTERNAL MEDICINE

## 2018-11-08 PROCEDURE — 1123F ACP DISCUSS/DSCN MKR DOCD: CPT | Performed by: INTERNAL MEDICINE

## 2018-11-08 PROCEDURE — 1090F PRES/ABSN URINE INCON ASSESS: CPT | Performed by: INTERNAL MEDICINE

## 2018-11-08 PROCEDURE — G8484 FLU IMMUNIZE NO ADMIN: HCPCS | Performed by: INTERNAL MEDICINE

## 2018-11-08 PROCEDURE — 1036F TOBACCO NON-USER: CPT | Performed by: INTERNAL MEDICINE

## 2018-11-08 RX ORDER — GABAPENTIN 300 MG/1
300 CAPSULE ORAL 2 TIMES DAILY
Qty: 60 CAPSULE | Refills: 0 | Status: SHIPPED | OUTPATIENT
Start: 2018-11-08 | End: 2018-12-07 | Stop reason: SDUPTHER

## 2018-11-08 RX ORDER — HYDROCODONE BITARTRATE AND ACETAMINOPHEN 7.5; 325 MG/1; MG/1
1 TABLET ORAL EVERY 6 HOURS PRN
Qty: 84 TABLET | Refills: 0 | Status: SHIPPED | OUTPATIENT
Start: 2018-11-08 | End: 2018-12-07 | Stop reason: SDUPTHER

## 2018-11-08 NOTE — PROGRESS NOTES
constipation, blood in stool and abdominal distention. PHYSICAL EXAM:   Nursing note and vitals reviewed. /67   Pulse 65   Wt 158 lb (71.7 kg)   Breastfeeding? No   BMI 26.70 kg/m²   Constitutional: She appears well-developed and well-nourished. No acute distress. Skin: Skin is warm and dry, good turgor. No rash noted. She is not diaphoretic. Cardiovascular: Normal rate, regular rhythm, normal heart sounds, and does not have murmur. Occasional skipped beats  Pulmonary/Chest: Effort normal. No respiratory distress. She does not have wheezes in the lung fields. She has no rales. Neurological/Psychiatric:She is alert and oriented to person, place, and time. Coordination is  normal. Her mood isAppropriate and affect is Neutral/Euthymic(normal) . IMPRESSION:   1. Chronic pain syndrome    2. Fibromyalgia    3. DDD (degenerative disc disease), cervical    4. Cervical spinal stenosis        PLAN:  Informed verbal consent was obtained  -She was advised to increase fluids ( 5-7  glasses of fluid daily), limit caffeine, avoid cheese products, increase dietary fiber, increase activity and exercise as tolerated and relax regularly and enjoy meals   -she was advised proper sleep hygiene-told to avoid:use of caffeine or other stimulants after noon, alcohol use near bedtime, long or frequent naps during the day, erratic sleep schedule, heavy meals near bedtime, vigorous exercise near bedtime and use of electronic devices near bedtime   -Continue with Norco 3 per day   -Neck postural exercises as advised   -Adv Biofeedback, relaxation and meditation techniques. Referral to psychologist for CBT was also discussed with patient    Current Outpatient Prescriptions   Medication Sig Dispense Refill    Zolpidem Tartrate (AMBIEN PO) Take 10 mg by mouth daily as needed       gabapentin (NEURONTIN) 300 MG capsule Take 1 capsule by mouth 2 times daily for 30 days. . 60 capsule 1     No current

## 2018-12-07 ENCOUNTER — OFFICE VISIT (OUTPATIENT)
Dept: PAIN MANAGEMENT | Age: 68
End: 2018-12-07
Payer: MEDICARE

## 2018-12-07 VITALS
WEIGHT: 158 LBS | DIASTOLIC BLOOD PRESSURE: 73 MMHG | HEART RATE: 59 BPM | BODY MASS INDEX: 26.7 KG/M2 | SYSTOLIC BLOOD PRESSURE: 136 MMHG

## 2018-12-07 DIAGNOSIS — G89.4 CHRONIC PAIN SYNDROME: ICD-10-CM

## 2018-12-07 DIAGNOSIS — M50.30 DDD (DEGENERATIVE DISC DISEASE), CERVICAL: ICD-10-CM

## 2018-12-07 DIAGNOSIS — M48.02 CERVICAL SPINAL STENOSIS: ICD-10-CM

## 2018-12-07 DIAGNOSIS — M79.7 FIBROMYALGIA: ICD-10-CM

## 2018-12-07 PROCEDURE — 1036F TOBACCO NON-USER: CPT | Performed by: INTERNAL MEDICINE

## 2018-12-07 PROCEDURE — 1123F ACP DISCUSS/DSCN MKR DOCD: CPT | Performed by: INTERNAL MEDICINE

## 2018-12-07 PROCEDURE — 99213 OFFICE O/P EST LOW 20 MIN: CPT | Performed by: INTERNAL MEDICINE

## 2018-12-07 PROCEDURE — G8427 DOCREV CUR MEDS BY ELIG CLIN: HCPCS | Performed by: INTERNAL MEDICINE

## 2018-12-07 PROCEDURE — G8399 PT W/DXA RESULTS DOCUMENT: HCPCS | Performed by: INTERNAL MEDICINE

## 2018-12-07 PROCEDURE — 4040F PNEUMOC VAC/ADMIN/RCVD: CPT | Performed by: INTERNAL MEDICINE

## 2018-12-07 PROCEDURE — 1090F PRES/ABSN URINE INCON ASSESS: CPT | Performed by: INTERNAL MEDICINE

## 2018-12-07 PROCEDURE — G8484 FLU IMMUNIZE NO ADMIN: HCPCS | Performed by: INTERNAL MEDICINE

## 2018-12-07 PROCEDURE — 3017F COLORECTAL CA SCREEN DOC REV: CPT | Performed by: INTERNAL MEDICINE

## 2018-12-07 PROCEDURE — 1101F PT FALLS ASSESS-DOCD LE1/YR: CPT | Performed by: INTERNAL MEDICINE

## 2018-12-07 PROCEDURE — G8417 CALC BMI ABV UP PARAM F/U: HCPCS | Performed by: INTERNAL MEDICINE

## 2018-12-07 RX ORDER — HYDROCODONE BITARTRATE AND ACETAMINOPHEN 7.5; 325 MG/1; MG/1
1 TABLET ORAL EVERY 6 HOURS PRN
Qty: 84 TABLET | Refills: 0 | Status: SHIPPED | OUTPATIENT
Start: 2018-12-07 | End: 2019-01-04 | Stop reason: SDUPTHER

## 2018-12-07 RX ORDER — GABAPENTIN 300 MG/1
300 CAPSULE ORAL 2 TIMES DAILY
Qty: 60 CAPSULE | Refills: 0 | Status: SHIPPED | OUTPATIENT
Start: 2018-12-07 | End: 2019-01-04 | Stop reason: SDUPTHER

## 2018-12-07 NOTE — PROGRESS NOTES
cognitive  issues related to the current medications. Risk of overdose and death, if medicines not taken as prescribed, were also discussed. If the patient develops new symptoms or if the symptoms worsen, the patient should call the office. While transcribing every attempt was made to maintain the accuracy of the note in terms of it's contents,there may have been some errors made inadvertently. Thank you for allowing me to participate in the care of this patient.     Samson Chaney MD.    Cc: Brittanie Medley MD

## 2019-01-04 ENCOUNTER — OFFICE VISIT (OUTPATIENT)
Dept: PAIN MANAGEMENT | Age: 69
End: 2019-01-04
Payer: MEDICARE

## 2019-01-04 VITALS
HEART RATE: 75 BPM | SYSTOLIC BLOOD PRESSURE: 124 MMHG | WEIGHT: 157 LBS | DIASTOLIC BLOOD PRESSURE: 71 MMHG | BODY MASS INDEX: 26.53 KG/M2

## 2019-01-04 DIAGNOSIS — M79.7 FIBROMYALGIA: ICD-10-CM

## 2019-01-04 DIAGNOSIS — M48.02 CERVICAL SPINAL STENOSIS: ICD-10-CM

## 2019-01-04 DIAGNOSIS — G89.4 CHRONIC PAIN SYNDROME: ICD-10-CM

## 2019-01-04 DIAGNOSIS — M50.30 DDD (DEGENERATIVE DISC DISEASE), CERVICAL: ICD-10-CM

## 2019-01-04 PROCEDURE — 1101F PT FALLS ASSESS-DOCD LE1/YR: CPT | Performed by: INTERNAL MEDICINE

## 2019-01-04 PROCEDURE — G8484 FLU IMMUNIZE NO ADMIN: HCPCS | Performed by: INTERNAL MEDICINE

## 2019-01-04 PROCEDURE — 99213 OFFICE O/P EST LOW 20 MIN: CPT | Performed by: INTERNAL MEDICINE

## 2019-01-04 PROCEDURE — 1123F ACP DISCUSS/DSCN MKR DOCD: CPT | Performed by: INTERNAL MEDICINE

## 2019-01-04 PROCEDURE — 4040F PNEUMOC VAC/ADMIN/RCVD: CPT | Performed by: INTERNAL MEDICINE

## 2019-01-04 PROCEDURE — 3017F COLORECTAL CA SCREEN DOC REV: CPT | Performed by: INTERNAL MEDICINE

## 2019-01-04 PROCEDURE — G8399 PT W/DXA RESULTS DOCUMENT: HCPCS | Performed by: INTERNAL MEDICINE

## 2019-01-04 PROCEDURE — G8417 CALC BMI ABV UP PARAM F/U: HCPCS | Performed by: INTERNAL MEDICINE

## 2019-01-04 PROCEDURE — G8427 DOCREV CUR MEDS BY ELIG CLIN: HCPCS | Performed by: INTERNAL MEDICINE

## 2019-01-04 PROCEDURE — 1036F TOBACCO NON-USER: CPT | Performed by: INTERNAL MEDICINE

## 2019-01-04 PROCEDURE — 1090F PRES/ABSN URINE INCON ASSESS: CPT | Performed by: INTERNAL MEDICINE

## 2019-01-04 RX ORDER — HYDROCODONE BITARTRATE AND ACETAMINOPHEN 7.5; 325 MG/1; MG/1
1 TABLET ORAL EVERY 6 HOURS PRN
Qty: 84 TABLET | Refills: 0 | Status: SHIPPED | OUTPATIENT
Start: 2019-01-04 | End: 2019-01-31 | Stop reason: SDUPTHER

## 2019-01-04 RX ORDER — GABAPENTIN 300 MG/1
600 CAPSULE ORAL EVERY EVENING
Qty: 60 CAPSULE | Refills: 0 | Status: SHIPPED | OUTPATIENT
Start: 2019-01-04 | End: 2019-01-31 | Stop reason: SDUPTHER

## 2019-01-31 ENCOUNTER — OFFICE VISIT (OUTPATIENT)
Dept: PAIN MANAGEMENT | Age: 69
End: 2019-01-31
Payer: MEDICARE

## 2019-01-31 VITALS
HEART RATE: 66 BPM | SYSTOLIC BLOOD PRESSURE: 135 MMHG | BODY MASS INDEX: 26.7 KG/M2 | DIASTOLIC BLOOD PRESSURE: 80 MMHG | WEIGHT: 158 LBS

## 2019-01-31 DIAGNOSIS — M50.30 DDD (DEGENERATIVE DISC DISEASE), CERVICAL: ICD-10-CM

## 2019-01-31 DIAGNOSIS — M79.7 FIBROMYALGIA: ICD-10-CM

## 2019-01-31 DIAGNOSIS — G89.4 CHRONIC PAIN SYNDROME: ICD-10-CM

## 2019-01-31 DIAGNOSIS — M48.02 CERVICAL SPINAL STENOSIS: ICD-10-CM

## 2019-01-31 PROCEDURE — 1123F ACP DISCUSS/DSCN MKR DOCD: CPT | Performed by: INTERNAL MEDICINE

## 2019-01-31 PROCEDURE — G8417 CALC BMI ABV UP PARAM F/U: HCPCS | Performed by: INTERNAL MEDICINE

## 2019-01-31 PROCEDURE — 1036F TOBACCO NON-USER: CPT | Performed by: INTERNAL MEDICINE

## 2019-01-31 PROCEDURE — 1101F PT FALLS ASSESS-DOCD LE1/YR: CPT | Performed by: INTERNAL MEDICINE

## 2019-01-31 PROCEDURE — 4040F PNEUMOC VAC/ADMIN/RCVD: CPT | Performed by: INTERNAL MEDICINE

## 2019-01-31 PROCEDURE — 1090F PRES/ABSN URINE INCON ASSESS: CPT | Performed by: INTERNAL MEDICINE

## 2019-01-31 PROCEDURE — 99213 OFFICE O/P EST LOW 20 MIN: CPT | Performed by: INTERNAL MEDICINE

## 2019-01-31 PROCEDURE — 3017F COLORECTAL CA SCREEN DOC REV: CPT | Performed by: INTERNAL MEDICINE

## 2019-01-31 PROCEDURE — G8427 DOCREV CUR MEDS BY ELIG CLIN: HCPCS | Performed by: INTERNAL MEDICINE

## 2019-01-31 PROCEDURE — G8484 FLU IMMUNIZE NO ADMIN: HCPCS | Performed by: INTERNAL MEDICINE

## 2019-01-31 PROCEDURE — G8399 PT W/DXA RESULTS DOCUMENT: HCPCS | Performed by: INTERNAL MEDICINE

## 2019-01-31 RX ORDER — HYDROCODONE BITARTRATE AND ACETAMINOPHEN 7.5; 325 MG/1; MG/1
1 TABLET ORAL EVERY 6 HOURS PRN
Qty: 84 TABLET | Refills: 0 | Status: SHIPPED | OUTPATIENT
Start: 2019-01-31 | End: 2019-02-28 | Stop reason: SDUPTHER

## 2019-01-31 RX ORDER — GABAPENTIN 300 MG/1
300 CAPSULE ORAL 3 TIMES DAILY
Qty: 90 CAPSULE | Refills: 0 | Status: SHIPPED | OUTPATIENT
Start: 2019-01-31 | End: 2019-02-28 | Stop reason: SDUPTHER

## 2019-02-28 ENCOUNTER — OFFICE VISIT (OUTPATIENT)
Dept: PAIN MANAGEMENT | Age: 69
End: 2019-02-28
Payer: MEDICARE

## 2019-02-28 VITALS
WEIGHT: 158 LBS | HEIGHT: 65 IN | SYSTOLIC BLOOD PRESSURE: 115 MMHG | BODY MASS INDEX: 26.33 KG/M2 | HEART RATE: 70 BPM | DIASTOLIC BLOOD PRESSURE: 73 MMHG

## 2019-02-28 DIAGNOSIS — G89.4 CHRONIC PAIN SYNDROME: ICD-10-CM

## 2019-02-28 DIAGNOSIS — M79.7 FIBROMYALGIA: ICD-10-CM

## 2019-02-28 DIAGNOSIS — M50.30 DDD (DEGENERATIVE DISC DISEASE), CERVICAL: ICD-10-CM

## 2019-02-28 DIAGNOSIS — M48.02 CERVICAL SPINAL STENOSIS: ICD-10-CM

## 2019-02-28 PROCEDURE — G8417 CALC BMI ABV UP PARAM F/U: HCPCS | Performed by: INTERNAL MEDICINE

## 2019-02-28 PROCEDURE — G8427 DOCREV CUR MEDS BY ELIG CLIN: HCPCS | Performed by: INTERNAL MEDICINE

## 2019-02-28 PROCEDURE — 3017F COLORECTAL CA SCREEN DOC REV: CPT | Performed by: INTERNAL MEDICINE

## 2019-02-28 PROCEDURE — 1123F ACP DISCUSS/DSCN MKR DOCD: CPT | Performed by: INTERNAL MEDICINE

## 2019-02-28 PROCEDURE — G8484 FLU IMMUNIZE NO ADMIN: HCPCS | Performed by: INTERNAL MEDICINE

## 2019-02-28 PROCEDURE — G8399 PT W/DXA RESULTS DOCUMENT: HCPCS | Performed by: INTERNAL MEDICINE

## 2019-02-28 PROCEDURE — 99213 OFFICE O/P EST LOW 20 MIN: CPT | Performed by: INTERNAL MEDICINE

## 2019-02-28 PROCEDURE — 1036F TOBACCO NON-USER: CPT | Performed by: INTERNAL MEDICINE

## 2019-02-28 PROCEDURE — 1090F PRES/ABSN URINE INCON ASSESS: CPT | Performed by: INTERNAL MEDICINE

## 2019-02-28 PROCEDURE — 1101F PT FALLS ASSESS-DOCD LE1/YR: CPT | Performed by: INTERNAL MEDICINE

## 2019-02-28 PROCEDURE — 4040F PNEUMOC VAC/ADMIN/RCVD: CPT | Performed by: INTERNAL MEDICINE

## 2019-02-28 RX ORDER — GABAPENTIN 300 MG/1
300 CAPSULE ORAL 3 TIMES DAILY
Qty: 90 CAPSULE | Refills: 0 | Status: SHIPPED | OUTPATIENT
Start: 2019-02-28 | End: 2019-03-25 | Stop reason: SDUPTHER

## 2019-02-28 RX ORDER — HYDROCODONE BITARTRATE AND ACETAMINOPHEN 7.5; 325 MG/1; MG/1
1 TABLET ORAL EVERY 6 HOURS PRN
Qty: 84 TABLET | Refills: 0 | Status: SHIPPED | OUTPATIENT
Start: 2019-02-28 | End: 2019-03-25 | Stop reason: SDUPTHER

## 2019-03-25 ENCOUNTER — OFFICE VISIT (OUTPATIENT)
Dept: PAIN MANAGEMENT | Age: 69
End: 2019-03-25
Payer: MEDICARE

## 2019-03-25 VITALS
HEART RATE: 77 BPM | SYSTOLIC BLOOD PRESSURE: 118 MMHG | DIASTOLIC BLOOD PRESSURE: 77 MMHG | BODY MASS INDEX: 26.7 KG/M2 | WEIGHT: 158 LBS

## 2019-03-25 DIAGNOSIS — M48.02 CERVICAL SPINAL STENOSIS: ICD-10-CM

## 2019-03-25 DIAGNOSIS — G89.4 CHRONIC PAIN SYNDROME: ICD-10-CM

## 2019-03-25 DIAGNOSIS — M79.7 FIBROMYALGIA: ICD-10-CM

## 2019-03-25 DIAGNOSIS — M50.30 DDD (DEGENERATIVE DISC DISEASE), CERVICAL: ICD-10-CM

## 2019-03-25 PROCEDURE — 3017F COLORECTAL CA SCREEN DOC REV: CPT | Performed by: INTERNAL MEDICINE

## 2019-03-25 PROCEDURE — 1036F TOBACCO NON-USER: CPT | Performed by: INTERNAL MEDICINE

## 2019-03-25 PROCEDURE — 4040F PNEUMOC VAC/ADMIN/RCVD: CPT | Performed by: INTERNAL MEDICINE

## 2019-03-25 PROCEDURE — 99213 OFFICE O/P EST LOW 20 MIN: CPT | Performed by: INTERNAL MEDICINE

## 2019-03-25 PROCEDURE — G8399 PT W/DXA RESULTS DOCUMENT: HCPCS | Performed by: INTERNAL MEDICINE

## 2019-03-25 PROCEDURE — G8484 FLU IMMUNIZE NO ADMIN: HCPCS | Performed by: INTERNAL MEDICINE

## 2019-03-25 PROCEDURE — 1123F ACP DISCUSS/DSCN MKR DOCD: CPT | Performed by: INTERNAL MEDICINE

## 2019-03-25 PROCEDURE — 1101F PT FALLS ASSESS-DOCD LE1/YR: CPT | Performed by: INTERNAL MEDICINE

## 2019-03-25 PROCEDURE — 1090F PRES/ABSN URINE INCON ASSESS: CPT | Performed by: INTERNAL MEDICINE

## 2019-03-25 PROCEDURE — G8427 DOCREV CUR MEDS BY ELIG CLIN: HCPCS | Performed by: INTERNAL MEDICINE

## 2019-03-25 PROCEDURE — G8417 CALC BMI ABV UP PARAM F/U: HCPCS | Performed by: INTERNAL MEDICINE

## 2019-03-25 RX ORDER — HYDROCODONE BITARTRATE AND ACETAMINOPHEN 7.5; 325 MG/1; MG/1
1 TABLET ORAL EVERY 6 HOURS PRN
Qty: 84 TABLET | Refills: 0 | Status: SHIPPED | OUTPATIENT
Start: 2019-03-25 | End: 2019-04-25 | Stop reason: SDUPTHER

## 2019-03-25 RX ORDER — GABAPENTIN 300 MG/1
300 CAPSULE ORAL 3 TIMES DAILY
Qty: 90 CAPSULE | Refills: 1 | Status: SHIPPED | OUTPATIENT
Start: 2019-03-25 | End: 2019-04-25 | Stop reason: SDUPTHER

## 2019-04-25 ENCOUNTER — OFFICE VISIT (OUTPATIENT)
Dept: PAIN MANAGEMENT | Age: 69
End: 2019-04-25
Payer: MEDICARE

## 2019-04-25 VITALS
HEART RATE: 70 BPM | SYSTOLIC BLOOD PRESSURE: 126 MMHG | WEIGHT: 158 LBS | DIASTOLIC BLOOD PRESSURE: 79 MMHG | BODY MASS INDEX: 26.7 KG/M2

## 2019-04-25 DIAGNOSIS — G89.4 CHRONIC PAIN SYNDROME: ICD-10-CM

## 2019-04-25 DIAGNOSIS — M50.30 DDD (DEGENERATIVE DISC DISEASE), CERVICAL: ICD-10-CM

## 2019-04-25 DIAGNOSIS — M48.02 CERVICAL SPINAL STENOSIS: ICD-10-CM

## 2019-04-25 DIAGNOSIS — M79.7 FIBROMYALGIA: ICD-10-CM

## 2019-04-25 PROCEDURE — G8417 CALC BMI ABV UP PARAM F/U: HCPCS | Performed by: INTERNAL MEDICINE

## 2019-04-25 PROCEDURE — 1036F TOBACCO NON-USER: CPT | Performed by: INTERNAL MEDICINE

## 2019-04-25 PROCEDURE — 4040F PNEUMOC VAC/ADMIN/RCVD: CPT | Performed by: INTERNAL MEDICINE

## 2019-04-25 PROCEDURE — 1123F ACP DISCUSS/DSCN MKR DOCD: CPT | Performed by: INTERNAL MEDICINE

## 2019-04-25 PROCEDURE — 3017F COLORECTAL CA SCREEN DOC REV: CPT | Performed by: INTERNAL MEDICINE

## 2019-04-25 PROCEDURE — 99213 OFFICE O/P EST LOW 20 MIN: CPT | Performed by: INTERNAL MEDICINE

## 2019-04-25 PROCEDURE — G8427 DOCREV CUR MEDS BY ELIG CLIN: HCPCS | Performed by: INTERNAL MEDICINE

## 2019-04-25 PROCEDURE — 1090F PRES/ABSN URINE INCON ASSESS: CPT | Performed by: INTERNAL MEDICINE

## 2019-04-25 PROCEDURE — G8399 PT W/DXA RESULTS DOCUMENT: HCPCS | Performed by: INTERNAL MEDICINE

## 2019-04-25 RX ORDER — HYDROCODONE BITARTRATE AND ACETAMINOPHEN 7.5; 325 MG/1; MG/1
1 TABLET ORAL EVERY 6 HOURS PRN
Qty: 84 TABLET | Refills: 0 | Status: SHIPPED | OUTPATIENT
Start: 2019-04-25 | End: 2019-05-23 | Stop reason: SDUPTHER

## 2019-04-25 RX ORDER — GABAPENTIN 300 MG/1
300 CAPSULE ORAL 3 TIMES DAILY
Qty: 90 CAPSULE | Refills: 1 | Status: SHIPPED | OUTPATIENT
Start: 2019-04-25 | End: 2019-05-23 | Stop reason: SDUPTHER

## 2019-04-25 NOTE — PROGRESS NOTES
Cuong Malloy  1950  [id number]    HISTORY OF PRESENT ILLNESS:  Ms. Kaia Keller is a 80 y.o. female returns for a follow up visit for multiple medical problems. Her current presenting problems are   1. Chronic pain syndrome    2. DDD (degenerative disc disease), cervical    3. Fibromyalgia    4. Cervical spinal stenosis    . As per information/history obtained from the PADT(patient assessment and documentation tool) - She complains of pain in the neck, upper back, mid back and lower back with radiation to the shoulders Bilateral and hands Bilateral She rates the pain 3/10 and describes it as sharp, aching, burning. Pain is made worse by: movement, walking, standing, sitting, bending, lifting. Current treatment regimen has helped relieve about 70% of the pain. She denies side effects from the current pain regimen. Patient reports that since the last follow up visit the physical functioning is unchanged, family/social relationships are unchanged, mood is unchanged and sleep patterns are unchanged, and that the overall functioning is unchanged. Patient denies neurological bowel or bladder. Patient denies misusing/abusing her narcotic pain medications or using any illegal drugs. There are No indicators for possible drug abuse, addiction or diversion problems. Upon obtaining the medical history from Ms. Kaia Keller regarding today's office visit for her presenting problems, Patient states she has been doing somewhat better. She says she is using Neurontin along with Norco. She mentions she is using Robaxin as needed from PCP. She reports she is managing her ADls.  complains her back and shoulder on the left side hurts the most.       ALLERGIES: Patients list of allergies were reviewed     MEDICATIONS: Ms. Kaia Keller list of medications were reviewed. Her current medications are   Outpatient Medications Prior to Visit   Medication Sig Dispense Refill    Zolpidem Tartrate (AMBIEN PO) Take 10 mg by mouth daily as needed       gabapentin (NEURONTIN) 300 MG capsule Take 1 capsule by mouth 3 times daily for 30 days. 90 capsule 1     No facility-administered medications prior to visit. SOCIAL/FAMILY/PAST MEDICAL HISTORY: Ms. Rohini Joyce, family and past medical history was reviewed. REVIEW OF SYSTEMS:    Respiratory: Negative for apnea, chest tightness and shortness of breath or change in baseline breathing. Gastrointestinal: Negative for nausea, vomiting, abdominal pain, diarrhea, constipation, blood in stool and abdominal distention. PHYSICAL EXAM:   Nursing note and vitals reviewed. /79   Pulse 70   Wt 158 lb (71.7 kg)   BMI 26.70 kg/m²   Constitutional: She appears well-developed and well-nourished. No acute distress. Skin: Skin is warm and dry, good turgor. No rash noted. She is not diaphoretic. Cardiovascular: Normal rate, regular rhythm, normal heart sounds, and does not have murmur. Pulmonary/Chest: Effort normal. No respiratory distress. She does not have wheezes in the lung fields. She has no rales. Neurological/Psychiatric:She is alert and oriented to person, place, and time. Coordination is  normal.  Her mood isAppropriate and affect is Neutral/Euthymic(normal) . IMPRESSION:   1. Chronic pain syndrome    2. DDD (degenerative disc disease), cervical    3. Fibromyalgia    4.  Cervical spinal stenosis        PLAN:  Informed verbal consent was obtained  -Continue with Norco 3 per day with Neurontin   -she was advised proper sleep hygiene-told to avoid:use of caffeine or other stimulants after noon, alcohol use near bedtime, long or frequent naps during the day, erratic sleep schedule, heavy meals near bedtime, vigorous exercise near bedtime and use of electronic devices near bedtime   -Neck postural exercises as advised   Current Outpatient Medications   Medication Sig Dispense Refill    Zolpidem Tartrate (AMBIEN PO) Take 10 mg by mouth daily as needed       gabapentin (NEURONTIN) 300 MG capsule Take 1 capsule by mouth 3 times daily for 30 days. 90 capsule 1     No current facility-administered medications for this visit. I will continue her current medication regimen  which is part of the above treatment schedule. It has been helping with Ms. Doran's chronic  medical problems which for this visit include:   Diagnoses of Chronic pain syndrome, DDD (degenerative disc disease), cervical, Fibromyalgia, and Cervical spinal stenosis were pertinent to this visit. Risks and benefits of the medications and other alternative treatments  including no treatment were discussed with the patient. The common side effects of these medications were also explained to the patient. Informed verbal consent was obtained. Goals of current treatment regimen include improvement in pain, restoration of functioning- with focus on improvement in physical performance, general activity, work or disability,emotional distress, health care utilization and  decreased medication consumption. Will continue to monitor progress towards achieving/maintaining therapeutic goals with special emphasis on  1. Improvement in perceived interfernce  of pain with ADL's. Ability to do home exercises independently. Ability to do household chores indoor and/or outdoor work and social and leisure activities. Improve psychosocial and physical functioning. - she is showing progression towards this treatment goal with the current regimen. She was advised against drinking alcohol with the narcotic pain medicines, advised against driving or handling machinery while adjusting the dose of medicines or if having cognitive  issues related to the current medications. Risk of overdose and death, if medicines not taken as prescribed, were also discussed. If the patient develops new symptoms or if the symptoms worsen, the patient should call the office.     While transcribing every attempt was made to maintain the accuracy of the note in terms of it's contents,there may have been some errors made inadvertently. Thank you for allowing me to participate in the care of this patient.     Shawn Blancas MD.    Cc: Maria Isabel Kenney MD    I, Jonny Greene, am scribing for and in the presence of Dr. Shawn Blancas.   04/25/19  2:16 PM  Jonny Greene MA     I, Dr. Shawn Blancas, personally performed the services described in this documentation as scribed by   Jonny Greene MA in my presence and it is both accurate and complete

## 2019-05-23 ENCOUNTER — OFFICE VISIT (OUTPATIENT)
Dept: PAIN MANAGEMENT | Age: 69
End: 2019-05-23
Payer: MEDICARE

## 2019-05-23 VITALS
DIASTOLIC BLOOD PRESSURE: 72 MMHG | HEART RATE: 68 BPM | WEIGHT: 157 LBS | SYSTOLIC BLOOD PRESSURE: 129 MMHG | BODY MASS INDEX: 26.53 KG/M2

## 2019-05-23 DIAGNOSIS — M79.7 FIBROMYALGIA: ICD-10-CM

## 2019-05-23 DIAGNOSIS — M50.30 DDD (DEGENERATIVE DISC DISEASE), CERVICAL: ICD-10-CM

## 2019-05-23 DIAGNOSIS — M48.02 CERVICAL SPINAL STENOSIS: ICD-10-CM

## 2019-05-23 DIAGNOSIS — G89.4 CHRONIC PAIN SYNDROME: ICD-10-CM

## 2019-05-23 PROCEDURE — 1090F PRES/ABSN URINE INCON ASSESS: CPT | Performed by: INTERNAL MEDICINE

## 2019-05-23 PROCEDURE — 1123F ACP DISCUSS/DSCN MKR DOCD: CPT | Performed by: INTERNAL MEDICINE

## 2019-05-23 PROCEDURE — 99213 OFFICE O/P EST LOW 20 MIN: CPT | Performed by: INTERNAL MEDICINE

## 2019-05-23 PROCEDURE — G8417 CALC BMI ABV UP PARAM F/U: HCPCS | Performed by: INTERNAL MEDICINE

## 2019-05-23 PROCEDURE — 4040F PNEUMOC VAC/ADMIN/RCVD: CPT | Performed by: INTERNAL MEDICINE

## 2019-05-23 PROCEDURE — G8427 DOCREV CUR MEDS BY ELIG CLIN: HCPCS | Performed by: INTERNAL MEDICINE

## 2019-05-23 PROCEDURE — 3017F COLORECTAL CA SCREEN DOC REV: CPT | Performed by: INTERNAL MEDICINE

## 2019-05-23 PROCEDURE — 1036F TOBACCO NON-USER: CPT | Performed by: INTERNAL MEDICINE

## 2019-05-23 PROCEDURE — G8399 PT W/DXA RESULTS DOCUMENT: HCPCS | Performed by: INTERNAL MEDICINE

## 2019-05-23 RX ORDER — GABAPENTIN 300 MG/1
300 CAPSULE ORAL 3 TIMES DAILY
Qty: 90 CAPSULE | Refills: 1 | Status: SHIPPED | OUTPATIENT
Start: 2019-05-23 | End: 2019-06-20 | Stop reason: SDUPTHER

## 2019-05-23 RX ORDER — HYDROCODONE BITARTRATE AND ACETAMINOPHEN 7.5; 325 MG/1; MG/1
1 TABLET ORAL EVERY 6 HOURS PRN
Qty: 84 TABLET | Refills: 0 | Status: SHIPPED | OUTPATIENT
Start: 2019-05-23 | End: 2019-06-20 | Stop reason: SDUPTHER

## 2019-05-24 NOTE — PROGRESS NOTES
Kathie [facility]  1950  [id number]      HISTORY OF PRESENT ILLNESS:  Ms. Komal Dee is a 80 y.o. female returns for a follow up visit for pain management  She has a diagnosis of   1. Chronic pain syndrome    2. DDD (degenerative disc disease), cervical    3. Fibromyalgia    4. Cervical spinal stenosis    . She complains of pain in the neck, left shoulder, upper/mid/lower back, buttock and bilateral hip She rates the pain 6/10 and describes it as aching, throbbing, pins and needles. Current treatment regimen has helped relieve about 70% of the pain. She denies any side effects from the current pain regimen. Patient reports that since the last follow up visit the physical functioning is unchanged, family/social relationships are unchanged, mood is unchanged sleep patterns are unchanged, and that the overall functioning is unchanged. Patient denies misusing/abusing her narcotic pain medications or using any illegal drugs. There are No indicators for possible drug abuse, addiction or diversion problems. Ms. Komal Dee states she is having increase pain in the shoulder and neck, she says she is working around in the yard. She mentions she is using OTC NSAID's along with Norco and Neurontin 900 mg. Patient denies any constipation symptoms. She reports she is managing her house chores. ALLERGIES: Patients list of allergies were reviewed     MEDICATIONS: Ms. Komal Dee list of medications were reviewed. Her current medications are   Outpatient Medications Prior to Visit   Medication Sig Dispense Refill    Zolpidem Tartrate (AMBIEN PO) Take 10 mg by mouth daily as needed       gabapentin (NEURONTIN) 300 MG capsule Take 1 capsule by mouth 3 times daily for 30 days. 90 capsule 1    HYDROcodone-acetaminophen (NORCO) 7.5-325 MG per tablet Take 1 tablet by mouth every 6 hours as needed for Pain (max 3/day) for up to 28 days. 84 tablet 0     No facility-administered medications prior to visit.         SOCIAL/FAMILY/PAST MEDICAL HISTORY: Ms. Nancy Xiao, family and past medical history was reviewed. REVIEW OF SYSTEMS:    Respiratory: Negative for apnea, chest tightness and shortness of breath or change in baseline breathing. Gastrointestinal: Negative for nausea, vomiting, abdominal pain, diarrhea, constipation, blood in stool and abdominal distention. PHYSICAL EXAM:   Nursing note and vitals reviewed. /72   Pulse 68   Wt 157 lb (71.2 kg)   BMI 26.53 kg/m²   Constitutional: She appears well-developed and well-nourished. No acute distress. Skin: Skin is warm and dry, good turgor. No rash noted. She is not diaphoretic. Cardiovascular: Normal rate, regular rhythm, normal heart sounds, and does not have murmur. Pulmonary/Chest: Effort normal. No respiratory distress. She does not have wheezes in the lung fields. She has no rales. Neurological/Psychiatric:She is alert and oriented to person, place, and time. Coordination is  normal.  Her mood isAppropriate and affect is Neutral/Euthymic(normal) . IMPRESSION:   1. Chronic pain syndrome    2. DDD (degenerative disc disease), cervical    3. Fibromyalgia    4. Cervical spinal stenosis        PLAN:  Informed verbal consent was obtained  -continue with current opioid regimen   -Adv Biofeedback, relaxation and meditation techniques. Referral to psychologist for CBT was also discussed with patient  -back stretching exercises were given   -She was advised to increase fluids ( 5-7  glasses of fluid daily), limit caffeine, avoid cheese products, increase dietary fiber, increase activity and exercise as tolerated and relax regularly and enjoy meals   -Urine drug screen with GC/MS for opiates and drugs of abuse was ordered and will follow up on results. -Most recent labs were reviewed and are within normal limits.  Will repeat them within next 9-12 months if there is no status change      Current Outpatient Medications   Medication Sig Dispense Refill    gabapentin (NEURONTIN) 300 MG capsule Take 1 capsule by mouth 3 times daily for 30 days. 90 capsule 1    HYDROcodone-acetaminophen (NORCO) 7.5-325 MG per tablet Take 1 tablet by mouth every 6 hours as needed for Pain (max 3/day) for up to 28 days. 84 tablet 0    Zolpidem Tartrate (AMBIEN PO) Take 10 mg by mouth daily as needed        No current facility-administered medications for this visit. I will continue her current medication regimen  which is part of the above treatment schedule. It has been helping with Ms. Doran's chronic  medical problems which for this visit include:   Diagnoses of Chronic pain syndrome, DDD (degenerative disc disease), cervical, Fibromyalgia, and Cervical spinal stenosis were pertinent to this visit. Risks and benefits of the medications and other alternative treatments  including no treatment were discussed with the patient. The common side effects of these medications were also explained to the patient. Informed verbal consent was obtained. Goals of current treatment regimen include improvement in pain, restoration of functioning- with focus on improvement in physical performance, general activity, work or disability,emotional distress, health care utilization and  decreased medication consumption. Will continue to monitor progress towards achieving/maintaining therapeutic goals with special emphasis on  1. Improvement in perceived interfernce  of pain with ADL's. Ability to do home exercises independently. Ability to do household chores indoor and/or outdoor work and social and leisure activities. Improve psychosocial and physical functioning. - she is showing progression towards this treatment goal with the current regimen. She was advised against drinking alcohol with the narcotic pain medicines, advised against driving or handling machinery while adjusting the dose of medicines or if having cognitive  issues related to the current medications. Risk of overdose and death, if medicines not taken as prescribed, were also discussed. If the patient develops new symptoms or if the symptoms worsen, the patient should call the office. While transcribing every attempt was made to maintain the accuracy of the note in terms of it's contents,there may have been some errors made inadvertently. Thank you for allowing me to participate in the care of this patient.     Saida Chakraborty MD.    Cc: Maxwell Mary MD

## 2019-06-20 ENCOUNTER — OFFICE VISIT (OUTPATIENT)
Dept: PAIN MANAGEMENT | Age: 69
End: 2019-06-20
Payer: MEDICARE

## 2019-06-20 VITALS
WEIGHT: 158 LBS | DIASTOLIC BLOOD PRESSURE: 69 MMHG | SYSTOLIC BLOOD PRESSURE: 110 MMHG | BODY MASS INDEX: 26.7 KG/M2 | HEART RATE: 76 BPM

## 2019-06-20 DIAGNOSIS — G89.4 CHRONIC PAIN SYNDROME: ICD-10-CM

## 2019-06-20 DIAGNOSIS — M50.30 DDD (DEGENERATIVE DISC DISEASE), CERVICAL: ICD-10-CM

## 2019-06-20 DIAGNOSIS — M48.02 CERVICAL SPINAL STENOSIS: ICD-10-CM

## 2019-06-20 DIAGNOSIS — M79.7 FIBROMYALGIA: ICD-10-CM

## 2019-06-20 PROCEDURE — 1036F TOBACCO NON-USER: CPT | Performed by: INTERNAL MEDICINE

## 2019-06-20 PROCEDURE — G8427 DOCREV CUR MEDS BY ELIG CLIN: HCPCS | Performed by: INTERNAL MEDICINE

## 2019-06-20 PROCEDURE — G8417 CALC BMI ABV UP PARAM F/U: HCPCS | Performed by: INTERNAL MEDICINE

## 2019-06-20 PROCEDURE — 1123F ACP DISCUSS/DSCN MKR DOCD: CPT | Performed by: INTERNAL MEDICINE

## 2019-06-20 PROCEDURE — 1090F PRES/ABSN URINE INCON ASSESS: CPT | Performed by: INTERNAL MEDICINE

## 2019-06-20 PROCEDURE — G8399 PT W/DXA RESULTS DOCUMENT: HCPCS | Performed by: INTERNAL MEDICINE

## 2019-06-20 PROCEDURE — 3017F COLORECTAL CA SCREEN DOC REV: CPT | Performed by: INTERNAL MEDICINE

## 2019-06-20 PROCEDURE — 99213 OFFICE O/P EST LOW 20 MIN: CPT | Performed by: INTERNAL MEDICINE

## 2019-06-20 PROCEDURE — 4040F PNEUMOC VAC/ADMIN/RCVD: CPT | Performed by: INTERNAL MEDICINE

## 2019-06-20 RX ORDER — HYDROCODONE BITARTRATE AND ACETAMINOPHEN 7.5; 325 MG/1; MG/1
1 TABLET ORAL EVERY 6 HOURS PRN
Qty: 120 TABLET | Refills: 0 | Status: SHIPPED | OUTPATIENT
Start: 2019-06-20 | End: 2019-08-01 | Stop reason: SDUPTHER

## 2019-06-20 RX ORDER — GABAPENTIN 300 MG/1
300 CAPSULE ORAL 3 TIMES DAILY
Qty: 90 CAPSULE | Refills: 1 | Status: SHIPPED | OUTPATIENT
Start: 2019-06-20 | End: 2019-08-01 | Stop reason: SDUPTHER

## 2019-06-20 NOTE — PROGRESS NOTES
Nik Chente  1950  [id number]    HISTORY OF PRESENT ILLNESS:  Ms. Buren Mcardle is a 80 y.o. female returns for a follow up visit for multiple medical problems. Her current presenting problems are   1. Chronic pain syndrome    2. DDD (degenerative disc disease), cervical    3. Fibromyalgia    4. Cervical spinal stenosis    . As per information/history obtained from the PADT(patient assessment and documentation tool) - She complains of pain in the head, neck, shoulders Left, upper back, mid back and lower back with radiation to the arms Left, buttocks and hips Bilateral She rates the pain 3/10 and describes it as aching, burning, numbness, pins and needles. Pain is made worse by: nothing, movement. Current treatment regimen has helped relieve about 70% of the pain. She denies side effects from the current pain regimen. Patient reports that since the last follow up visit the physical functioning is unchanged, family/social relationships are unchanged, mood is unchanged and sleep patterns are better, and that the overall functioning is better. Patient denies neurological bowel or bladder. Patient denies misusing/abusing her narcotic pain medications or using any illegal drugs. There are No indicators for possible drug abuse, addiction or diversion problems. Upon obtaining the medical history from Ms. Buren Mcardle regarding today's office visit for her presenting problems, Patient reports that the pain is fairly well tolerated with the current regimen has had some exacerbations, but overall has been tolerable. Ms. Buren Mcardle states that  she has been staying with her exercise routine and working indoors and/or outdoors as tolerated, performing household chores. She says she is managing ok with the regimen. She mentions she is using Norco 3 per day. Patient denies any constipation symptoms. She says she is managing her house activities.       ALLERGIES: Patients list of allergies were reviewed     MEDICATIONS: Ms. Buren Mcardle list of medications were reviewed. Her current medications are   Outpatient Medications Prior to Visit   Medication Sig Dispense Refill    gabapentin (NEURONTIN) 300 MG capsule Take 1 capsule by mouth 3 times daily for 30 days. 90 capsule 1    HYDROcodone-acetaminophen (NORCO) 7.5-325 MG per tablet Take 1 tablet by mouth every 6 hours as needed for Pain (max 3/day) for up to 28 days. 84 tablet 0    Zolpidem Tartrate (AMBIEN PO) Take 10 mg by mouth daily as needed        No facility-administered medications prior to visit. SOCIAL/FAMILY/PAST MEDICAL HISTORY: Ms. Ariela Stephenson, family and past medical history was reviewed. REVIEW OF SYSTEMS:    Respiratory: Negative for apnea, chest tightness and shortness of breath or change in baseline breathing. Gastrointestinal: Negative for nausea, vomiting, abdominal pain, diarrhea, constipation, blood in stool and abdominal distention. PHYSICAL EXAM:   Nursing note and vitals reviewed. /69   Pulse 76   Wt 158 lb (71.7 kg)   BMI 26.70 kg/m²   Constitutional: She appears well-developed and well-nourished. No acute distress. Skin: Skin is warm and dry, good turgor. No rash noted. She is not diaphoretic. Cardiovascular: Normal rate, regular rhythm, normal heart sounds, and does not have murmur. Pulmonary/Chest: Effort normal. No respiratory distress. She does not have wheezes in the lung fields. She has no rales. Neurological/Psychiatric:She is alert and oriented to person, place, and time. Coordination is  normal.  Her mood isAppropriate and affect is Neutral/Euthymic(normal) . IMPRESSION:   1. Chronic pain syndrome    2. DDD (degenerative disc disease), cervical    3. Fibromyalgia    4. Cervical spinal stenosis        PLAN:  Informed verbal consent was obtained  -continue with current opioid regimen   -Adv Biofeedback, relaxation and meditation techniques.  Referral to psychologist for CBT was also discussed with patient  -continue with

## 2019-08-01 ENCOUNTER — OFFICE VISIT (OUTPATIENT)
Dept: PAIN MANAGEMENT | Age: 69
End: 2019-08-01
Payer: MEDICARE

## 2019-08-01 VITALS
HEART RATE: 64 BPM | WEIGHT: 157 LBS | DIASTOLIC BLOOD PRESSURE: 65 MMHG | BODY MASS INDEX: 26.53 KG/M2 | SYSTOLIC BLOOD PRESSURE: 125 MMHG

## 2019-08-01 DIAGNOSIS — M50.30 DDD (DEGENERATIVE DISC DISEASE), CERVICAL: ICD-10-CM

## 2019-08-01 DIAGNOSIS — M79.7 FIBROMYALGIA: ICD-10-CM

## 2019-08-01 DIAGNOSIS — G89.4 CHRONIC PAIN SYNDROME: ICD-10-CM

## 2019-08-01 DIAGNOSIS — M48.02 CERVICAL SPINAL STENOSIS: ICD-10-CM

## 2019-08-01 PROCEDURE — 1036F TOBACCO NON-USER: CPT | Performed by: INTERNAL MEDICINE

## 2019-08-01 PROCEDURE — 1123F ACP DISCUSS/DSCN MKR DOCD: CPT | Performed by: INTERNAL MEDICINE

## 2019-08-01 PROCEDURE — G8427 DOCREV CUR MEDS BY ELIG CLIN: HCPCS | Performed by: INTERNAL MEDICINE

## 2019-08-01 PROCEDURE — 99213 OFFICE O/P EST LOW 20 MIN: CPT | Performed by: INTERNAL MEDICINE

## 2019-08-01 PROCEDURE — 1090F PRES/ABSN URINE INCON ASSESS: CPT | Performed by: INTERNAL MEDICINE

## 2019-08-01 PROCEDURE — 3017F COLORECTAL CA SCREEN DOC REV: CPT | Performed by: INTERNAL MEDICINE

## 2019-08-01 PROCEDURE — G8399 PT W/DXA RESULTS DOCUMENT: HCPCS | Performed by: INTERNAL MEDICINE

## 2019-08-01 PROCEDURE — 4040F PNEUMOC VAC/ADMIN/RCVD: CPT | Performed by: INTERNAL MEDICINE

## 2019-08-01 PROCEDURE — G8417 CALC BMI ABV UP PARAM F/U: HCPCS | Performed by: INTERNAL MEDICINE

## 2019-08-01 RX ORDER — GABAPENTIN 300 MG/1
300 CAPSULE ORAL 3 TIMES DAILY
Qty: 90 CAPSULE | Refills: 1 | Status: SHIPPED | OUTPATIENT
Start: 2019-08-01 | End: 2021-03-31

## 2019-08-01 RX ORDER — HYDROCODONE BITARTRATE AND ACETAMINOPHEN 7.5; 325 MG/1; MG/1
1 TABLET ORAL EVERY 6 HOURS PRN
Qty: 120 TABLET | Refills: 0 | Status: SHIPPED | OUTPATIENT
Start: 2019-08-01 | End: 2019-08-29 | Stop reason: SDUPTHER

## 2019-08-01 NOTE — PROGRESS NOTES
Cooper Torres  1950  [id number]    HISTORY OF PRESENT ILLNESS:  Ms. Mago Samuel is a 77 y.o. female returns for a follow up visit for multiple medical problems. Her current presenting problems are   1. Chronic pain syndrome    2. DDD (degenerative disc disease), cervical    3. Fibromyalgia    4. Cervical spinal stenosis    . As per information/history obtained from the PADT(patient assessment and documentation tool) - She complains of pain in the neck, upper back and mid back with radiation to the shoulders Left She rates the pain 6/10 and describes it as aching, burning, throbbing. Pain is made worse by: bending. Current treatment regimen has helped relieve about 60% of the pain. She denies side effects from the current pain regimen. Patient reports that since the last follow up visit the physical functioning is worse, family/social relationships are unchanged, mood is unchanged and sleep patterns are worse, and that the overall functioning is unchanged. Patient denies neurological bowel or bladder. Patient denies misusing/abusing her narcotic pain medications or using any illegal drugs. There are No indicators for possible drug abuse, addiction or diversion problems. Upon obtaining the medical history from Ms. Mago Samuel regarding today's office visit for her presenting problems, Ms. Mago Samuel states is in the middle of moving, she is downsizing. She states she is using her medications. She reports her neck hurts worse than her back. She mentions she is using Neurontin 900 mg, denies any side effects with the medications. Patient states she wants to get medical marijuana, she fill the Rx but scared of using it, she has not started it yet. ALLERGIES: Patients list of allergies were reviewed     MEDICATIONS: Ms. Mago Samuel list of medications were reviewed. Her current medications are   Outpatient Medications Prior to Visit   Medication Sig Dispense Refill    Zolpidem Tartrate (AMBIEN PO) Take 10 mg by mouth daily

## 2019-08-29 ENCOUNTER — OFFICE VISIT (OUTPATIENT)
Dept: PAIN MANAGEMENT | Age: 69
End: 2019-08-29
Payer: MEDICARE

## 2019-08-29 VITALS
HEART RATE: 69 BPM | DIASTOLIC BLOOD PRESSURE: 73 MMHG | BODY MASS INDEX: 26.53 KG/M2 | SYSTOLIC BLOOD PRESSURE: 143 MMHG | WEIGHT: 157 LBS

## 2019-08-29 DIAGNOSIS — G89.4 CHRONIC PAIN SYNDROME: ICD-10-CM

## 2019-08-29 DIAGNOSIS — M79.7 FIBROMYALGIA: ICD-10-CM

## 2019-08-29 DIAGNOSIS — M50.30 DDD (DEGENERATIVE DISC DISEASE), CERVICAL: ICD-10-CM

## 2019-08-29 DIAGNOSIS — M48.02 CERVICAL SPINAL STENOSIS: ICD-10-CM

## 2019-08-29 PROCEDURE — 3017F COLORECTAL CA SCREEN DOC REV: CPT | Performed by: INTERNAL MEDICINE

## 2019-08-29 PROCEDURE — 1123F ACP DISCUSS/DSCN MKR DOCD: CPT | Performed by: INTERNAL MEDICINE

## 2019-08-29 PROCEDURE — G8427 DOCREV CUR MEDS BY ELIG CLIN: HCPCS | Performed by: INTERNAL MEDICINE

## 2019-08-29 PROCEDURE — 1036F TOBACCO NON-USER: CPT | Performed by: INTERNAL MEDICINE

## 2019-08-29 PROCEDURE — 1090F PRES/ABSN URINE INCON ASSESS: CPT | Performed by: INTERNAL MEDICINE

## 2019-08-29 PROCEDURE — G8399 PT W/DXA RESULTS DOCUMENT: HCPCS | Performed by: INTERNAL MEDICINE

## 2019-08-29 PROCEDURE — 4040F PNEUMOC VAC/ADMIN/RCVD: CPT | Performed by: INTERNAL MEDICINE

## 2019-08-29 PROCEDURE — G8417 CALC BMI ABV UP PARAM F/U: HCPCS | Performed by: INTERNAL MEDICINE

## 2019-08-29 PROCEDURE — 99213 OFFICE O/P EST LOW 20 MIN: CPT | Performed by: INTERNAL MEDICINE

## 2019-08-29 RX ORDER — HYDROCODONE BITARTRATE AND ACETAMINOPHEN 7.5; 325 MG/1; MG/1
1 TABLET ORAL EVERY 6 HOURS PRN
Qty: 120 TABLET | Refills: 0 | Status: SHIPPED | OUTPATIENT
Start: 2019-08-29 | End: 2019-10-03

## 2019-08-29 NOTE — PROGRESS NOTES
Eula Gentle  1950  [id number]      HISTORY OF PRESENT ILLNESS:  Ms. Jaimie Salcedo is a 77 y.o. female returns for a follow up visit for pain management  She has a diagnosis of   1. Chronic pain syndrome    2. DDD (degenerative disc disease), cervical    3. Fibromyalgia    4. Cervical spinal stenosis    . She complains of pain in the Neck, Low back, Bilateral shoulders and hips She rates the pain 5/10 and describes it as sharp, aching, burning. Current treatment regimen has helped relieve about 50% of the pain. She denies any side effects from the current pain regimen. Patient reports that since the last follow up visit the physical functioning is unchanged, family/social relationships are unchanged, mood is unchanged sleep patterns are unchanged, and that the overall functioning is unchanged. Patient denies misusing/abusing her narcotic pain medications or using any illegal drugs. There are No indicators for possible drug abuse, addiction or diversion problems. Patient reports she has not used the medical marijuana yet and wants to think about it before she starts. She says she is still on Neurontin along with Norco. She mentions he is going on Vacation out of town. She states she is using Norco 3-4 per day along with Neurontin. She denies any side effects with the medication. ALLERGIES: Patients list of allergies were reviewed     MEDICATIONS: Ms. Jaimie Salcedo list of medications were reviewed. Her current medications are   Outpatient Medications Prior to Visit   Medication Sig Dispense Refill    gabapentin (NEURONTIN) 300 MG capsule Take 1 capsule by mouth 3 times daily for 30 days. 90 capsule 1    HYDROcodone-acetaminophen (NORCO) 7.5-325 MG per tablet Take 1 tablet by mouth every 6 hours as needed for Pain (max 3-4/day) for up to 35 days. 120 tablet 0    Zolpidem Tartrate (AMBIEN PO) Take 10 mg by mouth daily as needed        No facility-administered medications prior to visit.         SOCIAL/FAMILY/PAST protocol. Current Outpatient Medications   Medication Sig Dispense Refill    gabapentin (NEURONTIN) 300 MG capsule Take 1 capsule by mouth 3 times daily for 30 days. 90 capsule 1    HYDROcodone-acetaminophen (NORCO) 7.5-325 MG per tablet Take 1 tablet by mouth every 6 hours as needed for Pain (max 3-4/day) for up to 35 days. 120 tablet 0    Zolpidem Tartrate (AMBIEN PO) Take 10 mg by mouth daily as needed        No current facility-administered medications for this visit. I will continue her current medication regimen  which is part of the above treatment schedule. It has been helping with Ms. Doran's chronic  medical problems which for this visit include:   Diagnoses of Chronic pain syndrome, DDD (degenerative disc disease), cervical, Fibromyalgia, and Cervical spinal stenosis were pertinent to this visit. Risks and benefits of the medications and other alternative treatments  including no treatment were discussed with the patient. The common side effects of these medications were also explained to the patient. Informed verbal consent was obtained. Goals of current treatment regimen include improvement in pain, restoration of functioning- with focus on improvement in physical performance, general activity, work or disability,emotional distress, health care utilization and  decreased medication consumption. Will continue to monitor progress towards achieving/maintaining therapeutic goals with special emphasis on  1. Improvement in perceived interfernce  of pain with ADL's. Ability to do home exercises independently. Ability to do household chores indoor and/or outdoor work and social and leisure activities. Improve psychosocial and physical functioning. - she is showing progression towards this treatment goal with the current regimen.        She was advised against drinking alcohol with the narcotic pain medicines, advised against driving or handling machinery while adjusting the dose of medicines or

## 2021-01-22 ENCOUNTER — HOSPITAL ENCOUNTER (OUTPATIENT)
Dept: GENERAL RADIOLOGY | Age: 71
Discharge: HOME | End: 2021-01-22
Payer: MEDICARE

## 2021-01-22 ENCOUNTER — HOSPITAL ENCOUNTER (OUTPATIENT)
Age: 71
Discharge: HOME | End: 2021-01-22
Payer: MEDICARE

## 2021-01-22 DIAGNOSIS — R52 PAIN: ICD-10-CM

## 2021-01-22 PROCEDURE — 72110 X-RAY EXAM L-2 SPINE 4/>VWS: CPT

## 2021-02-03 ENCOUNTER — HOSPITAL ENCOUNTER (OUTPATIENT)
Age: 71
Discharge: HOME | End: 2021-02-03
Payer: MEDICARE

## 2021-02-03 ENCOUNTER — HOSPITAL ENCOUNTER (OUTPATIENT)
Dept: GENERAL RADIOLOGY | Age: 71
Discharge: HOME | End: 2021-02-03
Payer: MEDICARE

## 2021-02-03 DIAGNOSIS — R52 PAIN: ICD-10-CM

## 2021-02-03 PROCEDURE — 73502 X-RAY EXAM HIP UNI 2-3 VIEWS: CPT

## 2021-02-03 PROCEDURE — 72050 X-RAY EXAM NECK SPINE 4/5VWS: CPT

## 2021-03-31 ENCOUNTER — OFFICE VISIT (OUTPATIENT)
Dept: ORTHOPEDIC SURGERY | Age: 71
End: 2021-03-31
Payer: MEDICARE

## 2021-03-31 VITALS — HEIGHT: 65 IN | RESPIRATION RATE: 12 BRPM | WEIGHT: 157 LBS | BODY MASS INDEX: 26.16 KG/M2 | TEMPERATURE: 97.2 F

## 2021-03-31 DIAGNOSIS — M51.26 LUMBAR DISC HERNIATION: ICD-10-CM

## 2021-03-31 DIAGNOSIS — M76.892 HIP ABDUCTOR TENDONITIS, LEFT: Primary | ICD-10-CM

## 2021-03-31 PROCEDURE — 4040F PNEUMOC VAC/ADMIN/RCVD: CPT | Performed by: ORTHOPAEDIC SURGERY

## 2021-03-31 PROCEDURE — G8417 CALC BMI ABV UP PARAM F/U: HCPCS | Performed by: ORTHOPAEDIC SURGERY

## 2021-03-31 PROCEDURE — G8399 PT W/DXA RESULTS DOCUMENT: HCPCS | Performed by: ORTHOPAEDIC SURGERY

## 2021-03-31 PROCEDURE — 1036F TOBACCO NON-USER: CPT | Performed by: ORTHOPAEDIC SURGERY

## 2021-03-31 PROCEDURE — 1090F PRES/ABSN URINE INCON ASSESS: CPT | Performed by: ORTHOPAEDIC SURGERY

## 2021-03-31 PROCEDURE — 99204 OFFICE O/P NEW MOD 45 MIN: CPT | Performed by: ORTHOPAEDIC SURGERY

## 2021-03-31 PROCEDURE — G8484 FLU IMMUNIZE NO ADMIN: HCPCS | Performed by: ORTHOPAEDIC SURGERY

## 2021-03-31 PROCEDURE — 1123F ACP DISCUSS/DSCN MKR DOCD: CPT | Performed by: ORTHOPAEDIC SURGERY

## 2021-03-31 PROCEDURE — G8427 DOCREV CUR MEDS BY ELIG CLIN: HCPCS | Performed by: ORTHOPAEDIC SURGERY

## 2021-03-31 PROCEDURE — 3017F COLORECTAL CA SCREEN DOC REV: CPT | Performed by: ORTHOPAEDIC SURGERY

## 2021-03-31 RX ORDER — LORAZEPAM 1 MG/1
1 TABLET ORAL 2 TIMES DAILY
Qty: 60 TABLET | Refills: 0 | Status: CANCELLED | OUTPATIENT
Start: 2021-03-31 | End: 2021-04-30

## 2021-03-31 RX ORDER — FLUTICASONE PROPIONATE 50 MCG
SPRAY, SUSPENSION (ML) NASAL
COMMUNITY
Start: 2021-01-21

## 2021-03-31 RX ORDER — HYDROCODONE BITARTRATE AND ACETAMINOPHEN 5; 325 MG/1; MG/1
1 TABLET ORAL EVERY 6 HOURS PRN
Qty: 12 TABLET | Refills: 0 | Status: SHIPPED | OUTPATIENT
Start: 2021-03-31 | End: 2021-04-03

## 2021-03-31 RX ORDER — AMLODIPINE BESYLATE 2.5 MG/1
2.5 TABLET ORAL DAILY
COMMUNITY
Start: 2020-04-23

## 2021-03-31 RX ORDER — IBUPROFEN 800 MG/1
800 TABLET ORAL EVERY 6 HOURS PRN
COMMUNITY
End: 2022-07-09

## 2021-03-31 RX ORDER — LORAZEPAM 1 MG/1
0.5 TABLET ORAL
Qty: 2 TABLET | Refills: 0 | Status: SHIPPED | OUTPATIENT
Start: 2021-03-31 | End: 2021-03-31

## 2021-03-31 RX ORDER — HYDROCODONE BITARTRATE AND ACETAMINOPHEN 5; 325 MG/1; MG/1
1 TABLET ORAL EVERY 6 HOURS PRN
Qty: 20 TABLET | Refills: 0 | Status: CANCELLED | OUTPATIENT
Start: 2021-03-31 | End: 2021-04-05

## 2021-03-31 NOTE — PROGRESS NOTES
Date:  3/31/2021    Name:  Lorenzo Keyes  [facility] [id number]    :  1950      Age:   72 y.o.    SSN:  [id number]      Medical Record Number:  [id number]    Reason for Visit:    Chief Complaint    Hip Pain (NP, Left hip pain. No NICKY. Pt notes seeing Dr. Yasmine Garcia in 2018 and received hip injection into GTB. Notes doing well until roughly 1 year ago when pain began to return. Now over the past few months pain has worsened and she can no longer walk long distances or tolerate stairs. Reports previously walking 2 miles, up hills, without issues. )      DOS:3/31/2021     HPI: Eddie Ocampo is a 72 y.o. female here today for left hip pain and burning discomfort at the front of the hip and medial calf. The hip pain has been ongoing for roughly a year. This was a previous problem that was actually successfully treated with cortisone injections in the greater trochanteric bursa's by Dr. Yasmine Garcia back in 2018. She denies any recurrent injury. However over the past few months the pain is worsened and she has essentially pinpoint lateral sided hip pain worse with walking long distances stairs and sleeping on that side. She has tried no other medicines. Previously had extensive PT after the bursa injections and that seemed to have been helpful. Separately she is still noting some numbness along the medial aspect of her calf and some tingling down the toes radiating from the front of the hip. She has no history of previous low back pain. However has had previous history of cervical surgery but never radiating down to the legs. Pain Assessment  Location of Pain: Pelvis(Hip)  Location Modifiers: Left, Posterior  Severity of Pain: 6  Quality of Pain: Other (Comment), Dull, Aching, Throbbing(Numbness/tingling)  Duration of Pain: Persistent  Frequency of Pain: Constant  Aggravating Factors:  Other (Comment), Stairs, Bending(Pressure; lying on left side)  Limiting Behavior: Yes Relieving Factors: Rest  Result of Injury: No  Work-Related Injury: No  Are there other pain locations you wish to document?: No  ROS: Review of systems reviewed from Patient History Form completed today and available in the patient's chart under the Media tab. Past Medical History:   Diagnosis Date    Asthma     as a child    Fibromyalgia         Past Surgical History:   Procedure Laterality Date    BUNIONECTOMY      EYE SURGERY      steve cateracts, detached retina    SHOULDER ARTHROSCOPY Left 2018    left shoulder, SAD, DCE, Rotator cuff repair with graft, extensive debridement    TUBAL LIGATION         History reviewed. No pertinent family history.     Social History     Socioeconomic History    Marital status:      Spouse name: None    Number of children: None    Years of education: None    Highest education level: None   Occupational History    None   Social Needs    Financial resource strain: None    Food insecurity     Worry: None     Inability: None    Transportation needs     Medical: None     Non-medical: None   Tobacco Use    Smoking status: Former Smoker     Packs/day: 1.00     Years: 20.00     Pack years: 20.00     Quit date: 2005     Years since quittin.2    Smokeless tobacco: Never Used   Substance and Sexual Activity    Alcohol use: Yes     Comment: 2 per week    Drug use: No    Sexual activity: None   Lifestyle    Physical activity     Days per week: None     Minutes per session: None    Stress: None   Relationships    Social connections     Talks on phone: None     Gets together: None     Attends Sikhism service: None     Active member of club or organization: None     Attends meetings of clubs or organizations: None     Relationship status: None    Intimate partner violence     Fear of current or ex partner: None     Emotionally abused: None     Physically abused: None     Forced sexual activity: None   Other Topics Concern    None   Social History Narrative    None       Current Outpatient Medications   Medication Sig Dispense Refill    amLODIPine (NORVASC) 2.5 MG tablet Take 2.5 mg by mouth daily      fluticasone (FLONASE) 50 MCG/ACT nasal spray FLONASE ALLERGY RELIEF SUSP      Loratadine (CLARITIN PO) Take by mouth      ibuprofen (ADVIL;MOTRIN) 800 MG tablet Take 800 mg by mouth every 6 hours as needed for Pain      diclofenac (VOLTAREN) 50 MG EC tablet Take 1 tablet by mouth 2 times daily (with meals) 60 tablet 3    HYDROcodone-acetaminophen (NORCO) 5-325 MG per tablet Take 1 tablet by mouth every 6 hours as needed for Pain for up to 3 days. Intended supply: 3 days. Take lowest dose possible to manage pain 12 tablet 0    LORazepam (ATIVAN) 1 MG tablet Take 0.5 tablets by mouth once as needed for Anxiety (pre mri by 30 minutes) for up to 1 dose. 2 tablet 0    Zolpidem Tartrate (AMBIEN PO) Take 10 mg by mouth daily as needed        No current facility-administered medications for this visit. Allergies   Allergen Reactions    Tramadol     Sulfa Antibiotics Hives    Codeine Nausea Only       Vital signs:  Temp 97.2 °F (36.2 °C)   Resp 12   Ht 5' 4.5\" (1.638 m)   Wt 157 lb (71.2 kg)   BMI 26.53 kg/m²        Constitutional: The physical examination finds the patient to be well-developed and well-nourished. The patient is alert and oriented x3 and was cooperative throughout the visit. Neuro: no focal deficits noted. Normal mood, judgement, decision making  Eyes: sclera clear, EOMI  Ears: Normal external ear  Mouth:  No perioral lesions  Pulm: Respirations unlabored and regular  Pulse: Extremities well perfused, warm, capillary refill < 2 seconds  Musculoskeletal:    Hip Examination: Left    Skin/Inspection: no skin lesions, cellulitis, or extreme edema in the lower extremities. Standing/Walking: Mildly -antalgic gait, no pelvic tilt, negative Trendelenburg sign.  No use of assistive devices    Sitting Exam: 5/5 Hip Flexor Strength, 5/5 Abductor Strength, 5/5 Adductor strength, Negative Straight Leg Raise    Supine Exam: Non tender around the ASIS, AIIS  Flexion arc 0 to 100deg, with with some pain in terminal flexion in the abductors  Special Tests: Negative deep Flexion Test, negFADIR , hip pain with ELHAM, negative resisted sit-up (Athletic Pubalgia). Side Lying Exam: Severely tender at greater trochanter, abductor musculature, and TFL origin. Abductor side leg raise deferred due to pain . Deferred due to pain OberTest    Prone Exam: Mild hip flexion contracture, internal rotation to 25 deg, external rotation to 45 deg. Non-tender at the ischial tuberosity nor proximal hamstring      Contralateral Hip Examination: Right    Skin/Inspection: no skin lesions, cellulitis, or extreme edema in the lower extremities. Standing/Walking: Normal non-antalgic gait, no pelvic tilt, negative Trendelenburg sign. No use of assistive devices    Sitting Exam: 5/5 Hip Flexor Strength, 5/5 Abductor Strength, 5/5 Adductor strength, Negative Straight Leg Raise    Supine Exam: Non tender around the ASIS, AIIS  Flexion arc 0 to 100deg, with no pain or difficulty. Special Tests: neg Deep Flexion Test, no FADIR , if negative pain with ELHAM, negative resisted sit-up (Athletic Pubalgia). Side Lying Exam: Non-tender at greater trochanter, abductor musculature, nor TFL origin. Abductor side leg raise 5/5 strength. Negative OberTest    Prone Exam: Negative hip flexion contracture, internal rotation to 25 deg, external rotation to 45 deg. Non-tender at the ischial tuberosity nor proximal hamstring      Diagnostics:  Radiology:       Pertinent imaging reviewed, images only - no report available.     Radiographs were obtained and reviewed in the office; 3 views: AP Pelvis and left hip 45-deg Davis View, and left False Profile View    Tonnis ndGndrndanddndend:nd nd2nd LCEA: 20  Alpha Angle: 55  Cross over-sign is negative  Other: Negative Coxa Profunda, Negative Protrusio    Impression: No major disease joint space narrowing, no acute findings no femoral head collapse. Calcification of the abductor tendon insertion laterally in the greater trochanter. Assessment: Patient is a 72 y.o. female lateral sided hip pain related to diagnosis of abductor tendinopathy possible tear of the glue medius minimus. Separately she has some anterior thigh and calf radiculopathy consistent with positive femoral stretch test on exam, possibly due to some lumbar spine radiculopathy. Impression:  Visit Diagnoses       Codes    Hip abductor tendonitis, left    -  Primary Z14.898    Lumbar disc herniation     M51.26          Office Procedures:  Orders Placed This Encounter   Medications    diclofenac (VOLTAREN) 50 MG EC tablet     Sig: Take 1 tablet by mouth 2 times daily (with meals)     Dispense:  60 tablet     Refill:  3    HYDROcodone-acetaminophen (NORCO) 5-325 MG per tablet     Sig: Take 1 tablet by mouth every 6 hours as needed for Pain for up to 3 days. Intended supply: 3 days. Take lowest dose possible to manage pain     Dispense:  12 tablet     Refill:  0     Reduce doses taken as pain becomes manageable    LORazepam (ATIVAN) 1 MG tablet     Sig: Take 0.5 tablets by mouth once as needed for Anxiety (pre mri by 30 minutes) for up to 1 dose.      Dispense:  2 tablet     Refill:  0     Orders Placed This Encounter   Procedures    MRI HIP LEFT WO CONTRAST     Standing Status:   Future     Standing Expiration Date:   3/31/2022     Scheduling Instructions:      [address] [place] [address] [facility]            PHONE: [phone]      FAX: [phone]            MRI LEFT HIP W/O CONTRAST       R/O HIP ABDUCTOR TEAR             MRI SCHEDULED FOR: 04/07/2021 @ 5:00PM       **MRI'S OF HIPS TO BE READ BY DR. Emeli LOVELACE, PER DR. Raudel Shultz            PLEASE NOTE: IMAGING RESULTS ARE NOT GIVEN OVER THE PHONE. AN IN-OFFICE APPOINTMENT IS REQUIRED UNLESS OTHER ARRANGEMENTS ARE PREVIOUSLY MADE. PLEASE SCHEDULE THIS PRIOR TO LEAVING THE OFFICE TODAY.  MRI LUMBAR SPINE WO CONTRAST     Standing Status:   Future     Standing Expiration Date:   3/31/2022     Scheduling Instructions:      [address] Gisselle Wiseman            PHONE: [phone]      FAX: [phone]            MRI LUMBAR SPINE W/O CONTRAST       R/O LUMBAR SPINE [address]ison HERNIATION             MRI SCHEDULED FOR: 04/07/2021 @ 5:00PM            PLEASE NOTE: IMAGING RESULTS ARE NOT GIVEN OVER THE PHONE. AN IN-OFFICE APPOINTMENT IS REQUIRED UNLESS OTHER ARRANGEMENTS ARE PREVIOUSLY MADE. PLEASE SCHEDULE THIS PRIOR TO LEAVING THE OFFICE TODAY. Plan:  Pertinent imaging was reviewed. The etiology, natural history, and treatment options for the disorder were discussed. The roles of activity medication, antiinflammatories, injections, bracing, physical therapy, and surgical interventions were all described to the patient and questions were answered. We believe patient is a candidate for a left hip MRI and a lumbar spine MRI. With a hip MRI I would like to rule out a full-thickness abductor tear. Separately with the lumbar spine MRI I would like to rule out lumbar disc herniation or facet arthropathy explaining her radiculopathy symptoms down the left thigh. This could be more consistent with An L3-4 rather than L5 compression. We will see her back after the MRIs to reach a definitive diagnosis and outline a treatment plan for her. All the patient's questions were answered while in the clinic. The patient is understanding of all instructions and agrees with the plan. Approximately 60 minutes was spent on patient education and coordinating care. Follow up in: No follow-ups on file.     Ettore.Notice, VAS/ HOS Self assessment forms    Sincerely,    Sincerely,    Giorgi Sims MD [facility]  Orthopaedic Surgeon - Hip Preservation & Sports Medicine   [address] [place] [address] [address], [address] [facility] [address], [address]  Email: [email or web address]  Office: [phone]    03/31/21  4:50 PM    This dictation was performed with a verbal recognition program (DRAGON) and it was checked for errors. It is possible that there are still dictated errors within this office note. If so, please bring any errors to my attention for an addendum. All efforts were made to ensure that this office note is accurate.

## 2021-04-14 ENCOUNTER — OFFICE VISIT (OUTPATIENT)
Dept: ORTHOPEDIC SURGERY | Age: 71
End: 2021-04-14
Payer: MEDICARE

## 2021-04-14 VITALS — WEIGHT: 158 LBS | HEIGHT: 65 IN | BODY MASS INDEX: 26.33 KG/M2 | RESPIRATION RATE: 12 BRPM

## 2021-04-14 DIAGNOSIS — M24.152 DEGENERATIVE TEAR OF ACETABULAR LABRUM OF LEFT HIP: ICD-10-CM

## 2021-04-14 DIAGNOSIS — M70.62 GREATER TROCHANTERIC BURSITIS OF LEFT HIP: Primary | ICD-10-CM

## 2021-04-14 DIAGNOSIS — M51.26 LUMBAR DISC HERNIATION: ICD-10-CM

## 2021-04-14 DIAGNOSIS — M51.36 DDD (DEGENERATIVE DISC DISEASE), LUMBAR: Primary | ICD-10-CM

## 2021-04-14 DIAGNOSIS — M51.36 DDD (DEGENERATIVE DISC DISEASE), LUMBAR: ICD-10-CM

## 2021-04-14 DIAGNOSIS — M48.061 LUMBAR FORAMINAL STENOSIS: ICD-10-CM

## 2021-04-14 PROCEDURE — G8399 PT W/DXA RESULTS DOCUMENT: HCPCS | Performed by: ORTHOPAEDIC SURGERY

## 2021-04-14 PROCEDURE — 4040F PNEUMOC VAC/ADMIN/RCVD: CPT | Performed by: ORTHOPAEDIC SURGERY

## 2021-04-14 PROCEDURE — G8417 CALC BMI ABV UP PARAM F/U: HCPCS | Performed by: ORTHOPAEDIC SURGERY

## 2021-04-14 PROCEDURE — G8427 DOCREV CUR MEDS BY ELIG CLIN: HCPCS | Performed by: ORTHOPAEDIC SURGERY

## 2021-04-14 PROCEDURE — 1090F PRES/ABSN URINE INCON ASSESS: CPT | Performed by: ORTHOPAEDIC SURGERY

## 2021-04-14 PROCEDURE — 3017F COLORECTAL CA SCREEN DOC REV: CPT | Performed by: ORTHOPAEDIC SURGERY

## 2021-04-14 PROCEDURE — 1123F ACP DISCUSS/DSCN MKR DOCD: CPT | Performed by: ORTHOPAEDIC SURGERY

## 2021-04-14 PROCEDURE — 99214 OFFICE O/P EST MOD 30 MIN: CPT | Performed by: ORTHOPAEDIC SURGERY

## 2021-04-14 PROCEDURE — 20610 DRAIN/INJ JOINT/BURSA W/O US: CPT | Performed by: ORTHOPAEDIC SURGERY

## 2021-04-14 PROCEDURE — 1036F TOBACCO NON-USER: CPT | Performed by: ORTHOPAEDIC SURGERY

## 2021-04-14 RX ORDER — METHYLPREDNISOLONE ACETATE 40 MG/ML
40 INJECTION, SUSPENSION INTRA-ARTICULAR; INTRALESIONAL; INTRAMUSCULAR; SOFT TISSUE ONCE
Status: COMPLETED | OUTPATIENT
Start: 2021-04-14 | End: 2021-04-14

## 2021-04-14 RX ADMIN — METHYLPREDNISOLONE ACETATE 40 MG: 40 INJECTION, SUSPENSION INTRA-ARTICULAR; INTRALESIONAL; INTRAMUSCULAR; SOFT TISSUE at 13:11

## 2021-04-14 NOTE — PROGRESS NOTES
4/14/21  12:09 PM        NDC: 92131-914-96   -   Ropivacaine 0.5 %    LOT: [id number]    COMMENT: LEFT GT BURSA INJECTION

## 2021-04-14 NOTE — PROGRESS NOTES
deep to the left gluteus medius muscle. This measures 5.5 x 1.3 x 1.4 cm. 3.  Mild to moderate distal left gluteus minimus tendinosis with few small, thin/low-grade partial-thickness and interstitial tears. Low-grade distal left gluteus medius tendon strain, with an associated 1.5 x 0.4 x 1 cm chronic avulsion fracture fragment near the left greater trochanter low-grade left hamstring origin tendinosis with mild left ischiofemoral impingement    MRI lumbar spine performed on 4/12/2021:  1. Minimal grade 1 lateral retrolisthesis at L3-4. Left foraminal protrusion within annular fissure and low-grade left foraminal stenosis. Mild to moderate right and mild left facet hypertrophy with ligamentum flavum thickening. 2.  At L4-5, disc space narrowing. Left foraminal protrusion with left low-grade foraminal stenosis. Right parasagittal to extraforaminal neck spondylotic protrusion, with adjacent endplate hypertrophy. Mild to moderate foraminal stenosis with mild abutment of exiting right L4 nerve root. Right lateral recess stenosis and mild abutment of transsitting right L5 nerve root. Mild bilateral facet hypertrophy and ligamentum flavum thickening. Low-grade spinal stenosis. 3.  At L5-S1, left foraminal neck spondylotic protrusion with adjacent endplate hypertrophy. Low-grade left foraminal stenosis. Right foraminal neck spondylotic displacement with adjacent endplate hypertrophy. Mild to moderate foraminal stenosis with abutment of exiting right L5 nerve root    Assessment: Patient is a 72 y.o. female with hip spine syndrome, but an MRI diagnosis of abductor disease, partial tear/avulsion, degenerative hip labral tearing, and multilevel DDD in her back with foraminal stenosis at L3/4 left side.       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     M24.152    DDD (degenerative disc disease), lumbar     M51.36    Lumbar foraminal stenosis     M48.061          Office Procedures:  Orders Placed This Encounter   Medications    methylPREDNISolone acetate (DEPO-MEDROL) injection 40 mg     Orders Placed This Encounter   Procedures    US GUIDED NEEDLE PLACEMENT     Standing Status:   Future     Standing Expiration Date:   4/14/2022    Ambulatory referral to Physical Therapy     Referral Priority:   Routine     Referral Type:   Eval and Treat     Referral Reason:   Specialty Services Required     Requested Specialty:   Physical Therapy     Number of Visits Requested:   1    ND ARTHROCENTESIS ASPIR&/INJ MAJOR JT/BURSA W/O US       Plan:  Pertinent imaging was reviewed. The etiology, natural history, and treatment options for the disorder were discussed. The roles of activity medication, antiinflammatories, injections, bracing, physical therapy, and surgical interventions were all described to the patient and questions were answered. We believe patient is a candidate for a left hip abductor GT injection, a hip conditioning program, and an epidural steroid injection at L3/4, L4/5. The patient was given the option of performing today's injection using ultrasound guidance. We discussed the pros and cons of using the ultrasound for guidance. The patient chose to proceed, and today's injection was performed using Logic E ultrasound unit with a variable frequency (10 MHz) linear transducer for localization and needle placement. The image was saved for the medical record. Procedure: The indications and risks of steroid injection as well as treatment alternatives were discussed with the patient who consented to the procedure. Under sterile conditions and after informed consent was obtained the patient was given an injection into the LEFT greater trochanteric bursa.  Using a 20 gauge needle, 2cc  of 40 mg/mL of Depo-Medrol and 4 mL of 0.5% ropivacaine (Naropin) were placed in the GT bursa  after it was prepped with chlorhexidine. This resulted in good relief of symptoms. There were no complications. The patient was advised to ice the hip  this evening and to avoid vigorous activities for the next 2 days. They were advised to call us if there was any erythema, enduration, swelling or increasing pain. She has an upcoming trip to [facility]. We will re-assess her improvement with PT and a cortisone injection coming weeks. Should all these fail to resolve her abductor tendon disorder symptoms that we can plan for possible surgical repair. This would likely be an open procedure. All the patient's questions were answered while in the clinic. The patient is understanding of all instructions and agrees with the plan. Approximately 45 minutes was spent on patient education and coordinating care. Follow up in: Return in about 4 weeks (around 5/12/2021). Sincerely,    [place] Valadez [place] [address] [place] [address] [address], [address] [place] [address], [facility] [address] [facility]  Email: [email or web address]  Office: [phone]    04/14/21  1:02 PM      The encounter with Wallace Astorga was carried out by myself, Dr Mely Allison, who personally examined the patient and reviewed the plan. This dictation was performed with a verbal recognition program (DRAGON) and it was checked for errors. It is possible that there are still dictated errors within this office note. If so, please bring any errors to my attention for an addendum. All efforts were made to ensure that this office note is accurate.

## 2021-04-14 NOTE — LETTER
Physical Therapy Rehabilitation Referral    Patient Name: Nakia Jimenez MRN: [id number]  DOS: 4/14/2021     Diagnosis:   1. Greater trochanteric bursitis of left hip    2. Degenerative tear of acetabular labrum of left hip    3. DDD (degenerative disc disease), lumbar    4.  Lumbar foraminal stenosis            Precautions:     [x] Evaluate and Treat    Post Op Instructions:  [] Continuous passive motion (CPM)   [] AAROM: Flexion to 90   [] Uni-planar passive range of motion   [] AAROM: External rotation to  10   [] Hip brace on at all times    [] AAROM: Extension to 10   [] Hip brace when hip at risk     [] AAROM:  Neutral IR   [] Home exercise program (copy to patient)   [] AAROM: Abduction to 25    [] Isometric external rotator strengthening    [] Isometric glute max strengthening     [] Isometric abductor strengthening              Post-op Phases:  []Phase I: Maximum Protection (Day 1-3 Weeks)  []Phase II: Mobility & Neuromuscular Retraining (3-6 Weeks)  []Phase III: Phase III: Muscle Balance and Strengthening (6-12 Weeks)  []Phase IV: Functional Training of the Hip & Lower Extremity (12-18 Weeks)  []Phase V: Advanced Training  Specificity for Return to Sport and/or Work (18-24 Wk)    Non-Operative & Pre-Operative Rehabilitation:    Cardiovascular:            Deep Hip Rotators  [x] Upright Bike (Baseline)           [x] External Rotators AROM in 4PK (Baseline)  [] Walking (Progression 1)           [x] Internal Rotators AROM in 4PK (Baseline)  [] Running (Progression 2)           [] ER in side lying (Progression 1)  [] Dynamic Loading (Progression 3)          [] IR in side lying (Progression 1)                [] ER with resistance in 4PK (Progression 2)                [] IR with resistance in 4PK (Progression 2)                 [] Lateral Step Up (Progression 3)  Positioning:  [x] 4PK with pelvic tilts (Baseline)  [] Pelvic tilts in sitting (Progression 1)        Core:   [x] Relaxation Breathing (Baseline) [] [place] lying elbow presses (Progression 1)  [x] Side Planks (Baseline)         [] Side planks + hip abduction (Progression 1)  [x] Bird-Dog (Baseline)            [] Bird-Dog with resistance (Progression 1)    Glute Complex:            Load Transfer:  [x] Bridging (Baseline)             [x] Weight Shifting (Baseline)     [] Single Leg Bridge (Progression 1)         [] Single Leg Stance to SEBT(Progression 1)     [] Single Leg Lower (Progression 2)          [] Hip hinge + monster walks (Progression 2)       Mobility:  [x] Bart position with breathing (baseline)  [x] Hip Hinge with stick & neutral spine (baseline)  [x] Sit to stand (baseline)  [] Hip Hinge without guidance (Progression 1)  [] Hip Hinge with resisted punch out guidance (Progression 2)      Pelvic Floor:  [] Relaxation breathing         Activities:       [] Rowing       [] Stepper/Exercise bike     [] Swimming  [] Water exercises    Modalities:     Return to Sport:  [x] Of Choice      [] Plyometrics  [] Ultrasound     [] Rhythmic stabilization  [] Iontophoresis    [] Core strengthening   [] Moist heat     [] Sports specific program:   [] Massage         [x] Cryotherapy      [] Electrical stimulation     [] Paraffin  [] Whirlpool  [] TENS    [x] Home exercise program (copy to patient). Perform exercises for:   15     minutes    3      times/day  [x] Supervised physical therapy  Frequency: []  1x week  [x] 2x week  [] 3x week  [] Other:   Duration: [] 2 weeks   [] 4 weeks  [x] 6 weeks  [] Other:     Additional Instructions:       Sincerely,    Marcelino Joy MD [facility]  Hip Preservation & Sports Medicine Surgeon   [address] [facility] [address], [facility] [place]  Email: [email or web address]  Office: [phone]

## 2021-04-28 ENCOUNTER — HOSPITAL ENCOUNTER (OUTPATIENT)
Dept: PHYSICAL THERAPY | Age: 71
Setting detail: THERAPIES SERIES
Discharge: HOME | End: 2021-04-28
Payer: MEDICARE

## 2021-04-28 PROCEDURE — 97530 THERAPEUTIC ACTIVITIES: CPT

## 2021-04-28 PROCEDURE — 97162 PT EVAL MOD COMPLEX 30 MIN: CPT

## 2021-04-28 PROCEDURE — 97110 THERAPEUTIC EXERCISES: CPT

## 2021-04-28 PROCEDURE — 97140 MANUAL THERAPY 1/> REGIONS: CPT

## 2021-04-28 NOTE — PLAN OF CARE
[place], [facility], [address]  Phone: [phone]   Fax: [phone]     Physical Therapy Certification    Dear Referring Practitioner: Dr. Constantino Valenzuela,    We had the pleasure of evaluating the following patient for physical therapy services at [address] [place] [address]. A summary of our findings can be found in the initial assessment below. This includes our plan of care. If you have any questions or concerns regarding these findings, please do not hesitate to contact me at the office phone number checked above. Thank you for the referral.       Physician Signature:_______________________________Date:__________________  By signing above (or electronic signature), therapists plan is approved by physician      Patient: Annelise Vidal   : 1950   MRN: [id number]  Referring Physician: Referring Practitioner: Dr. Constantino Valenzuela      Evaluation Date: 2021      Medical Diagnosis Information:  Diagnosis: L Hip Intertrochanteric Bursistis M70.62; M51.36 Lumbar DDD, Lumbar Foraminal Stenosis M48.061   Treatment Diagnosis: Lumbosacral POstural Malalignments with decreased trunk/hip motion with Limited positions tolerance and pain with transitional movements especially with low levels                                         Insurance information: PT Insurance Information: Medicare; [facility]     Precautions/ Contra-indications: Dizziness/vertigo, cervical DDD  Latex Allergy:  [x]NO      []YES  Preferred Language for Healthcare:   [x]English       []Other:    C-SSRS Triggered by Intake questionnaire (Past 2 wk assessment ):   [x] No, Questionnaire did not trigger screening.   [] Yes, Patient intake triggered C-SSRS Screening     [] Completed, no further action required. [] Completed, PCP notified via Epic  Lumbar MRI :   CONCLUSION:   1. At L3-4, minimal, grade 1 retrolisthesis.  Left foraminal protrusion with thin Comments   Lumbar Flex 25% limited    Lumbar Ext 50% limited      ROM LEFT RIGHT Comments   Lumbar Side Bend 10 25 Fulcrums L4   Lumbar Rotation NT NT    Hip Flexion 111 130    Hip Abd 15 45    Hip ER 40 45    Hip IR 40 45    Hip Extension 9 10    Knee Ext [facility] WFL    Knee Flex [facility] WF    Hamstring Flex -10 -15    Piriformis Mod tight Mod tight    TFL 8\" 6\"    Quad 95 114    Iliopsoas 2 8              Joint mobility:    []Normal    [x]Hypo   []Hyper      Strength / Myotomes LEFT RIGHT Comments   Multifidus NT NT    Transverse Ab NT NT    Hip Flexors (L1-2) At least 3+/5 pan 5/5    Hip Abductors 4-/5 4/5    Hip Extensors 3-/5 4/5    Hip Internal Rotators 4-/5 pain 5/5    Hip External Rotators 4-/5 pain 5/5    Quads (L2-4) 5/5 5/5    Hamstrings  5/5 5/5    Ankle Plantarflexion (S1-2) 5/5 5/5    Ankle Dorsiflexion (L4-5) 5/5 5/5    Ankle Inversion 5/5 5/5    Ankle Eversion (S1-2) 5/5 5/5    Great Toe Extension (L5) 5/5 5/5            Neural dynamic tension testing Normal Abnormal Comments   Slump Test  - Degree of knee flexion:  X     SLR  X     0-30 x     30-70 x     Femoral nerve (L2-4) x         Orthopedic Special Tests:    Normal Abnormal N/A Comments   Toe walk   X      Heel Walk X      Fwd Bend-aberrant or innominate mvmt)  X on R     Standing Flexion test       Trendelenburg       Kemps/Quadrant       Stork       ELHAM/Tu    NT but MRI shows labral tears   Hip scour       SLR       Crossed SLR       Supine to sit       Hip thrust       SI distraction/compression       PA/Spring       Prone Instability test       Prone knee bend       Sacral Spring/thrust                  [x] Patient history, allergies, meds reviewed. Medical chart reviewed. See intake form. Review Of Systems (ROS):  [x]Performed Review of systems (Integumentary, CardioPulmonary, Neurological) by intake and observation. Intake form has been scanned into medical record.  Patient has been instructed to contact their primary care physician regarding ROS issues if not already being addressed at this time.       Co-morbidities/Complexities (which will affect course of rehabilitation):   []None        []Hx of COVID   Arthritic conditions   []Rheumatoid arthritis (M05.9)  []Osteoarthritis (M19.91)  []Gout   Cardiovascular conditions   [x]Hypertension (I10)  []Hyperlipidemia (E78.5)  []Angina pectoris (I20)  []Atherosclerosis (I70)  []Pacemaker  []Hx of CABG/stent/  cardiac surgeries   Musculoskeletal conditions   []Disc pathology   []Congenital spine pathologies   []Osteoporosis (M81.8)  []Osteopenia (M85.8)  []Scoliosis       Endocrine conditions   []Hypothyroid (E03.9)  []Hyperthyroid Gastrointestinal conditions   []Constipation (L24.69)   Metabolic conditions   []Morbid obesity (E66.01)  []Diabetes type 1(E10.65) or 2 (E11.65)   []Neuropathy (G60.9)     Cardio/Pulmonary conditions   []Asthma (J45)  []Coughing   []COPD (J44.9)  []CHF  []A-fib   Psychological Disorders  [x]Anxiety (F41.9)  []Depression (F32.9)   []Other:   Developmental Disorders  []Autism (F84.0)  []CP (G80)  []Down Syndrome (Q90.9)  []Developmental delay     Neurological conditions  []Prior Stroke (I69.30)  []Parkinson's (G20)  []Encephalopathy (G93.40)  []MS (G35)  []Post-polio (G14)  []SCI  []TBI  []ALS Other conditions  []Fibromyalgia (M79.7)  []Vertigo  []Syncope  []Kidney Failure  []Cancer      []currently undergoing                treatment  []Pregnancy  []Incontinence   Prior surgeries  []involved limb  []previous spinal surgery  [] section birth  []hysterectomy  []bowel / bladder surgery  []other relevant surgeries   [x]Other:   R RTC surgery, Bunionectomy, cervical DDD with previous PT            Barriers to/and or personal factors that will affect rehab potential:              []Age  []Sex    []Smoker              []Motivation/Lack of Motivation                        []Co-Morbidities              []Cognitive Function, education/learning barriers Participation Restrictions   [x]Reduced participation in self care activities   [x]Reduced participation in home management activities   []Reduced participation in work activities   [x]Reduced participation in social activities. [x]Reduced participation in sport/recreational activities. Classification:   []Signs/symptoms consistent with Lumbar instability/stabilization subgroup. [x]Signs/symptoms consistent with Lumbar mobilization/manipulation subgroup, myotomes and dermatomes intact. Meets manipulation criteria. []Signs/symptoms consistent with Lumbar direction specific/centralization subgroup   []Signs/symptoms consistent with Lumbar traction subgroup     [x]Signs/symptoms consistent with lumbar facet dysfunction   []Signs/symptoms consistent with lumbar stenosis type dysfunction   []Signs/symptoms consistent with nerve root involvement including myotome & dermatome dysfunction   []Signs/symptoms consistent with post-surgical status including: decreased ROM, strength and function.    [x]signs/symptoms consistent with pathology which may benefit from Dry needling     [x]other: Hip internal derangement     Prognosis/Rehab Potential:      []Excellent   [x]Good    []Fair   []Poor    Tolerance of evaluation/treatment:    []Excellent   [x]Good    [x]Fair   []Poor     Physical Therapy Evaluation Complexity Justification  [x] A history of present problem with:  [] no personal factors and/or comorbidities that impact the plan of care;  [x]1-2 personal factors and/or comorbidities that impact the plan of care  []3 personal factors and/or comorbidities that impact the plan of care  [x] An examination of body systems using standardized tests and measures addressing any of the following: body structures and functions (impairments), activity limitations, and/or participation restrictions;:  [] a total of 1-2 or more elements   [] a total of 3 or more elements   [x] a total of 4 or more elements   [x] A clinical

## 2021-04-28 NOTE — FLOWSHEET NOTE
[facility]  Phone: [phone]   Fax: [phone]    Physical Therapy Treatment Note/ Progress Report:     Date:  2021    Patient Name:  Mackenzie Mendenhall    :  1950  MRN: [id number]  Restrictions/Precautions:    Medical/Treatment Diagnosis Information:  · Diagnosis: L Hip Intertrochanteric Bursistis M70.62; M51.36 Lumbar DDD, Lumbar Foraminal Stenosis M48.061  · Treatment Diagnosis: Lumbosacral POstural Malalignments with decreased trunk/hip motion with Limited positions tolerance and pain with transitional movements especially with low levels  Insurance/Certification information:  PT Insurance Information: Medicare; [facility]  Physician Information:  Referring Practitioner: Dr. Heath Avina of care signed (Y/N): []  Yes [x]  No    Date of Patient follow up with Physician:      Progress Report: [x]  Yes Eval  []  No     Date Range for reporting period:  Beginnin2021  Ending:     Progress report due (10 Rx/or 30 days whichever is less): visit #10 or  (date)     Recertification due (POC duration/ or 90 days whichever is less):  2021    Visit # Insurance Allowable Auth required? Date Range   1/15- Medicare [address]  []  Yes  [x]  No      Latex Allergy:  [x]NO      []YES  Preferred Language for Healthcare:   [x]English       []other:    Functional Scale:       Date assessed:    Oswestry: raw score = 59; dysfunction = 26.25%  2021     Pain level:6-7/10     Lumbar MRI results:   CONCLUSION:   1. At L3-4, minimal, grade 1 retrolisthesis. Left foraminal protrusion with thin annular    fissure and low-grade left foraminal stenosis.  Mild to moderate right and mild left facet    hypertrophy with ligamentum flavum thickening. 2. At L4-5, disc space narrowing. Left foraminal protrusion with low-grade left foraminal    stenosis.  Right parasagittal to extraforaminal mixed spondylotic protrusion, with adjacent    endplate hypertrophy.  Mild to moderate right foraminal stenosis with mild abutment of exiting    right L4 nerve root. Right lateral recess stenosis and mild abutment of transiting right L5    nerve root. Mild bilateral facet hypertrophy and ligamentum flavum thickening. Low-grade spinal    stenosis. 3. At L5-S1, left foraminal mixed spondylotic protrusion with adjacent endplate hypertrophy.     Low-grade left foraminal stenosis. Right foraminal mixed spondylotic displacement with adjacent    endplate hypertrophy.  Mild to moderate right foraminal stenosis with abutment of exiting    right L5 nerve root. LE MRI 4/14:  CONCLUSION:   1. Low-grade left hip chondromalacia. 2. Short, thin anterolateral left acetabular labral tear.  Separate posterior labral tear with    a large 5.5 x 1.3 x 1.4 cm paralabral cyst extending posterior to the left acetabulum and deep    to the left gluteus medius muscle. 3. Mild to moderate distal left gluteus minimus tendinosis with few small, thin/low-grade    partial-thickness and interstitial tears.  Low-grade distal left gluteus medius tendon strain,    with an associated 1.5 x 0.4 x 1 cm chronic avulsion fracture fragment near the left greater    trochanter. 4.  Low-grade left hamstring origin tendinosis with mild left ischiofemoral impingement       SUBJECTIVE:  See eval    OBJECTIVE: See eval   Observation:    Test measurements:      RESTRICTIONS/PRECAUTIONS: dizziness, cervical DDD, L hip labral tears, see mRIs above    :     Exercises/Interventions:     Therapeutic Exercise (74598) Resistance / level Sets / Seconds Reps Notes / Cues   Hookline B Hip ER orange 2 8 HEP   Hookline R Hip ER only; L iso orange 1 10 HEP   Hookline Bridges orange 1 12 HEP   Hookline TrA acivation opp GH flex  1 12 HEP   Standing Hip ext/plank multifidus  1 10 L/R HEP                               Therapeutic Activities (63243)       4/28/21 Pt was educated on PT POC, Diagnosis, Prognosis, pathomechanics as well as frequency and duration of scheduling future physical therapy appointments. Time was also taken on this day to answer all patient questions and participation in PT. X8'                                  Neuromuscular Re-ed (28276)                                          Manual Intervention (21874)       Prone PA       GISTM/STM L TFL, distal lumbosacral ps, piriformis, gluteus minimus with HG7 and HG( X8-10 strokes cross friction as well as STM X10' R sidelying    Lumbar Manip       SI Manip       Hip belt mobs       Hip LA distraction L LE  Grade 3-4     MET to correct  L4 FRSR                     Modalities:     Pt. Education:  -pt educated on diagnosis, prognosis and expectations for rehab  -all pt questions were answered    Home Exercise Prorgam:  Access Code: [email or web address]: 04/28/2021Prepared by:  Read Jack   Hooklying Clamshell with Resistance - 1 x daily - 7 x weekly - 3 sets - 10 reps   Hooklying Isometric Clamshell - 1 x daily - 7 x weekly - 2 sets - 10 reps   Supine Bridge with Resistance Band - 1 x daily - 7 x weekly - 2 sets - 10 reps   Prone Hip Extension on Table - 1 x daily - 7 x weekly - 2-3 sets - 10 reps   Supine Alternating Shoulder Flexion - 1 x daily - 7 x weekly - 3 sets - 10 reps      Therapeutic Exercise and NMR EXR  [x] (88111) Provided verbal/tactile cueing for activities related to strengthening, flexibility, endurance, ROM  for improvements in proximal hip and core control with self care, mobility, lifting and ambulation.  [] (59190) Provided verbal/tactile cueing for activities related to improving balance, coordination, kinesthetic sense, posture, motor skill, proprioception  to assist with core control in self care, mobility, lifting, and ambulation.   [] (52853) Therapist is in constant attendance of 2 or more patients providing skilled therapy interventions, but not providing any significant amount of measurable 1-2 muscles (19664):  [] Dry Needling 3+ muscles (969134  [] Group:      [] Other:       OALS:  Patient stated goal: Hip/leg strengthening  [] Progressing: [] Met: [] Not Met: [] Adjusted    Therapist goals for Patient:   Short Term Goals: To be achieved in: 2 weeks  1. Independent in HEP and progression per patient tolerance, in order to prevent re-injury. [] Progressing: [] Met: [] Not Met: [] Adjusted  2. Patient will have a decrease in pain to facilitate improvement in movement, function, and ADLs as indicated by Functional Deficits. [] Progressing: [] Met: [] Not Met: [] Adjusted    Long Term Goals: To be achieved in: 8-10 weeks  1. Disability index score of 20% or less for the SHRUTI to assist with reaching prior level of function. [] Progressing: [] Met: [] Not Met: [] Adjusted  2. Patient will demonstrate increased AROM to WNL, good LS mobility, good hip ROM to allow for proper joint functioning as indicated by patient's ability to perform squatting for IADLS with 00% decrease in pain,  [] Progressing: [] Met: [] Not Met: [] Adjusted  3. Patient will demonstrate an increase in Strength to good proximal hip and core activation to allow for proper functional mobility as indicated by patient' sability to walk on uneven ground/grass without LOB to play with grandkids  [] Progressing: [] Met: [] Not Met: [] Adjusted  4. Patient will return to functional activities including ascending/descending stairs reciprocally without rail X12  without increased symptoms or restriction. [] Progressing: [] Met: [] Not Met: [] Adjusted  5. Patient to be able to  Participate in hiking on mild to moderate hills with  for short 20 minute hikes by discharge with minimal to no pain. [] Progressing: [] Met: [] Not Met: [] Adjusted   Overall Progression Towards Functional goals/ Treatment Progress Update:  [] Patient is progressing as expected towards functional goals listed.     [] Progression is slowed due to complexities/Impairments listed. [] Progression has been slowed due to co-morbidities. [x] Plan just implemented, too soon to assess goals progression <30days   [] Goals require adjustment due to lack of progress  [] Patient is not progressing as expected and requires additional follow up with physician  [] Other    Persisting Functional Limitations/Impairments:  []Sitting []Standing   []Walking []Squatting/bending    []Stairs []ADL's    []Transfers []Reaching  []Housework []Job related tasks  []Driving []Sports/Recreation   []Sleeping []Other:    ASSESSMENT:  See eval  Treatment/Activity Tolerance:  [x] Patient able to complete tx  [] Patient limited by fatique  [] Patient limited by pain  [] Patient limited by other medical complications  [] Other:     Prognosis: [] Good [] Fair  [] Poor    Patient Requires Follow-up: [x] Yes  [] No    PLAN: See eval. PT 2x / week for 8-10 weeks. [] Continue per plan of care [] Alter current plan (see comments)  [x] Plan of care initiated [] Hold pending MD visit [] Discharge    Electronically signed by: Maricruz Lopez PT, MSPT      Note: If patient does not return for scheduled/ recommended follow up visits, this note will serve as a discharge from care along with most recent update on progress.

## 2021-05-11 ENCOUNTER — HOSPITAL ENCOUNTER (OUTPATIENT)
Dept: PHYSICAL THERAPY | Age: 71
Setting detail: THERAPIES SERIES
Discharge: HOME | End: 2021-05-11
Payer: MEDICARE

## 2021-05-11 PROCEDURE — 97140 MANUAL THERAPY 1/> REGIONS: CPT

## 2021-05-11 PROCEDURE — 97112 NEUROMUSCULAR REEDUCATION: CPT

## 2021-05-11 NOTE — FLOWSHEET NOTE
LailaReggie  Phone: [phone]   Fax: [phone]    Physical Therapy Treatment Note/ Progress Report:     Date:  2021    Patient Name:  Yi De La Torre    :  1950  MRN: [id number]  Restrictions/Precautions:    Medical/Treatment Diagnosis Information:  · Diagnosis: L Hip Intertrochanteric Bursistis M70.62; M51.36 Lumbar DDD, Lumbar Foraminal Stenosis M48.061  · Treatment Diagnosis: Lumbosacral POstural Malalignments with decreased trunk/hip motion with Limited positions tolerance and pain with transitional movements especially with low levels  Insurance/Certification information:  PT Insurance Information: Medicare; [facility]  Physician Information:  Referring Practitioner: Dr. Ariana Hayden of care signed (Y/N): []  Yes [x]  No    Date of Patient follow up with Physician:      Progress Report: [x]  Yes Eval  []  No     Date Range for reporting period:  Beginnin2021  Ending:     Progress report due (10 Rx/or 30 days whichever is less): visit #10 or  (date)     Recertification due (POC duration/ or 90 days whichever is less):  2021    Visit # Insurance Allowable Auth required? Date Range   1/15- Medicare [address] [place] [address]  []  Yes  [x]  No      Latex Allergy:  [x]NO      []YES  Preferred Language for Healthcare:   [x]English       []other:    Functional Scale:       Date assessed:    Oswestry: raw score = 59; dysfunction = 26.25%  2021     Pain level:6-7/10     Lumbar MRI results:   CONCLUSION:   1. At L3-4, minimal, grade 1 retrolisthesis. Left foraminal protrusion with thin annular    fissure and low-grade left foraminal stenosis.  Mild to moderate right and mild left facet    hypertrophy with ligamentum flavum thickening. 2. At L4-5, disc space narrowing. Left foraminal protrusion with low-grade left foraminal    stenosis.  Right parasagittal to extraforaminal mixed spondylotic protrusion, with adjacent    endplate hypertrophy.  Mild to moderate right foraminal stenosis with mild abutment of exiting    right L4 nerve root. Right lateral recess stenosis and mild abutment of transiting right L5    nerve root. Mild bilateral facet hypertrophy and ligamentum flavum thickening. Low-grade spinal    stenosis. 3. At L5-S1, left foraminal mixed spondylotic protrusion with adjacent endplate hypertrophy.     Low-grade left foraminal stenosis. Right foraminal mixed spondylotic displacement with adjacent    endplate hypertrophy.  Mild to moderate right foraminal stenosis with abutment of exiting    right L5 nerve root. LE MRI 4/14:  CONCLUSION:   1. Low-grade left hip chondromalacia. 2. Short, thin anterolateral left acetabular labral tear.  Separate posterior labral tear with    a large 5.5 x 1.3 x 1.4 cm paralabral cyst extending posterior to the left acetabulum and deep    to the left gluteus medius muscle. 3. Mild to moderate distal left gluteus minimus tendinosis with few small, thin/low-grade    partial-thickness and interstitial tears.  Low-grade distal left gluteus medius tendon strain,    with an associated 1.5 x 0.4 x 1 cm chronic avulsion fracture fragment near the left greater    trochanter. 4. Low-grade left hamstring origin tendinosis with mild left ischiofemoral impingement       SUBJECTIVE:  Patient reports car ride down to beach aggravated L hip. L hip now more achy then a pinpoint pain at a 3-4/10.       OBJECTIVE: See eval   Observation:    Test measurements:      RESTRICTIONS/PRECAUTIONS: dizziness, cervical DDD, L hip labral tears, see mRIs above    :     Exercises/Interventions:     Therapeutic Exercise (28565) Resistance / level Sets / Seconds Reps Notes / Cues   Hookline B Hip ER lime 2 8 HEP   Hookline R and R Hip ER ;  iso lime 1 10 HEP   Hookline Bridges  1 12 HEP   Hookline TrA acivation opp GH flex  1 12 HEP   Standing Hip ext/plank multifidus  1 10 L/R HEP Therapeutic Activities (36517)       4/28/21 Pt was educated on PT POC, Diagnosis, Prognosis, pathomechanics as well as frequency and duration of scheduling future physical therapy appointments. Time was also taken on this day to answer all patient questions and participation in PT. Rula Gaines'                                  Neuromuscular Re-ed (99302)              Prone hip IR/ER  2 10 L 5/11                        Manual Intervention (09473)       Prone PA       GISTM/STM L TFL, distal lumbosacral ps, piriformis, gluteus minimus, L HS origin,  with  as well as STM X10' R sidelying    Lumbar Manip       SI Manip       Hip belt mobs Prone anterior glide, L hip belt lateral, inferior Grade 3 mobs      Hip LA distraction     MET to correct L2 FRSR, L1 ERSR, T12 FRSL                      Modalities:     Pt. Education:  -pt educated on diagnosis, prognosis and expectations for rehab  -all pt questions were answered    Home Exercise Prorgam:  Access Code: [email or web address]: [email or web address]: 04/28/2021Prepared by: Jacqui Ramseyous   Hooklying Clamshell with Resistance - 1 x daily - 7 x weekly - 3 sets - 10 reps   Hooklying Isometric Clamshell - 1 x daily - 7 x weekly - 2 sets - 10 reps   Supine Bridge with Resistance Band - 1 x daily - 7 x weekly - 2 sets - 10 reps   Prone Hip Extension on Table - 1 x daily - 7 x weekly - 2-3 sets - 10 reps   Supine Alternating Shoulder Flexion - 1 x daily - 7 x weekly - 3 sets - 10 reps      Therapeutic Exercise and NMR EXR  [x] (57432) Provided verbal/tactile cueing for activities related to strengthening, flexibility, endurance, ROM  for improvements in proximal hip and core control with self care, mobility, lifting and ambulation.  [] (96848) Provided verbal/tactile cueing for activities related to improving balance, coordination, kinesthetic sense, posture, motor skill, proprioception  to assist with core control in self care, mobility, lifting, and ambulation.    [] (21149) Therapist is in constant attendance of 2 or more patients providing skilled therapy interventions, but not providing any significant amount of measurable one-on-one time to either patient, for improvements in LE, proximal hip, and core control in self care, mobility, lifting, ambulation and eccentric single leg control. Therapeutic Activities:    [x] (04403 or 76932) Provided verbal/tactile cueing for activities related to improving balance, coordination, kinesthetic sense, posture, motor skill, proprioception and motor activation to allow for proper function  with self care and ADLs  [] (61985) Provided training and instruction to the patient for proper core and proximal hip recruitment and positioning with ambulation re-education     Home Exercise Program:    [x] (23680) Reviewed/Progressed HEP activities related to strengthening, flexibility, endurance, ROM of core, proximal hip and LE for functional self-care, mobility, lifting and ambulation   [] (46783) Reviewed/Progressed HEP activities related to improving balance, coordination, kinesthetic sense, posture, motor skill, proprioception of core, proximal hip and LE for self care, mobility, lifting, and ambulation      Manual Treatments:  PROM / STM / Oscillations-Mobs:  G-I, II, III, IV (PA's, Inf., Post.)  [] (13453) Provided manual therapy to mobilize proximal hip and LS spine soft tissue/joints for the purpose of modulating pain, promoting relaxation,  increasing ROM, reducing/eliminating soft tissue swelling/inflammation/restriction, improving soft tissue extensibility and allowing for proper ROM for normal function with self care, mobility, lifting and ambulation.        Charges:  Timed Code Treatment Minutes: 40   Total Treatment Minutes: 65       [] EVAL - LOW (64129)   [] EVAL - MOD (92417)  [] EVAL - HIGH (32257)  [] RE-EVAL (20609)  [] FD(58291) x   1    [] Ionto  [x] NMR (94032) x       [] Vaso  [x] Manual (44688) x 2     [] Ultrasound  [] TA x 1       [] [facility] Traction (10493)  [] Aquatic Therapy x     [] ES (un) (63361):   [] Home Management Training x  [] ES(attended) (19491)   [] Dry Needling 1-2 muscles (01008):  [] Dry Needling 3+ muscles (419281  [] Group:      [] Other:       OALS:  Patient stated goal: Hip/leg strengthening  [] Progressing: [] Met: [] Not Met: [] Adjusted    Therapist goals for Patient:   Short Term Goals: To be achieved in: 2 weeks  1. Independent in HEP and progression per patient tolerance, in order to prevent re-injury. [] Progressing: [] Met: [] Not Met: [] Adjusted  2. Patient will have a decrease in pain to facilitate improvement in movement, function, and ADLs as indicated by Functional Deficits. [] Progressing: [] Met: [] Not Met: [] Adjusted    Long Term Goals: To be achieved in: 8-10 weeks  1. Disability index score of 20% or less for the SHRUTI to assist with reaching prior level of function. [] Progressing: [] Met: [] Not Met: [] Adjusted  2. Patient will demonstrate increased AROM to WNL, good LS mobility, good hip ROM to allow for proper joint functioning as indicated by patient's ability to perform squatting for IADLS with 48% decrease in pain,  [] Progressing: [] Met: [] Not Met: [] Adjusted  3. Patient will demonstrate an increase in Strength to good proximal hip and core activation to allow for proper functional mobility as indicated by patient' sability to walk on uneven ground/grass without LOB to play with grandkids  [] Progressing: [] Met: [] Not Met: [] Adjusted  4. Patient will return to functional activities including ascending/descending stairs reciprocally without rail X12  without increased symptoms or restriction. [] Progressing: [] Met: [] Not Met: [] Adjusted  5. Patient to be able to  Participate in hiking on mild to moderate hills with  for short 20 minute hikes by discharge with minimal to no pain.     [] Progressing: [] Met: [] Not Met: [] Adjusted   Overall Progression Towards Functional goals/ Treatment Progress Update:  [] Patient is progressing as expected towards functional goals listed. [] Progression is slowed due to complexities/Impairments listed. [] Progression has been slowed due to co-morbidities. [x] Plan just implemented, too soon to assess goals progression <30days   [] Goals require adjustment due to lack of progress  [] Patient is not progressing as expected and requires additional follow up with physician  [] Other    Persisting Functional Limitations/Impairments:  []Sitting []Standing   []Walking []Squatting/bending    []Stairs []ADL's    []Transfers []Reaching  []Housework []Job related tasks  []Driving []Sports/Recreation   []Sleeping []Other:    ASSESSMENT:  Patient demonstrates upper Lumbar rotation to R with decreased L hip anterior glides limiting hip extension and hip ER intialy. She also has decreased L hip rotator flexibility and strength with TP possibly referring pain into L LE and  N/T. Slowly add in hip/core strength as mobility/flexibility imprives. Treatment/Activity Tolerance:  [x] Patient able to complete tx  [] Patient limited by fatique  [] Patient limited by pain  [] Patient limited by other medical complications  [] Other:     Prognosis: [x] Good [] Fair  [] Poor    Patient Requires Follow-up: [x] Yes  [] No    PLAN: See eval. PT 2x / week for 8-10 weeks. [x] Continue per plan of care [] Alter current plan (see comments)  [] Plan of care initiated [] Hold pending MD visit [] Discharge    Electronically signed by: Albert Wise PT, MSPT      Note: If patient does not return for scheduled/ recommended follow up visits, this note will serve as a discharge from care along with most recent update on progress.

## 2021-05-13 ENCOUNTER — HOSPITAL ENCOUNTER (OUTPATIENT)
Dept: PHYSICAL THERAPY | Age: 71
Setting detail: THERAPIES SERIES
Discharge: HOME | End: 2021-05-13
Payer: MEDICARE

## 2021-05-13 PROCEDURE — 97112 NEUROMUSCULAR REEDUCATION: CPT

## 2021-05-13 PROCEDURE — 97110 THERAPEUTIC EXERCISES: CPT

## 2021-05-13 PROCEDURE — 97140 MANUAL THERAPY 1/> REGIONS: CPT

## 2021-05-13 NOTE — FLOWSHEET NOTE
Reggie Ulloa  Phone: [phone]   Fax: [phone]    Physical Therapy Treatment Note/ Progress Report:     Date:  2021    Patient Name:  Ama Krueger    :  1950  MRN: [id number]  Restrictions/Precautions:    Medical/Treatment Diagnosis Information:  · Diagnosis: L Hip Intertrochanteric Bursistis M70.62; M51.36 Lumbar DDD, Lumbar Foraminal Stenosis M48.061  · Treatment Diagnosis: Lumbosacral POstural Malalignments with decreased trunk/hip motion with Limited positions tolerance and pain with transitional movements especially with low levels  Insurance/Certification information:  PT Insurance Information: Medicare; [facility]  Physician Information:  Referring Practitioner: Dr. Leah Brothers of care signed (Y/N): [x]  Yes []  No    Date of Patient follow up with Physician:      Progress Report: []  Yes  [x]  No     Date Range for reporting period:  Beginnin2021  Ending:     Progress report due (10 Rx/or 30 days whichever is less): visit #10 or  (date)     Recertification due (POC duration/ or 90 days whichever is less):  2021    Visit # Insurance Allowable Auth required? Date Range   3/15- Medicare [address]  []  Yes  [x]  No      Latex Allergy:  [x]NO      []YES  Preferred Language for Healthcare:   [x]English       []other:    Functional Scale:       Date assessed:    Oswestry: raw score = 59; dysfunction = 26.25%  2021     Pain level:2/10     Lumbar MRI results:   CONCLUSION:   1. At L3-4, minimal, grade 1 retrolisthesis. Left foraminal protrusion with thin annular    fissure and low-grade left foraminal stenosis.  Mild to moderate right and mild left facet    hypertrophy with ligamentum flavum thickening. 2. At L4-5, disc space narrowing. Left foraminal protrusion with low-grade left foraminal    stenosis.  Right parasagittal to extraforaminal mixed spondylotic protrusion, with adjacent    endplate hypertrophy.  Mild to moderate right foraminal stenosis with mild abutment of exiting    right L4 nerve root. Right lateral recess stenosis and mild abutment of transiting right L5    nerve root. Mild bilateral facet hypertrophy and ligamentum flavum thickening. Low-grade spinal    stenosis. 3. At L5-S1, left foraminal mixed spondylotic protrusion with adjacent endplate hypertrophy.     Low-grade left foraminal stenosis. Right foraminal mixed spondylotic displacement with adjacent    endplate hypertrophy.  Mild to moderate right foraminal stenosis with abutment of exiting    right L5 nerve root. LE MRI 4/14:  CONCLUSION:   1. Low-grade left hip chondromalacia. 2. Short, thin anterolateral left acetabular labral tear.  Separate posterior labral tear with    a large 5.5 x 1.3 x 1.4 cm paralabral cyst extending posterior to the left acetabulum and deep    to the left gluteus medius muscle. 3. Mild to moderate distal left gluteus minimus tendinosis with few small, thin/low-grade    partial-thickness and interstitial tears.  Low-grade distal left gluteus medius tendon strain,    with an associated 1.5 x 0.4 x 1 cm chronic avulsion fracture fragment near the left greater    trochanter. 4.  Low-grade left hamstring origin tendinosis with mild left ischiofemoral impingement       SUBJECTIVE:5/13: OBJECTIVE: See eval   Observation:    Test measurements:      RESTRICTIONS/PRECAUTIONS: dizziness, cervical DDD, L hip labral tears, see mRIs above    :     Exercises/Interventions:     Therapeutic Exercise (19503) Resistance / level Sets / Seconds Reps Notes / Cues   Hookline B Hip ER lime 2 8 HEP   Hookline R and R Hip ER ;  iso lime 1 10 HEP    lime 1 12 HEP    1 12 HEP    1 10 L/R HEP   Clam shell 2 10 R    Leg press B 65# 3 10 5/13                 Therapeutic Activities (04723)       4/28/21 Pt was educated on PT POC, Diagnosis, Prognosis, pathomechanics as well as frequency and duration of scheduling future physical therapy appointments. Time was also taken on this day to answer all patient questions and participation in PT. Neuromuscular Re-ed (78482)              Prone hip IR/ER  2 10 L 5/11   Prone resisted hip IR B Lime green 2 10  5/13   Hookline bridge with neutral pelvis  Lime green 2 10 5/13          Manual Intervention (89356)       Prone PA       GISTM/STM L TFL, distal lumbosacral ps, piriformis, gluteus minimus, L HS origin,  h  as well as STM X10' R sidelying    Lumbar Manip       SI Manip       Hip belt mobs       Hip LA distraction     MET to correct     See manual section                        Modalities:   Manual: 5/13: MET to correct L4 FRSR, L5 FRSL, LEAH sacral torsion  Pt. Education:  -pt educated on diagnosis, prognosis and expectations for rehab  -all pt questions were answered    Home Exercise Prorgam:  Access Code: Talia Rey: [email or web address]/Date: 04/28/2021Prepared by:  Tresea Douse   Hooklying Clamshell with Resistance - 1 x daily - 7 x weekly - 3 sets - 10 reps   Hooklying Isometric Clamshell - 1 x daily - 7 x weekly - 2 sets - 10 reps   Supine Bridge with Resistance Band - 1 x daily - 7 x weekly - 2 sets - 10 reps   Prone Hip Extension on Table - 1 x daily - 7 x weekly - 2-3 sets - 10 reps   Supine Alternating Shoulder Flexion - 1 x daily - 7 x weekly - 3 sets - 10 reps      Therapeutic Exercise and NMR EXR  [x] (70562) Provided verbal/tactile cueing for activities related to strengthening, flexibility, endurance, ROM  for improvements in proximal hip and core control with self care, mobility, lifting and ambulation.  [] (16237) Provided verbal/tactile cueing for activities related to improving balance, coordination, kinesthetic sense, posture, motor skill, proprioception  to assist with core control in self care, mobility, lifting, and ambulation.   [] (26620) Therapist is in constant attendance of 2 or more patients providing skilled therapy interventions, but not providing any significant amount of measurable one-on-one time to either patient, for improvements in LE, proximal hip, and core control in self care, mobility, lifting, ambulation and eccentric single leg control. Therapeutic Activities:    [x] (15054 or 67864) Provided verbal/tactile cueing for activities related to improving balance, coordination, kinesthetic sense, posture, motor skill, proprioception and motor activation to allow for proper function  with self care and ADLs  [] (93631) Provided training and instruction to the patient for proper core and proximal hip recruitment and positioning with ambulation re-education     Home Exercise Program:    [x] (86270) Reviewed/Progressed HEP activities related to strengthening, flexibility, endurance, ROM of core, proximal hip and LE for functional self-care, mobility, lifting and ambulation   [] (37428) Reviewed/Progressed HEP activities related to improving balance, coordination, kinesthetic sense, posture, motor skill, proprioception of core, proximal hip and LE for self care, mobility, lifting, and ambulation      Manual Treatments:  PROM / STM / Oscillations-Mobs:  G-I, II, III, IV (PA's, Inf., Post.)  [] (48794) Provided manual therapy to mobilize proximal hip and LS spine soft tissue/joints for the purpose of modulating pain, promoting relaxation,  increasing ROM, reducing/eliminating soft tissue swelling/inflammation/restriction, improving soft tissue extensibility and allowing for proper ROM for normal function with self care, mobility, lifting and ambulation.        Charges:  Timed Code Treatment Minutes: 45   Total Treatment Minutes: 45       [] EVAL - LOW (43879)   [] EVAL - MOD (52445)  [] EVAL - HIGH (82357)  [] RE-EVAL (77805)  [x] UA(32047) x   1    [] Ionto  [x] NMR (08279) x       [] Vaso  [x] Manual (17956) x 1   [] Ultrasound  [] TA x 1       [] Mech Traction (58461)  [] Aquatic Therapy x     [] ES (un) (86259):   [] Home Management Training x  [] ES(attended) (46456)   [] Dry Needling 1-2 muscles (44226):  [] Dry Needling 3+ muscles (434442  [] Group:      [] Other:       OALS:  Patient stated goal: Hip/leg strengthening  [] Progressing: [] Met: [] Not Met: [] Adjusted    Therapist goals for Patient:   Short Term Goals: To be achieved in: 2 weeks  1. Independent in HEP and progression per patient tolerance, in order to prevent re-injury. [] Progressing: [] Met: [] Not Met: [] Adjusted  2. Patient will have a decrease in pain to facilitate improvement in movement, function, and ADLs as indicated by Functional Deficits. [] Progressing: [] Met: [] Not Met: [] Adjusted    Long Term Goals: To be achieved in: 8-10 weeks  1. Disability index score of 20% or less for the SHRUTI to assist with reaching prior level of function. [] Progressing: [] Met: [] Not Met: [] Adjusted  2. Patient will demonstrate increased AROM to WNL, good LS mobility, good hip ROM to allow for proper joint functioning as indicated by patient's ability to perform squatting for IADLS with 27% decrease in pain,  [] Progressing: [] Met: [] Not Met: [] Adjusted  3. Patient will demonstrate an increase in Strength to good proximal hip and core activation to allow for proper functional mobility as indicated by patient' sability to walk on uneven ground/grass without LOB to play with grandkids  [] Progressing: [] Met: [] Not Met: [] Adjusted  4. Patient will return to functional activities including ascending/descending stairs reciprocally without rail X12  without increased symptoms or restriction. [] Progressing: [] Met: [] Not Met: [] Adjusted  5. Patient to be able to  Participate in hiking on mild to moderate hills with  for short 20 minute hikes by discharge with minimal to no pain.     [] Progressing: [] Met: [] Not Met: [] Adjusted   Overall Progression Towards Functional goals/ Treatment Progress Update:  [] Patient is progressing as expected towards functional goals listed. [] Progression is slowed due to complexities/Impairments listed. [] Progression has been slowed due to co-morbidities. [x] Plan just implemented, too soon to assess goals progression <30days   [] Goals require adjustment due to lack of progress  [] Patient is not progressing as expected and requires additional follow up with physician  [] Other    Persisting Functional Limitations/Impairments:  []Sitting []Standing   []Walking []Squatting/bending    []Stairs []ADL's    []Transfers []Reaching  []Housework []Job related tasks  []Driving []Sports/Recreation   []Sleeping []Other:    ASSESSMENT:  Patient demonstrates improved myofascial mobility overall, with some remaining bands in distal gluteus medius and minimus tendons. Patent demonstrates significant weakness/decreased control ith hip rotators and gluteus medius this date as she fatigues quickly. .  Slowly add in hip/core strength as mobility/flexibility improves. Treatment/Activity Tolerance:  [x] Patient able to complete tx  [] Patient limited by fatique  [] Patient limited by pain  [] Patient limited by other medical complications  [] Other:     Prognosis: [x] Good [] Fair  [] Poor    Patient Requires Follow-up: [x] Yes  [] No    PLAN: See bib. PT 2x / week for 8-10 weeks. [x] Continue per plan of care [] Alter current plan (see comments)  [] Plan of care initiated [] Hold pending MD visit [] Discharge    Electronically signed by: David Mckenzie PT, MSPT      Note: If patient does not return for scheduled/ recommended follow up visits, this note will serve as a discharge from care along with most recent update on progress.

## 2021-05-18 ENCOUNTER — HOSPITAL ENCOUNTER (OUTPATIENT)
Dept: PHYSICAL THERAPY | Age: 71
Setting detail: THERAPIES SERIES
Discharge: HOME | End: 2021-05-18
Payer: MEDICARE

## 2021-05-18 PROCEDURE — 97140 MANUAL THERAPY 1/> REGIONS: CPT

## 2021-05-18 PROCEDURE — 97112 NEUROMUSCULAR REEDUCATION: CPT

## 2021-05-18 NOTE — FLOWSHEET NOTE
daily - 7 x weekly - 2-3 sets - 10 reps   Supine Alternating Shoulder Flexion - 1 x daily - 7 x weekly - 3 sets - 10 reps      Therapeutic Exercise and NMR EXR  [x] (29709) Provided verbal/tactile cueing for activities related to strengthening, flexibility, endurance, ROM  for improvements in proximal hip and core control with self care, mobility, lifting and ambulation.  [] (86281) Provided verbal/tactile cueing for activities related to improving balance, coordination, kinesthetic sense, posture, motor skill, proprioception  to assist with core control in self care, mobility, lifting, and ambulation.   [] (42447) Therapist is in constant attendance of 2 or more patients providing skilled therapy interventions, but not providing any significant amount of measurable one-on-one time to either patient, for improvements in LE, proximal hip, and core control in self care, mobility, lifting, ambulation and eccentric single leg control.      Therapeutic Activities:    [x] (05150 or 97025) Provided verbal/tactile cueing for activities related to improving balance, coordination, kinesthetic sense, posture, motor skill, proprioception and motor activation to allow for proper function  with self care and ADLs  [] (68360) Provided training and instruction to the patient for proper core and proximal hip recruitment and positioning with ambulation re-education     Home Exercise Program:    [x] (44332) Reviewed/Progressed HEP activities related to strengthening, flexibility, endurance, ROM of core, proximal hip and LE for functional self-care, mobility, lifting and ambulation   [] (57327) Reviewed/Progressed HEP activities related to improving balance, coordination, kinesthetic sense, posture, motor skill, proprioception of core, proximal hip and LE for self care, mobility, lifting, and ambulation      Manual Treatments:  PROM / STM / Oscillations-Mobs:  G-I, II, III, IV (PA's, Inf., Post.)  [] (92673) Provided manual therapy to mobilize proximal hip and LS spine soft tissue/joints for the purpose of modulating pain, promoting relaxation,  increasing ROM, reducing/eliminating soft tissue swelling/inflammation/restriction, improving soft tissue extensibility and allowing for proper ROM for normal function with self care, mobility, lifting and ambulation. Charges:  Timed Code Treatment Minutes: 45   Total Treatment Minutes: 45       [] EVAL - LOW (61136)   [] EVAL - MOD (60127)  [] EVAL - HIGH (05809)  [] RE-EVAL (66062)  [] YE(48224) x     [] Ionto  [x] NMR (32794) x 2      [] Vaso  [x] Manual (07464) x 1   [] Ultrasound  [] TA x 1       [] Mech Traction (16797)  [] Aquatic Therapy x     [] ES (un) (23261):   [] Home Management Training x  [] ES(attended) (17213)   [] Dry Needling 1-2 muscles (98668):  [] Dry Needling 3+ muscles (436212  [] Group:      [] Other:       OALS:  Patient stated goal: Hip/leg strengthening  [] Progressing: [] Met: [] Not Met: [] Adjusted    Therapist goals for Patient:   Short Term Goals: To be achieved in: 2 weeks  1. Independent in HEP and progression per patient tolerance, in order to prevent re-injury. [] Progressing: [] Met: [] Not Met: [] Adjusted  2. Patient will have a decrease in pain to facilitate improvement in movement, function, and ADLs as indicated by Functional Deficits. [] Progressing: [] Met: [] Not Met: [] Adjusted    Long Term Goals: To be achieved in: 8-10 weeks  1. Disability index score of 20% or less for the SHRUTI to assist with reaching prior level of function. [] Progressing: [] Met: [] Not Met: [] Adjusted  2. Patient will demonstrate increased AROM to WNL, good LS mobility, good hip ROM to allow for proper joint functioning as indicated by patient's ability to perform squatting for IADLS with 39% decrease in pain,  [] Progressing: [] Met: [] Not Met: [] Adjusted  3.  Patient will demonstrate an increase in Strength to good proximal hip and core activation to allow for proper functional mobility as indicated by patient' sability to walk on uneven ground/grass without LOB to play with grandkids  [] Progressing: [] Met: [] Not Met: [] Adjusted  4. Patient will return to functional activities including ascending/descending stairs reciprocally without rail X12  without increased symptoms or restriction. [] Progressing: [] Met: [] Not Met: [] Adjusted  5. Patient to be able to  Participate in hiking on mild to moderate hills with  for short 20 minute hikes by discharge with minimal to no pain. [] Progressing: [] Met: [] Not Met: [] Adjusted   Overall Progression Towards Functional goals/ Treatment Progress Update:  [] Patient is progressing as expected towards functional goals listed. [] Progression is slowed due to complexities/Impairments listed. [] Progression has been slowed due to co-morbidities. [x] Plan just implemented, too soon to assess goals progression <30days   [] Goals require adjustment due to lack of progress  [] Patient is not progressing as expected and requires additional follow up with physician  [] Other    Persisting Functional Limitations/Impairments:  []Sitting []Standing   []Walking []Squatting/bending    []Stairs []ADL's    []Transfers []Reaching  []Housework []Job related tasks  []Driving []Sports/Recreation   []Sleeping []Other:    ASSESSMENT:  Patient demonstrates improved myofascial mobility overall, with some remaining bands in distal gluteus medius and minimus tendons. Patent demonstrates significant weakness/decreased control hip rAB/rotators and gluteus medius this date as demonstrated by decreased lateral walkout multifidus control. Patient has fair balance on uneven surfaces even in semitandem. Patient will likely benefit from DN in her L gluteals/piiriformis regions on next treatment; DN already approved via inbasket from MD.   . Slowly add in hip/core strength as mobility/flexibility improves.   Treatment/Activity Tolerance:  [x] Patient able to complete tx  [] Patient limited by fatique  [] Patient limited by pain  [] Patient limited by other medical complications  [] Other:     Prognosis: [x] Good [] Fair  [] Poor    Patient Requires Follow-up: [x] Yes  [] No    PLAN: See eval. PT 2x / week for 8-10 weeks. [x] Continue per plan of care [] Alter current plan (see comments)  [] Plan of care initiated [] Hold pending MD visit [] Discharge    Electronically signed by: Sandhya Whaley, PT PT, MSPT      Note: If patient does not return for scheduled/ recommended follow up visits, this note will serve as a discharge from care along with most recent update on progress.

## 2021-05-19 ENCOUNTER — OFFICE VISIT (OUTPATIENT)
Dept: ORTHOPEDIC SURGERY | Age: 71
End: 2021-05-19
Payer: MEDICARE

## 2021-05-19 VITALS — BODY MASS INDEX: 26.33 KG/M2 | WEIGHT: 158 LBS | RESPIRATION RATE: 12 BRPM | HEIGHT: 65 IN

## 2021-05-19 DIAGNOSIS — M24.152 DEGENERATIVE TEAR OF ACETABULAR LABRUM OF LEFT HIP: ICD-10-CM

## 2021-05-19 DIAGNOSIS — M76.892 HIP ABDUCTOR TENDONITIS, LEFT: Primary | ICD-10-CM

## 2021-05-19 PROCEDURE — G8427 DOCREV CUR MEDS BY ELIG CLIN: HCPCS | Performed by: ORTHOPAEDIC SURGERY

## 2021-05-19 PROCEDURE — 4040F PNEUMOC VAC/ADMIN/RCVD: CPT | Performed by: ORTHOPAEDIC SURGERY

## 2021-05-19 PROCEDURE — 1036F TOBACCO NON-USER: CPT | Performed by: ORTHOPAEDIC SURGERY

## 2021-05-19 PROCEDURE — 1090F PRES/ABSN URINE INCON ASSESS: CPT | Performed by: ORTHOPAEDIC SURGERY

## 2021-05-19 PROCEDURE — 3017F COLORECTAL CA SCREEN DOC REV: CPT | Performed by: ORTHOPAEDIC SURGERY

## 2021-05-19 PROCEDURE — G8417 CALC BMI ABV UP PARAM F/U: HCPCS | Performed by: ORTHOPAEDIC SURGERY

## 2021-05-19 PROCEDURE — G8399 PT W/DXA RESULTS DOCUMENT: HCPCS | Performed by: ORTHOPAEDIC SURGERY

## 2021-05-19 PROCEDURE — 99213 OFFICE O/P EST LOW 20 MIN: CPT | Performed by: ORTHOPAEDIC SURGERY

## 2021-05-19 PROCEDURE — 1123F ACP DISCUSS/DSCN MKR DOCD: CPT | Performed by: ORTHOPAEDIC SURGERY

## 2021-05-19 NOTE — PROGRESS NOTES
Chief Complaint  Hip Pain (F/u left hip pain. GTB injection claudetteen 4/14/21. PT referral and patient has completed 4 sessions from 4/28/21 - 5/18/21)      History of Present Illness:  Annelise Vidal is a pleasant 72 y.o. female follow-up left hip abductor tendinitis, degenerative labral tear of the hip, and degenerative disc disease of lumbar spine. Just over a month ago we gave her a cortisone injection into her greater trochanteric bursa by ultrasound. She also started formal physical therapy. She also went to [facility] for vacation and had a good time, although one long bike ride set her back slightly. She has significant relief today due to a combination of physical therapy and injection therapy. She has been doing some dry needling with Theone [place] at the [place] physical therapy office. She has identified a tight piriformis. She just had a session yesterday and had some tightness identified in the area and some numbness in the foot as a result of the treatment however she feels a lot of relief. She no longer has pain at night. She has no pain with walking. Medical History:  Patient's medications, allergies, past medical, surgical, social and family histories were reviewed and updated as appropriate. Pain Assessment  Location of Pain: Other (Comment) (Hip)  Location Modifiers: Left  Severity of Pain: 0 (Can be 6 at times)  Quality of Pain: Sharp  Duration of Pain: A few hours  Frequency of Pain: Intermittent  Aggravating Factors: Other (Comment) (Direct pressure)  Limiting Behavior: Some  Relieving Factors: Rest  Result of Injury: No  Work-Related Injury: No  Are there other pain locations you wish to document?: No  ROS: Review of systems reviewed from Patient History Form completed today and available in the patient's chart under the Media tab.       Pertinent items are noted in HPI  Review of systems reviewed from Patient History Form completed today and available in the patient's chart under the Media tab. Vital Signs:  Resp 12   Ht 5' 5\" (1.651 m)   Wt 158 lb (71.7 kg)   BMI 26.29 kg/m²         Neuro: Alert & oriented x 3,  normal,  no focal deficits noted. Normal affect. Eyes: sclera clear  Ears: Normal external ear  Mouth:  No perioral lesions  Pulm: Respirations unlabored and regular  Pulse: Extremities well perfused. 2+ peripheral pulses. Skin: Warm. No ulcerations. Constitutional: The physical examination finds the patient to be well-developed and well-nourished. The patient is alert and oriented x3 and was cooperative throughout the visit. Hip exam    Hip Examination: left    Skin/Inspection: no skin lesions, cellulitis, or extreme edema in the lower extremities. Standing/Walking: Normal non-antalgic gait, no pelvic tilt, negative Trendelenburg sign. No use of assistive devices    Sitting Exam: 5/5 Hip Flexor Strength, 5/5 Abductor Strength, 5/5 Adductor strength, Negative Straight Leg Raise    Supine Exam: Non tender around the ASIS, AIIS  Flexion arc 0 to 90deg, with no pain or difficulty. Special Tests: Negative Deep Flexion Test, Negative FADIR , no pain with ELHAM, negative resisted sit-up (Athletic Pubalgia). Side Lying Exam: Much less tender at greater trochanter, nontender at the abductor musculature, but not  TFL origin. Abductor side leg raise 5/5 strength. Negative OberTest today. Prone Exam: Negative hip flexion contracture, internal rotation to 25 deg, external rotation to 45 deg. Non-tender at the ischial tuberosity nor proximal hamstring      Diagnostics:  Radiology:     MRI left hip performed on 4/12/2021:  1. Low-grade left hip chondromalacia. 2.  Short, thin anterolateral left acetabular labral tear. Separate posterior labral tear with a large para labral cyst extending posterior to the left acetabulum and deep to the left gluteus medius muscle. This measures 5.5 x 1.3 x 1.4 cm.   3.  Mild to moderate distal left gluteus minimus tendinosis with few small, thin/low-grade partial-thickness and interstitial tears. Low-grade distal left gluteus medius tendon strain, with an associated 1.5 x 0.4 x 1 cm chronic avulsion fracture fragment near the left greater trochanter low-grade left hamstring origin tendinosis with mild left ischiofemoral impingement    MRI lumbar spine performed on 4/12/2021:  1. Minimal grade 1 lateral retrolisthesis at L3-4. Left foraminal protrusion within annular fissure and low-grade left foraminal stenosis. Mild to moderate right and mild left facet hypertrophy with ligamentum flavum thickening. 2.  At L4-5, disc space narrowing. Left foraminal protrusion with left low-grade foraminal stenosis. Right parasagittal to extraforaminal neck spondylotic protrusion, with adjacent endplate hypertrophy. Mild to moderate foraminal stenosis with mild abutment of exiting right L4 nerve root. Right lateral recess stenosis and mild abutment of transsitting right L5 nerve root. Mild bilateral facet hypertrophy and ligamentum flavum thickening. Low-grade spinal stenosis. 3.  At L5-S1, left foraminal neck spondylotic protrusion with adjacent endplate hypertrophy. Low-grade left foraminal stenosis. Right foraminal neck spondylotic displacement with adjacent endplate hypertrophy. Mild to moderate foraminal stenosis with abutment of exiting right L5 nerve root    Assessment: Patient is a 72 y.o. female who has significantly improved with a nonoperative program for her left sided hip spine syndrome symptoms. She has an  MRI diagnosis of abductor disease, partial tear/avulsion, degenerative hip labral tearing, and multilevel DDD in her back with foraminal stenosis at L3/4 left side. Her abductor tendon disease remains a non-operative issue at this point, especially as she is improving.     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 No orders of the defined types were placed in this encounter. Plan:  Frandy Vuong is doing great  . My recommendation for Ms. Letty Perez today is to continue with a maintenance program for her hip and spine conditioning. I would like see her back in 3 months for surveillance. There is always an option for repeat cortisone injection in the bursa should she have recurrence of symptoms, and should her tear not develop into a surgical problem. Low likelihood of that at this point. All the patient's questions were answered while in the clinic. The patient is understanding of all instructions and agrees with the plan. Approximately 30 minutes was spent on patient education and coordinating care. Follow up in: Return in about 3 months (around 8/19/2021). Sincerely,    Kevin Cardenas MD [address] [address] [address] [facility], [facility]  Email: [email or web address]  Office: [phone]    05/19/21  9:15 AM      The encounter with Micheal Garnett was carried out by myself, Dr Paulina Multani, who personally examined the patient and reviewed the plan. This dictation was performed with a verbal recognition program (DRAGON) and it was checked for errors. It is possible that there are still dictated errors within this office note. If so, please bring any errors to my attention for an addendum. All efforts were made to ensure that this office note is accurate.

## 2021-05-20 ENCOUNTER — HOSPITAL ENCOUNTER (OUTPATIENT)
Dept: PHYSICAL THERAPY | Age: 71
Setting detail: THERAPIES SERIES
Discharge: HOME | End: 2021-05-20
Payer: MEDICARE

## 2021-05-20 PROCEDURE — 97110 THERAPEUTIC EXERCISES: CPT

## 2021-05-20 PROCEDURE — 97140 MANUAL THERAPY 1/> REGIONS: CPT

## 2021-05-20 PROCEDURE — 97112 NEUROMUSCULAR REEDUCATION: CPT

## 2021-05-20 NOTE — FLOWSHEET NOTE
Reggie Ulloa  Phone: [phone]   Fax: [phone]    Physical Therapy Treatment Note/ Progress Report:     Date:  2021    Patient Name:  Jj Turner    :  1950  MRN: [id number]  Restrictions/Precautions:    Medical/Treatment Diagnosis Information:  · Diagnosis: L Hip Intertrochanteric Bursistis M70.62; M51.36 Lumbar DDD, Lumbar Foraminal Stenosis M48.061  · Treatment Diagnosis: Lumbosacral POstural Malalignments with decreased trunk/hip motion with Limited positions tolerance and pain with transitional movements especially with low levels  Insurance/Certification information:  PT Insurance Information: Medicare; [facility]  Physician Information:  Referring Practitioner: Dr. Orlando Mccarty of care signed (Y/N): [x]  Yes []  No    Date of Patient follow up with Physician:      Progress Report: []  Yes  [x]  No     Date Range for reporting period:  Beginnin2021  Ending:     Progress report due (10 Rx/or 30 days whichever is less): visit #10 or  (date)     Recertification due (POC duration/ or 90 days whichever is less):  2021    Visit # Insurance Allowable Auth required? Date Range   5/15- Medicare [address]  []  Yes  [x]  No      Latex Allergy:  [x]NO      []YES  Preferred Language for Healthcare:   [x]English       []other:    Functional Scale:       Date assessed:    Oswestry: raw score = 59; dysfunction = 26.25%  2021     Pain level:2/10     Lumbar MRI results:   CONCLUSION:   1. At L3-4, minimal, grade 1 retrolisthesis. Left foraminal protrusion with thin annular    fissure and low-grade left foraminal stenosis.  Mild to moderate right and mild left facet    hypertrophy with ligamentum flavum thickening. 2. At L4-5, disc space narrowing. Left foraminal protrusion with low-grade left foraminal    stenosis.  Right parasagittal to extraforaminal mixed spondylotic protrusion, with adjacent    endplate hypertrophy.  Mild to moderate right foraminal stenosis with mild abutment of exiting    right L4 nerve root. Right lateral recess stenosis and mild abutment of transiting right L5    nerve root. Mild bilateral facet hypertrophy and ligamentum flavum thickening. Low-grade spinal    stenosis. 3. At L5-S1, left foraminal mixed spondylotic protrusion with adjacent endplate hypertrophy.     Low-grade left foraminal stenosis. Right foraminal mixed spondylotic displacement with adjacent    endplate hypertrophy.  Mild to moderate right foraminal stenosis with abutment of exiting    right L5 nerve root. LE MRI 4/14:  CONCLUSION:   1. Low-grade left hip chondromalacia. 2. Short, thin anterolateral left acetabular labral tear.  Separate posterior labral tear with    a large 5.5 x 1.3 x 1.4 cm paralabral cyst extending posterior to the left acetabulum and deep    to the left gluteus medius muscle. 3. Mild to moderate distal left gluteus minimus tendinosis with few small, thin/low-grade    partial-thickness and interstitial tears.  Low-grade distal left gluteus medius tendon strain,    with an associated 1.5 x 0.4 x 1 cm chronic avulsion fracture fragment near the left greater    trochanter. 4. Low-grade left hamstring origin tendinosis with mild left ischiofemoral impingement       SUBJECTIVE:5/20: Patient states she saw MD who wants her to continue PT as she is getting pain relief. Patient wants to start DN next week as going on a trip tomorrow.     5/13: OBJECTIVE:   Test measurements:  5/18: L5 FRSR, ROR sacral torsion    RESTRICTIONS/PRECAUTIONS: dizziness, cervical DDD, L hip labral tears, see mRIs above    :     Exercises/Interventions:     Therapeutic Exercise (77919) Resistance / level Sets / Seconds Reps Notes / Cues   Hookline B Hip ER lime 2 8 HEP   Hookline R and R Hip ER ;  iso lime 1 10 HEP    lime 1 12 HEP    1 12 HEP    1 10 L/R HEP   Clam shell 2 10 R    Leg press Quantum B squats  70#    R Single leg   L Single Leg  3      1  1 10      10  10 5/13   Leg Quantuim Leg Curl 30#  Leg Ext 20#             Therapeutic Activities (31255)       4/28/21 Pt was educated on PT POC, Diagnosis, Prognosis, pathomechanics as well as frequency and duration of scheduling future physical therapy appointments. Time was also taken on this day to answer all patient questions and participation in PT. Neuromuscular Re-ed (91998)              Prone hip IR/ER  2 10 L 5/11   Prone resisted hip IR B Lime green 2 10  5/13   Hookline bridge with neutral pelvis  Lime green 2 10 5/13   Total Gyn B squats    Lateral foot taps 2    1 10    10 L/R 5/18   Standing B GH Ext Blue2 10 5/18   Standing B Rows Blue2 10 5/18    1 10 5/18   Paloff Press  Lime Green 1 10 5/20                        Manual Intervention (12429)       Prone PA       GISTM/STM L TFL, distal lumbosacral ps, piriformis, gluteus minimus, L HS origin,  h  as well as STM X10' R sidelying    Lumbar Manip       SI Manip       Hip belt mobs       Hip LA distraction     MET to correct     See manual section                        Modalities:   Manual:   5/20: MET to correct L5FRSR, LOL sacral torsion  5/18: MET to correct L5 FRSR, ROR sacral torsion, R SI posterior innominate SI, L SI anterior innominate SI  5/13: MET to correct L4 FRSR, L5 FRSL, LEAH sacral torsion  Pt. Education:  -pt educated on diagnosis, prognosis and expectations for rehab  -all pt questions were answered    Home Exercise Prorgam:  Access Code: [email or web address]  URL: [email or web address]  Date: 05/18/2021  Prepared by: [facility]    Exercises  Anti-Rotation Sidestepping with Resistance - 1 x daily - 7 x weekly - 10 reps - 3 sets  Standing Row with Resistance - 1 x daily - 7 x weekly - 10 reps - 3 sets  Shoulder Extension with Resistance - 1 x daily - 7 x weekly - 10 reps - 3 sets  Access Code: Kevin Orr: [employer]/Date: 04/28/2021Prepared by: Ruthie Soda   Hooklying Clamshell with Resistance - 1 x daily - 7 x weekly - 3 sets - 10 reps   Hooklying Isometric Clamshell - 1 x daily - 7 x weekly - 2 sets - 10 reps   Supine Bridge with Resistance Band - 1 x daily - 7 x weekly - 2 sets - 10 reps   Prone Hip Extension on Table - 1 x daily - 7 x weekly - 2-3 sets - 10 reps   Supine Alternating Shoulder Flexion - 1 x daily - 7 x weekly - 3 sets - 10 reps      Therapeutic Exercise and NMR EXR  [x] (69396) Provided verbal/tactile cueing for activities related to strengthening, flexibility, endurance, ROM  for improvements in proximal hip and core control with self care, mobility, lifting and ambulation.  [] (77205) Provided verbal/tactile cueing for activities related to improving balance, coordination, kinesthetic sense, posture, motor skill, proprioception  to assist with core control in self care, mobility, lifting, and ambulation.   [] (54241) Therapist is in constant attendance of 2 or more patients providing skilled therapy interventions, but not providing any significant amount of measurable one-on-one time to either patient, for improvements in LE, proximal hip, and core control in self care, mobility, lifting, ambulation and eccentric single leg control.      Therapeutic Activities:    [x] (82195 or 03277) Provided verbal/tactile cueing for activities related to improving balance, coordination, kinesthetic sense, posture, motor skill, proprioception and motor activation to allow for proper function  with self care and ADLs  [] (08772) Provided training and instruction to the patient for proper core and proximal hip recruitment and positioning with ambulation re-education     Home Exercise Program:    [x] (82638) Reviewed/Progressed HEP activities related to strengthening, flexibility, endurance, ROM of core, proximal hip and LE for functional self-care, mobility, lifting and ambulation   [] (22540) Reviewed/Progressed HEP activities related to improving balance, coordination, kinesthetic sense, posture, motor skill, proprioception of core, proximal hip and LE for self care, mobility, lifting, and ambulation      Manual Treatments:  PROM / STM / Oscillations-Mobs:  G-I, II, III, IV (PA's, Inf., Post.)  [] (15574) Provided manual therapy to mobilize proximal hip and LS spine soft tissue/joints for the purpose of modulating pain, promoting relaxation,  increasing ROM, reducing/eliminating soft tissue swelling/inflammation/restriction, improving soft tissue extensibility and allowing for proper ROM for normal function with self care, mobility, lifting and ambulation. Charges:  Timed Code Treatment Minutes: 43   Total Treatment Minutes: 43       [] EVAL - LOW (81377)   [] EVAL - MOD (05629)  [] EVAL - HIGH (46403)  [] RE-EVAL (90517)  [x] VI(88006) x  1   [] Ionto  [x] NMR (19086) x 1     [] Vaso  [x] Manual (31999) x 1   [] Ultrasound  [] TA x 1       [] Mech Traction (09427)  [] Aquatic Therapy x     [] ES (un) (95302):   [] Home Management Training x  [] ES(attended) (56316)   [] Dry Needling 1-2 muscles (44206):  [] Dry Needling 3+ muscles (788644  [] Group:      [] Other:       OALS:  Patient stated goal: Hip/leg strengthening  [] Progressing: [] Met: [] Not Met: [] Adjusted    Therapist goals for Patient:   Short Term Goals: To be achieved in: 2 weeks  1. Independent in HEP and progression per patient tolerance, in order to prevent re-injury. [] Progressing: [] Met: [] Not Met: [] Adjusted  2. Patient will have a decrease in pain to facilitate improvement in movement, function, and ADLs as indicated by Functional Deficits. [] Progressing: [] Met: [] Not Met: [] Adjusted    Long Term Goals: To be achieved in: 8-10 weeks  1. Disability index score of 20% or less for the SHRUTI to assist with reaching prior level of function. [] Progressing: [] Met: [] Not Met: [] Adjusted  2.  Patient will demonstrate increased AROM to WNL, good LS mobility, good hip ROM to allow for proper joint functioning as indicated by patient's ability to perform squatting for IADLS with 03% decrease in pain,  [] Progressing: [] Met: [] Not Met: [] Adjusted  3. Patient will demonstrate an increase in Strength to good proximal hip and core activation to allow for proper functional mobility as indicated by patient' sability to walk on uneven ground/grass without LOB to play with grandkids  [] Progressing: [] Met: [] Not Met: [] Adjusted  4. Patient will return to functional activities including ascending/descending stairs reciprocally without rail X12  without increased symptoms or restriction. [] Progressing: [] Met: [] Not Met: [] Adjusted  5. Patient to be able to  Participate in hiking on mild to moderate hills with  for short 20 minute hikes by discharge with minimal to no pain. [] Progressing: [] Met: [] Not Met: [] Adjusted   Overall Progression Towards Functional goals/ Treatment Progress Update:  [] Patient is progressing as expected towards functional goals listed. [] Progression is slowed due to complexities/Impairments listed. [] Progression has been slowed due to co-morbidities. [x] Plan just implemented, too soon to assess goals progression <30days   [] Goals require adjustment due to lack of progress  [] Patient is not progressing as expected and requires additional follow up with physician  [] Other    Persisting Functional Limitations/Impairments:  []Sitting []Standing   []Walking []Squatting/bending    []Stairs []ADL's    []Transfers []Reaching  []Housework []Job related tasks  []Driving []Sports/Recreation   []Sleeping []Other:    ASSESSMENT:  Patient able to tolerate addition of LE resistive nautilus machines without pain this date. Patient demonstrates improved myofascial mobility overall, with some remaining bands in piriformis, distal gluteus medius and minimus tendons.   Patent demonstrates significant weakness/decreased control hip rAB/rotators and gluteus medius this date as demonstrated by decreased lateral walkout multifidus control. Patient will likely benefit from DN in her L gluteals/piiriformis regions on next treatment; DN already approved via inbasket from MD.     Slowly add in hip/core strength as mobility/flexibility improves. Treatment/Activity Tolerance:  [x] Patient able to complete tx  [] Patient limited by fatique  [] Patient limited by pain  [] Patient limited by other medical complications  [] Other:     Prognosis: [x] Good [] Fair  [] Poor    Patient Requires Follow-up: [x] Yes  [] No    PLAN: See eval. PT 2x / week for 8-10 weeks. [x] Continue per plan of care [] Alter current plan (see comments)  [] Plan of care initiated [] Hold pending MD visit [] Discharge    Electronically signed by: Silvestre Black, PT PT, MSPT      Note: If patient does not return for scheduled/ recommended follow up visits, this note will serve as a discharge from care along with most recent update on progress.

## 2021-05-25 ENCOUNTER — HOSPITAL ENCOUNTER (OUTPATIENT)
Dept: PHYSICAL THERAPY | Age: 71
Setting detail: THERAPIES SERIES
Discharge: HOME | End: 2021-05-25
Payer: MEDICARE

## 2021-05-25 PROCEDURE — 97140 MANUAL THERAPY 1/> REGIONS: CPT

## 2021-05-25 PROCEDURE — 97112 NEUROMUSCULAR REEDUCATION: CPT

## 2021-05-25 PROCEDURE — 97110 THERAPEUTIC EXERCISES: CPT

## 2021-05-25 NOTE — FLOWSHEET NOTE
Reggie Dorantes  Phone: [phone]   Fax: [phone]    Physical Therapy Treatment Note/ Progress Report:     Date:  2021    Patient Name:  Nilay Bunn    :  1950  MRN: [id number]  Restrictions/Precautions:    Medical/Treatment Diagnosis Information:  · Diagnosis: L Hip Intertrochanteric Bursistis M70.62; M51.36 Lumbar DDD, Lumbar Foraminal Stenosis M48.061  · Treatment Diagnosis: Lumbosacral POstural Malalignments with decreased trunk/hip motion with Limited positions tolerance and pain with transitional movements especially with low levels  Insurance/Certification information:  PT Insurance Information: Medicare; [facility]  Physician Information:  Referring Practitioner: Dr. Lazara Cifuentes of care signed (Y/N): [x]  Yes []  No    Date of Patient follow up with Physician:      Progress Report: []  Yes  [x]  No     Date Range for reporting period:  Beginnin2021  Ending:     Progress report due (10 Rx/or 30 days whichever is less): visit #10 or 3/86 (date)     Recertification due (POC duration/ or 90 days whichever is less):  2021    Visit # Insurance Allowable Auth required? Date Range   6/15- Medicare [address]  []  Yes  [x]  No      Latex Allergy:  [x]NO      []YES  Preferred Language for Healthcare:   [x]English       []other:    Functional Scale:       Date assessed:    Oswestry: raw score = 59; dysfunction = 26.25%  2021     Pain level:3/10     Lumbar MRI results:   CONCLUSION:   1. At L3-4, minimal, grade 1 retrolisthesis. Left foraminal protrusion with thin annular    fissure and low-grade left foraminal stenosis.  Mild to moderate right and mild left facet    hypertrophy with ligamentum flavum thickening. 2. At L4-5, disc space narrowing. Left foraminal protrusion with low-grade left foraminal    stenosis.  Right parasagittal to extraforaminal mixed spondylotic protrusion, with adjacent    endplate hypertrophy.  Mild to moderate right foraminal stenosis with mild abutment of exiting    right L4 nerve root. Right lateral recess stenosis and mild abutment of transiting right L5    nerve root. Mild bilateral facet hypertrophy and ligamentum flavum thickening. Low-grade spinal    stenosis. 3. At L5-S1, left foraminal mixed spondylotic protrusion with adjacent endplate hypertrophy.     Low-grade left foraminal stenosis. Right foraminal mixed spondylotic displacement with adjacent    endplate hypertrophy.  Mild to moderate right foraminal stenosis with abutment of exiting    right L5 nerve root. LE MRI 4/14:  CONCLUSION:   1. Low-grade left hip chondromalacia. 2. Short, thin anterolateral left acetabular labral tear.  Separate posterior labral tear with    a large 5.5 x 1.3 x 1.4 cm paralabral cyst extending posterior to the left acetabulum and deep    to the left gluteus medius muscle. 3. Mild to moderate distal left gluteus minimus tendinosis with few small, thin/low-grade    partial-thickness and interstitial tears.  Low-grade distal left gluteus medius tendon strain,    with an associated 1.5 x 0.4 x 1 cm chronic avulsion fracture fragment near the left greater    trochanter. 4. Low-grade left hamstring origin tendinosis with mild left ischiofemoral impingement       SUBJECTIVE  5/25: Patient reports that the car ride up to [facility] was fine. After coming back from [facility] the next day, her whole body was a 5/10. She fatigued after walking a couple hours on Friday night. Patient also states she was working I her flower beds and sitting \"funny\" yesterday.    :5/20: Patient states she saw MD who wants her to continue PT as she is getting pain relief. Patient wants to start DN next week as going on a trip tomorrow.     5/13: OBJECTIVE:   Test measurements: 5/25:L5 FRSL, LOL sacral torsion, R innominate Upslip    5/18: L5 FRSR, ROR sacral torsion    RESTRICTIONS/PRECAUTIONS: dizziness, cervical DDD, L hip labral tears, see mRIs above    :     Exercises/Interventions:     Therapeutic Exercise (99488) Resistance / level Sets / Seconds Reps Notes / Cues   lime 2 8 HEP   lime 1 10 HEP    lime 1 12 HEP    1 12 HEP    1 10 L/R HEP   Clam shell 2 10 R    L B squats  70#    R Single leg   L Single Leg  3      1  1 10      10  10 5/13   Standing HS strtech  30\" 2 L?R     Nustep Level 1 5'                   L Leg Curl 30#  Leg Ext 20#             Therapeutic Activities (53357)       4/28/21 Pt was educated on PT POC, Diagnosis, Prognosis, pathomechanics as well as frequency and duration of scheduling future physical therapy appointments. Time was also taken on this day to answer all patient questions and participation in PT. Neuromuscular Re-ed (71881)              Prone hip IR/ER  2 10 L 5/11   Prone resisted hip IR B 2 10  5/13 5/13   Total Gyn 2    1 10    10 L/R 5/18   Standing B GH Ext Blue2 10 5/18   Standing B Rows Blue2 10 5/18    1 10 5/18    1 10 5/20   Lateral step-ups  4\" 2 10 5/25   Hip Hikes 4\" 2 10 5/25   Standing Balance Airex:   Stagger stance L/R    Head turns with stagger stance 20\"          1 2 L/R          8 L/R 5/25   Manual Intervention (28055)       Prone PA       GISTM/STM L TFL, distal lumbosacral ps, piriformis, gluteus minimus, L HS origin,  h  as well as STM X10' R sidelying    Lumbar Manip       SI Manip       Hip belt mobs       Hip LA distraction R LE     MET to correct     See manual section                        Modalities:   Manual:   5/20: After R Leg pull to correct R innominate upslip, MET to correct L5FRSR, LOL sacral torsionR SI posterior innominate SI, L SI anterior innominate SI  5/18: MET to correct L5 FRSR, ROR sacral torsion, R SI posterior innominate SI, L SI anterior innominate SI  5/13: MET to correct L4 FRSR, L5 FRSL, LEAH sacral torsion  Pt.  Education:  -pt educated on diagnosis, prognosis and expectations for rehab  -all pt questions were answered    Home Exercise Prorgam:  Access Code: [email or web address]  URL: [email or web address]  Date: 05/18/2021  [facility] by: [facility]    Exercises  Anti-Rotation Sidestepping with Resistance - 1 x daily - 7 x weekly - 10 reps - 3 sets  Standing Row with Resistance - 1 x daily - 7 x weekly - 10 reps - 3 sets  Shoulder Extension with Resistance - 1 x daily - 7 x weekly - 10 reps - 3 sets  Access Code: Ray Cunninghamrs: Simple[employer]/Date: 04/28/2021Prepared by: Michelle Minaya   Hooklying Clamshell with Resistance - 1 x daily - 7 x weekly - 3 sets - 10 reps   Hooklying Isometric Clamshell - 1 x daily - 7 x weekly - 2 sets - 10 reps   Supine Bridge with Resistance Band - 1 x daily - 7 x weekly - 2 sets - 10 reps   Prone Hip Extension on Table - 1 x daily - 7 x weekly - 2-3 sets - 10 reps   Supine Alternating Shoulder Flexion - 1 x daily - 7 x weekly - 3 sets - 10 reps      Therapeutic Exercise and NMR EXR  [x] (80469) Provided verbal/tactile cueing for activities related to strengthening, flexibility, endurance, ROM  for improvements in proximal hip and core control with self care, mobility, lifting and ambulation.  [] (20330) Provided verbal/tactile cueing for activities related to improving balance, coordination, kinesthetic sense, posture, motor skill, proprioception  to assist with core control in self care, mobility, lifting, and ambulation.   [] (98373) Therapist is in constant attendance of 2 or more patients providing skilled therapy interventions, but not providing any significant amount of measurable one-on-one time to either patient, for improvements in LE, proximal hip, and core control in self care, mobility, lifting, ambulation and eccentric single leg control.      Therapeutic Activities:    [x] (10135 or 96803) Provided verbal/tactile cueing for activities related to improving balance, coordination, kinesthetic sense, posture, motor skill, proprioception and motor activation to allow for proper function  with self care and ADLs  [] (14179) Provided training and instruction to the patient for proper core and proximal hip recruitment and positioning with ambulation re-education     Home Exercise Program:    [x] (76671) Reviewed/Progressed HEP activities related to strengthening, flexibility, endurance, ROM of core, proximal hip and LE for functional self-care, mobility, lifting and ambulation   [] (43881) Reviewed/Progressed HEP activities related to improving balance, coordination, kinesthetic sense, posture, motor skill, proprioception of core, proximal hip and LE for self care, mobility, lifting, and ambulation      Manual Treatments:  PROM / STM / Oscillations-Mobs:  G-I, II, III, IV (PA's, Inf., Post.)  [x] (84645) Provided manual therapy to mobilize proximal hip and LS spine soft tissue/joints for the purpose of modulating pain, promoting relaxation,  increasing ROM, reducing/eliminating soft tissue swelling/inflammation/restriction, improving soft tissue extensibility and allowing for proper ROM for normal function with self care, mobility, lifting and ambulation. Charges:  Timed Code Treatment Minutes: 43   Total Treatment Minutes: 43       [] EVAL - LOW (42771)   [] EVAL - MOD (69149)  [] EVAL - HIGH (05341)  [] RE-EVAL (33777)  [x] ZO(23684) x  1   [] Ionto  [x] NMR (48824) x 1     [] Vaso  [x] Manual (19072) x 1   [] Ultrasound  [] TA x 1       [] Mech Traction (54733)  [] Aquatic Therapy x     [] ES (un) (30203):   [] Home Management Training x  [] ES(attended) (76097)   [] Dry Needling 1-2 muscles (54408):  [] Dry Needling 3+ muscles (030977  [] Group:      [] Other:       OALS:  Patient stated goal: Hip/leg strengthening  [] Progressing: [] Met: [] Not Met: [] Adjusted    Therapist goals for Patient:   Short Term Goals: To be achieved in: 2 weeks  1. Independent in HEP and progression per patient tolerance, in order to prevent re-injury.    [] Progressing: [] Met: [] Not Met: [] Adjusted  2. Patient will have a decrease in pain to facilitate improvement in movement, function, and ADLs as indicated by Functional Deficits. [] Progressing: [] Met: [] Not Met: [] Adjusted    Long Term Goals: To be achieved in: 8-10 weeks  1. Disability index score of 20% or less for the SHRUTI to assist with reaching prior level of function. [] Progressing: [] Met: [] Not Met: [] Adjusted  2. Patient will demonstrate increased AROM to WNL, good LS mobility, good hip ROM to allow for proper joint functioning as indicated by patient's ability to perform squatting for IADLS with 84% decrease in pain,  [] Progressing: [] Met: [] Not Met: [] Adjusted  3. Patient will demonstrate an increase in Strength to good proximal hip and core activation to allow for proper functional mobility as indicated by patient' sability to walk on uneven ground/grass without LOB to play with grandkids  [] Progressing: [] Met: [] Not Met: [] Adjusted  4. Patient will return to functional activities including ascending/descending stairs reciprocally without rail X12  without increased symptoms or restriction. [] Progressing: [] Met: [] Not Met: [] Adjusted  5. Patient to be able to  Participate in hiking on mild to moderate hills with  for short 20 minute hikes by discharge with minimal to no pain. [] Progressing: [] Met: [] Not Met: [] Adjusted   Overall Progression Towards Functional goals/ Treatment Progress Update:  [] Patient is progressing as expected towards functional goals listed. [] Progression is slowed due to complexities/Impairments listed. [] Progression has been slowed due to co-morbidities.   [x] Plan just implemented, too soon to assess goals progression <30days   [] Goals require adjustment due to lack of progress  [] Patient is not progressing as expected and requires additional follow up with physician  [] Other    Persisting Functional Limitations/Impairments:  []Sitting []Standing   []Walking []Squatting/bending    []Stairs []ADL's    []Transfers []Reaching  []Housework []Job related tasks  []Driving []Sports/Recreation   []Sleeping []Other:    ASSESSMENT:  Patient had pelvic asymmetry this date with a R innominate upslip as well as L5/S1 rotations/torsions. Patient fatigued with addition of standing CKC exercises this date. Patient still wishes to pursue dry needling however has another road trip later tofday. able to tolerate addition of LE resistive nautilus machines without pain this date. Patient demonstrates improved myofascial mobility overall, with some remaining bands in piriformis, distal gluteus medius and minimus tendons. Patent demonstrates significant weakness/decreased control hip rAB/rotators and gluteus medius this date as demonstrated by decreased lateral walkout multifidus control. Patient will likely benefit from DN in her L gluteals/piiriformis regions on next treatment; DN already approved via inbasket from MD.     Slowly add in hip/core strength as mobility/flexibility improves. Treatment/Activity Tolerance:  [x] Patient able to complete tx  [] Patient limited by fatique  [] Patient limited by pain  [] Patient limited by other medical complications  [] Other:     Prognosis: [x] Good [] Fair  [] Poor    Patient Requires Follow-up: [x] Yes  [] No    PLAN: See bib. PT 2x / week for 8-10 weeks. [x] Continue per plan of care [] Alter current plan (see comments)  [] Plan of care initiated [] Hold pending MD visit [] Discharge    Electronically signed by: Maryann Estrella, PT PT, MSPT      Note: If patient does not return for scheduled/ recommended follow up visits, this note will serve as a discharge from care along with most recent update on progress.

## 2021-05-27 ENCOUNTER — HOSPITAL ENCOUNTER (OUTPATIENT)
Dept: PHYSICAL THERAPY | Age: 71
Setting detail: THERAPIES SERIES
Discharge: HOME | End: 2021-05-27
Payer: MEDICARE

## 2021-05-27 PROCEDURE — 97140 MANUAL THERAPY 1/> REGIONS: CPT

## 2021-05-27 PROCEDURE — 20561 NDL INSJ W/O NJX 3+ MUSC: CPT

## 2021-05-27 NOTE — FLOWSHEET NOTE
Reggie Ulloa  Phone: [phone]   Fax: [phone]    Physical Therapy Treatment Note/ Progress Report:     Date:  2021    Patient Name:  Wallace Astorga    :  1950  MRN: [id number]  Restrictions/Precautions:    Medical/Treatment Diagnosis Information:  · Diagnosis: L Hip Intertrochanteric Bursistis M70.62; M51.36 Lumbar DDD, Lumbar Foraminal Stenosis M48.061  · Treatment Diagnosis: Lumbosacral POstural Malalignments with decreased trunk/hip motion with Limited positions tolerance and pain with transitional movements especially with low levels  Insurance/Certification information:  PT Insurance Information: Medicare; [facility]  Physician Information:  Referring Practitioner: Dr. Mery Pandya of care signed (Y/N): [x]  Yes []  No    Date of Patient follow up with Physician:      Progress Report: []  Yes  [x]  No     Date Range for reporting period:  Beginnin2021  Ending:     Progress report due (10 Rx/or 30 days whichever is less): visit #10 or 3/12 (date)     Recertification due (POC duration/ or 90 days whichever is less):  2021    Visit # Insurance Allowable Auth required? Date Range   7/15- Medicare [address]  []  Yes  [x]  No      Latex Allergy:  [x]NO      []YES  Preferred Language for Healthcare:   [x]English       []other:    Functional Scale:       Date assessed:    Oswestry: raw score = 59; dysfunction = 26.25%  2021     Pain level:3/10     Lumbar MRI results:   CONCLUSION:   1. At L3-4, minimal, grade 1 retrolisthesis. Left foraminal protrusion with thin annular    fissure and low-grade left foraminal stenosis.  Mild to moderate right and mild left facet    hypertrophy with ligamentum flavum thickening. 2. At L4-5, disc space narrowing. Left foraminal protrusion with low-grade left foraminal    stenosis.  Right parasagittal to extraforaminal mixed spondylotic protrusion, with adjacent    endplate hypertrophy.  Mild to moderate right foraminal stenosis with mild abutment of exiting    right L4 nerve root. Right lateral recess stenosis and mild abutment of transiting right L5    nerve root. Mild bilateral facet hypertrophy and ligamentum flavum thickening. Low-grade spinal    stenosis. 3. At L5-S1, left foraminal mixed spondylotic protrusion with adjacent endplate hypertrophy.     Low-grade left foraminal stenosis. Right foraminal mixed spondylotic displacement with adjacent    endplate hypertrophy.  Mild to moderate right foraminal stenosis with abutment of exiting    right L5 nerve root. LE MRI 4/14:  CONCLUSION:   1. Low-grade left hip chondromalacia. 2. Short, thin anterolateral left acetabular labral tear.  Separate posterior labral tear with    a large 5.5 x 1.3 x 1.4 cm paralabral cyst extending posterior to the left acetabulum and deep    to the left gluteus medius muscle. 3. Mild to moderate distal left gluteus minimus tendinosis with few small, thin/low-grade    partial-thickness and interstitial tears.  Low-grade distal left gluteus medius tendon strain,    with an associated 1.5 x 0.4 x 1 cm chronic avulsion fracture fragment near the left greater    trochanter. 4. Low-grade left hamstring origin tendinosis with mild left ischiofemoral impingement       SUBJECTIVE  5/25: Patient reports that the car ride up to [place] was fine. After coming back from [place] the next day, her whole body was a 5/10. She fatigued after walking a couple hours on Friday night. Patient also states she was working I her flower beds and sitting \"funny\" yesterday.    :5/20: Patient states she saw MD who wants her to continue PT as she is getting pain relief. Patient wants to start DN next week as going on a trip tomorrow.     5/13: OBJECTIVE:   Test measurements: 5/25:L5 FRSL, LOL sacral torsion, R innominate [place]    5/18: L5 FRSR, ROR sacral torsion    RESTRICTIONS/PRECAUTIONS: dizziness, cervical DDD, L hip labral tears, see mRIs above    :     Exercises/Interventions:     Therapeutic Exercise (28925) Resistance / level Sets / Seconds Reps Notes / Cues   lime 2 8 HEP   lime 1 10 HEP    lime 1 12 HEP    1 12 HEP    1 10 L/R HEP   Clam shell 2 10 R    L B squats  70#    R Single leg   L Single Leg  3      1  1 10      10  10 5/13   Standing HS strtech  30\" 2 L?R     Nustep Level 1 5'                   L Leg Curl 30#  Leg Ext 20#             Therapeutic Activities (70647)       4/28/21 Pt was educated on PT POC, Diagnosis, Prognosis, pathomechanics as well as frequency and duration of scheduling future physical therapy appointments. Time was also taken on this day to answer all patient questions and participation in PT. Neuromuscular Re-ed (64067)               2 10 L 5/11   2 10  5/13 5/13   2    1 10    10 L/R 5/18   Blue2 10 5/18   Blue2 10 5/18   1 10 5/18    1 10 5/20   4\" 2 10 5/25   4\" 2 10 5/25   Airex:   Stagger stance L/R    Head turns with stagger stance 20\"          1 2 L/R          8 L/R 5/25   Manual Intervention (75063)       Prone PA       GISTM/STM L TFL, distal lumbosacral ps, piriformis, gluteus minimus, L HS origin,  h  as well as STM X10' R sidelying    Lumbar Manip       SI Manip       Hip belt mobs       Hip LA distraction R LE          See manual section                        Dry needling manual therapy: consisted on the placement of 5 needles in the following muscles:  gluteus chela  gluteus medius, TFL. A 50-60 mm needle was inserted, piston, rotated, and coned to produce intramuscular mobilization as well as piriformis, gluteus minimus with a 75 mm needle. .  These techniques were used to restore functional range of motion, reduce muscle spasm and induce healing in the corresponding musculature. (87054)  Clean Technique was utilized today while applying Dry needling treatment.   The treatment sites where cleaned with 70% solution of isopropyl alcohol . The PT washed their hands and utilized treatment gloves along with hand  prior to inserting the needles. All needles where removed and discarded in the appropriate sharps container. Modalities:   Manual:   5/20: After R Leg pull to correct R innominate upslip, MET to correct L5FRSR, LOL sacral torsionR SI posterior innominate SI, L SI anterior innominate SI  5/18: MET to correct L5 FRSR, ROR sacral torsion, R SI posterior innominate SI, L SI anterior innominate SI  5/13: MET to correct L4 FRSR, L5 FRSL, LEAH sacral torsion  Pt. Education:  -pt educated on diagnosis, prognosis and expectations for rehab  -all pt questions were answered    Home Exercise Prorgam:  Access Code: [email or web address]  URL: [email or web address]  Date: 05/18/2021  Prepared by: [facility]    Exercises  Anti-Rotation Sidestepping with Resistance - 1 x daily - 7 x weekly - 10 reps - 3 sets  Standing Row with Resistance - 1 x daily - 7 x weekly - 10 reps - 3 sets  Shoulder Extension with Resistance - 1 x daily - 7 x weekly - 10 reps - 3 sets  Access Code: Tiffany Arrow: [email or web address]/Date: 04/28/2021Prepared by:  Ruthie Soda   Hooklying Clamshell with Resistance - 1 x daily - 7 x weekly - 3 sets - 10 reps   Hooklying Isometric Clamshell - 1 x daily - 7 x weekly - 2 sets - 10 reps   Supine Bridge with Resistance Band - 1 x daily - 7 x weekly - 2 sets - 10 reps   Prone Hip Extension on Table - 1 x daily - 7 x weekly - 2-3 sets - 10 reps   Supine Alternating Shoulder Flexion - 1 x daily - 7 x weekly - 3 sets - 10 reps      Therapeutic Exercise and NMR EXR  [x] (08561) Provided verbal/tactile cueing for activities related to strengthening, flexibility, endurance, ROM  for improvements in proximal hip and core control with self care, mobility, lifting and ambulation.  [] (43680) Provided verbal/tactile cueing for activities related to improving balance, coordination, kinesthetic sense, posture, motor skill, proprioception  to assist with core control in self care, mobility, lifting, and ambulation.   [] (77033) Therapist is in constant attendance of 2 or more patients providing skilled therapy interventions, but not providing any significant amount of measurable one-on-one time to either patient, for improvements in LE, proximal hip, and core control in self care, mobility, lifting, ambulation and eccentric single leg control. Therapeutic Activities:    [x] (86030 or 49109) Provided verbal/tactile cueing for activities related to improving balance, coordination, kinesthetic sense, posture, motor skill, proprioception and motor activation to allow for proper function  with self care and ADLs  [] (09754) Provided training and instruction to the patient for proper core and proximal hip recruitment and positioning with ambulation re-education     Home Exercise Program:    [x] (35063) Reviewed/Progressed HEP activities related to strengthening, flexibility, endurance, ROM of core, proximal hip and LE for functional self-care, mobility, lifting and ambulation   [] (03498) Reviewed/Progressed HEP activities related to improving balance, coordination, kinesthetic sense, posture, motor skill, proprioception of core, proximal hip and LE for self care, mobility, lifting, and ambulation      Manual Treatments:  PROM / STM / Oscillations-Mobs:  G-I, II, III, IV (PA's, Inf., Post.)  [x] (81497) Provided manual therapy to mobilize proximal hip and LS spine soft tissue/joints for the purpose of modulating pain, promoting relaxation,  increasing ROM, reducing/eliminating soft tissue swelling/inflammation/restriction, improving soft tissue extensibility and allowing for proper ROM for normal function with self care, mobility, lifting and ambulation.        Charges:  Timed Code Treatment Minutes: 10   Total Treatment Minutes: 45       [] EVAL - LOW (60801)   [] EVAL - MOD (22118)  [] EVAL - HIGH (17908)  [] RE-EVAL (11343)  [] JT(29692) x  1   [] Ionto  [] NMR (33849) x 1     [] Vaso  [x] Manual (42048) x 1   [] Ultrasound  [] TA x 1       [] Mech Traction (98314)  [] Aquatic Therapy x     [] ES (un) (13756):   [] Home Management Training x  [] ES(attended) (00484)   [] Dry Needling 1-2 muscles (58060):  [x] Dry Needling 3+ muscles (623888  [] Group:      [] Other:       OALS:  Patient stated goal: Hip/leg strengthening  [] Progressing: [] Met: [] Not Met: [] Adjusted    Therapist goals for Patient:   Short Term Goals: To be achieved in: 2 weeks  1. Independent in HEP and progression per patient tolerance, in order to prevent re-injury. [] Progressing: [] Met: [] Not Met: [] Adjusted  2. Patient will have a decrease in pain to facilitate improvement in movement, function, and ADLs as indicated by Functional Deficits. [] Progressing: [] Met: [] Not Met: [] Adjusted    Long Term Goals: To be achieved in: 8-10 weeks  1. Disability index score of 20% or less for the SHRUTI to assist with reaching prior level of function. [] Progressing: [] Met: [] Not Met: [] Adjusted  2. Patient will demonstrate increased AROM to WNL, good LS mobility, good hip ROM to allow for proper joint functioning as indicated by patient's ability to perform squatting for IADLS with 97% decrease in pain,  [] Progressing: [] Met: [] Not Met: [] Adjusted  3. Patient will demonstrate an increase in Strength to good proximal hip and core activation to allow for proper functional mobility as indicated by patient' sability to walk on uneven ground/grass without LOB to play with grandkids  [] Progressing: [] Met: [] Not Met: [] Adjusted  4. Patient will return to functional activities including ascending/descending stairs reciprocally without rail X12  without increased symptoms or restriction. [] Progressing: [] Met: [] Not Met: [] Adjusted  5.  Patient to be able to  Participate in hiking on mild to moderate hills with  for short 20 minute hikes by discharge with minimal to no pain. [] Progressing: [] Met: [] Not Met: [] Adjusted   Overall Progression Towards Functional goals/ Treatment Progress Update:  [x] Patient is progressing as expected towards functional goals listed. [] Progression is slowed due to complexities/Impairments listed. [] Progression has been slowed due to co-morbidities. [] Plan just implemented, too soon to assess goals progression <30days   [] Goals require adjustment due to lack of progress  [] Patient is not progressing as expected and requires additional follow up with physician  [] Other    Persisting Functional Limitations/Impairments:  [x]Sitting [x]Standing   [x]Walking [x]Squatting/bending    [x]Stairs []ADL's    [x]Transfers [x]Reaching  []Housework []Job related tasks  []Driving []Sports/Recreation   []Sleeping []Other:    ASSESSMENT:  Patient tolerated DN session without increased complaints of pain. After DN and STM , decreased myofascial bands and TP noted. Reassess benefit of DN on next patient PT session. Slowly add in hip/core strength as mobility/flexibility improves. Treatment/Activity Tolerance:  [x] Patient able to complete tx  [] Patient limited by fatique  [] Patient limited by pain  [] Patient limited by other medical complications  [] Other:     Prognosis: [x] Good [] Fair  [] Poor    Patient Requires Follow-up: [x] Yes  [] No    PLAN: See eval. PT 2x / week for 8-10 weeks. [x] Continue per plan of care [] Alter current plan (see comments)  [] Plan of care initiated [] Hold pending MD visit [] Discharge    Electronically signed by: Maryann Estrella, PT PT, MSPT      Note: If patient does not return for scheduled/ recommended follow up visits, this note will serve as a discharge from care along with most recent update on progress.

## 2021-06-08 ENCOUNTER — HOSPITAL ENCOUNTER (OUTPATIENT)
Dept: PHYSICAL THERAPY | Age: 71
Setting detail: THERAPIES SERIES
Discharge: HOME | End: 2021-06-08
Payer: MEDICARE

## 2021-06-08 PROCEDURE — 20561 NDL INSJ W/O NJX 3+ MUSC: CPT

## 2021-06-08 PROCEDURE — 97110 THERAPEUTIC EXERCISES: CPT

## 2021-06-08 PROCEDURE — 97140 MANUAL THERAPY 1/> REGIONS: CPT

## 2021-06-08 NOTE — FLOWSHEET NOTE
Reggie Ulloa  Phone: [phone]   Fax: [phone]    Physical Therapy Treatment Note/ Progress Report:     Date:  2021    Patient Name:  Kaitlin Leslie    :  1950  MRN: [id number]  Restrictions/Precautions:    Medical/Treatment Diagnosis Information:  · Diagnosis: L Hip Intertrochanteric Bursistis M70.62; M51.36 Lumbar DDD, Lumbar Foraminal Stenosis M48.061  · Treatment Diagnosis: Lumbosacral POstural Malalignments with decreased trunk/hip motion with Limited positions tolerance and pain with transitional movements especially with low levels  Insurance/Certification information:  PT Insurance Information: Medicare; [facility]  Physician Information:  Referring Practitioner: Dr. Nanette Medina of care signed (Y/N): [x]  Yes []  No    Date of Patient follow up with Physician:      Progress Report: []  Yes  [x]  No     Date Range for reporting period:  Beginnin2021  Ending:     Progress report due (10 Rx/or 30 days whichever is less): visit #10 or  (date)     Recertification due (POC duration/ or 90 days whichever is less):  2021    Visit # Insurance Allowable Auth required? Date Range   8/15- Medicare [address]  []  Yes  [x]  No      Latex Allergy:  [x]NO      []YES  Preferred Language for Healthcare:   [x]English       []other:    Functional Scale:       Date assessed:    Oswestry: raw score = 59; dysfunction = 26.25%  2021     Pain level:3-4/10 Outer L thigh    Lumbar MRI results:   CONCLUSION:   1. At L3-4, minimal, grade 1 retrolisthesis. Left foraminal protrusion with thin annular    fissure and low-grade left foraminal stenosis.  Mild to moderate right and mild left facet    hypertrophy with ligamentum flavum thickening. 2. At L4-5, disc space narrowing. Left foraminal protrusion with low-grade left foraminal    stenosis.  Right parasagittal to extraforaminal mixed spondylotic protrusion, with adjacent rotated, and coned to produce intramuscular mobilization as well as piriformis, gluteus minimus with a 75 mm needle. .  These techniques were used to restore functional range of motion, reduce muscle spasm and induce healing in the corresponding musculature. (30712)  Clean Technique was utilized today while applying Dry needling treatment. The treatment sites where cleaned with 70% solution of  isopropyl alcohol . The PT washed their hands and utilized treatment gloves along with hand  prior to inserting the needles. All needles where removed and discarded in the appropriate sharps container. 5/27 consisted on the placement of 5 needles in the following muscles:  gluteus chela  gluteus medius, TFL. A 50-60 mm needle was inserted, piston, rotated, and coned to produce intramuscular mobilization as well as piriformis, gluteus minimus with a 75 mm needle. .  These techniques were used to restore functional range of motion, reduce muscle spasm and induce healing in the corresponding musculature. (81639)  Clean Technique was utilized today while applying Dry needling treatment. The treatment sites where cleaned with 70% solution of  isopropyl alcohol . The PT washed their hands and utilized treatment gloves along with hand  prior to inserting the needles. All needles where removed and discarded in the appropriate sharps container. Modalities:   Manual:   5/20: After R Leg pull to correct R innominate upslip, MET to correct L5FRSR, LOL sacral torsionR SI posterior innominate SI, L SI anterior innominate SI  5/18: MET to correct L5 FRSR, ROR sacral torsion, R SI posterior innominate SI, L SI anterior innominate SI  5/13: MET to correct L4 FRSR, L5 FRSL, LEAH sacral torsion  Pt. Education:  -pt educated on diagnosis, prognosis and expectations for rehab  -all pt questions were answered    Home Exercise Prorgam:  Access Code: [email or web address]  URL: [email or web address]  Date: function, and ADLs as indicated by Functional Deficits. [] Progressing: [] Met: [] Not Met: [] Adjusted    Long Term Goals: To be achieved in: 8-10 weeks  1. Disability index score of 20% or less for the SHRUTI to assist with reaching prior level of function. [] Progressing: [] Met: [] Not Met: [] Adjusted  2. Patient will demonstrate increased AROM to WNL, good LS mobility, good hip ROM to allow for proper joint functioning as indicated by patient's ability to perform squatting for IADLS with 04% decrease in pain,  [] Progressing: [] Met: [] Not Met: [] Adjusted  3. Patient will demonstrate an increase in Strength to good proximal hip and core activation to allow for proper functional mobility as indicated by patient' sability to walk on uneven ground/grass without LOB to play with grandkids  [] Progressing: [] Met: [] Not Met: [] Adjusted  4. Patient will return to functional activities including ascending/descending stairs reciprocally without rail X12  without increased symptoms or restriction. [] Progressing: [] Met: [] Not Met: [] Adjusted  5. Patient to be able to  Participate in hiking on mild to moderate hills with  for short 20 minute hikes by discharge with minimal to no pain. [] Progressing: [] Met: [] Not Met: [] Adjusted   Overall Progression Towards Functional goals/ Treatment Progress Update:  [x] Patient is progressing as expected towards functional goals listed. [] Progression is slowed due to complexities/Impairments listed. [] Progression has been slowed due to co-morbidities.   [] Plan just implemented, too soon to assess goals progression <30days   [] Goals require adjustment due to lack of progress  [] Patient is not progressing as expected and requires additional follow up with physician  [] Other    Persisting Functional Limitations/Impairments:  [x]Sitting [x]Standing   [x]Walking [x]Squatting/bending    [x]Stairs []ADL's    [x]Transfers [x]Reaching  []Housework []Job related tasks  []Driving []Sports/Recreation   []Sleeping []Other:    ASSESSMENT:  6/8 benefits from DN  5/27 Patient tolerated DN session without increased complaints of pain. After DN and STM , decreased myofascial bands and TP noted. Reassess benefit of DN on next patient PT session. Slowly add in hip/core strength as mobility/flexibility improves. Treatment/Activity Tolerance:  [x] Patient able to complete tx  [] Patient limited by fatique  [] Patient limited by pain  [] Patient limited by other medical complications  [] Other:     Prognosis: [x] Good [] Fair  [] Poor    Patient Requires Follow-up: [x] Yes  [] No    PLAN: See eval. PT 2x / week for 8-10 weeks. [x] Continue per plan of care [] Alter current plan (see comments)  [] Plan of care initiated [] Hold pending MD visit [] Discharge    Electronically signed by: Caryn Handy, PT DPT      Note: If patient does not return for scheduled/ recommended follow up visits, this note will serve as a discharge from care along with most recent update on progress.

## 2021-06-15 ENCOUNTER — HOSPITAL ENCOUNTER (OUTPATIENT)
Dept: PHYSICAL THERAPY | Age: 71
Setting detail: THERAPIES SERIES
Discharge: HOME | End: 2021-06-15
Payer: MEDICARE

## 2021-06-15 PROCEDURE — 97112 NEUROMUSCULAR REEDUCATION: CPT

## 2021-06-15 PROCEDURE — 97110 THERAPEUTIC EXERCISES: CPT

## 2021-06-15 NOTE — FLOWSHEET NOTE
Reggie Ulloa  Phone: [phone]   Fax: [phone]    Physical Therapy Treatment Note/ Progress Report:     Date:  6/15/2021    Patient Name:  Micheal Garnett    :  1950  MRN: [id number]  Restrictions/Precautions:    Medical/Treatment Diagnosis Information:  · Diagnosis: L Hip Intertrochanteric Bursistis M70.62; M51.36 Lumbar DDD, Lumbar Foraminal Stenosis M48.061  · Treatment Diagnosis: Lumbosacral POstural Malalignments with decreased trunk/hip motion with Limited positions tolerance and pain with transitional movements especially with low levels  Insurance/Certification information:  PT Insurance Information: Medicare; [facility]  Physician Information:  Referring Practitioner: Dr. Kerwin Coburn of care signed (Y/N): [x]  Yes []  No    Date of Patient follow up with Physician:      Progress Report: []  Yes  [x]  No     Date Range for reporting period:  Beginnin2021  Ending:     Progress report due (10 Rx/or 30 days whichever is less): Next Visit    Recertification due (POC duration/ or 90 days whichever is less):  2021    Visit # Insurance Allowable Auth required? Date Range   8/15- Medicare [address] [place] [address]  []  Yes  [x]  No      Latex Allergy:  [x]NO      []YES  Preferred Language for Healthcare:   [x]English       []other:    Functional Scale:       Date assessed:    Oswestry: raw score = 59; dysfunction = 26.25%  2021     Pain level:3-4/10 Outer L thigh    Lumbar MRI results:   CONCLUSION:   1. At L3-4, minimal, grade 1 retrolisthesis. Left foraminal protrusion with thin annular    fissure and low-grade left foraminal stenosis.  Mild to moderate right and mild left facet    hypertrophy with ligamentum flavum thickening. 2. At L4-5, disc space narrowing. Left foraminal protrusion with low-grade left foraminal    stenosis.  Right parasagittal to extraforaminal mixed spondylotic protrusion, with adjacent    endplate hypertrophy.  Mild to moderate right foraminal stenosis with mild abutment of exiting    right L4 nerve root. Right lateral recess stenosis and mild abutment of transiting right L5    nerve root. Mild bilateral facet hypertrophy and ligamentum flavum thickening. Low-grade spinal    stenosis. 3. At L5-S1, left foraminal mixed spondylotic protrusion with adjacent endplate hypertrophy.     Low-grade left foraminal stenosis. Right foraminal mixed spondylotic displacement with adjacent    endplate hypertrophy.  Mild to moderate right foraminal stenosis with abutment of exiting    right L5 nerve root. LE MRI 4/14:  CONCLUSION:   1. Low-grade left hip chondromalacia. 2. Short, thin anterolateral left acetabular labral tear.  Separate posterior labral tear with    a large 5.5 x 1.3 x 1.4 cm paralabral cyst extending posterior to the left acetabulum and deep    to the left gluteus medius muscle. 3. Mild to moderate distal left gluteus minimus tendinosis with few small, thin/low-grade    partial-thickness and interstitial tears.  Low-grade distal left gluteus medius tendon strain,    with an associated 1.5 x 0.4 x 1 cm chronic avulsion fracture fragment near the left greater    trochanter. 4.  Low-grade left hamstring origin tendinosis with mild left ischiofemoral impingement       SUBJECTIVE  6/15: pt reported DN is helping, no pain/soreness from last treatment session/  OBJECTIVE:   Test measurements: 5/25:L5 FRSL, LOL sacral torsion, R innominate Upslip    5/18: L5 FRSR, ROR sacral torsion    RESTRICTIONS/PRECAUTIONS: dizziness, cervical DDD, L hip labral tears, see mRIs above    :     Exercises/Interventions:     Therapeutic Exercise (24950) Resistance / level Sets / Seconds Reps Notes / Cues   lime 2 8 HEP   lime 1 10 HEP    lime 1 12 HEP    1 12 HEP    1 10 L/R HEP   Clam shell 2 10 R    L B squats  70#    R Single leg   L Single Leg  3      1  1 10      10  10 5/13   Standing HS and hip flex  stretch 30\" 3 L/R     Nustep Level 1 5.5'  S=10, arms = 10, L4   Mat ex  Fig 4 piriformis and ITB stretches    3   30\"R/L           L       SL bridges  1 5 6/15 added   Sustained SL with Contralateral LAQ  2 10 6/15 added   \"Butt Burners\"  Sidelying hip ABD  Sidelying sustain hip ABD w/            knee flexion    1  1   10  10 6/15 added                        Therapeutic Activities (68913)       4/28/21 Pt was educated on PT POC, Diagnosis, Prognosis, pathomechanics as well as frequency and duration of scheduling future physical therapy appointments. Time was also taken on this day to answer all patient questions and participation in PT. Neuromuscular Re-ed (14318)               2 10 L 5/11   2 10  5/13 5/13   2    1 10    10 L/R 5/18   Blue2 10 5/18   Blue2 10 5/18   1 10 5/18    1 10 5/20   Lateral step-ups 4\" 2 10 5/25   Hip Hikes4\" 2 10 5/25   Lumbar Stabs/TrA Activation hookline: Neutral pelvic tilt    Single marches /alt    Opp Shellia Mcdaniels marches /Level 1 A       1        1       10 R/L        10 R/L 6/15   Hooklying Lower abdominal progression with foam unilateral bent leg lift    (hold foam between elbow and knee) black foam roll 11.25\" long 5.25\" diameter        Contralateral arm/leg with foam    Ipsilateral arm/leg with foam   1        1   10 R/L        10 R/L Added 6/15                     Airex:   Stagger stance L/R    Head turns with stagger stance 30\"          1 3 L/R          30 L/R 5/25   Manual Intervention (15652)       Prone PA       GISTM/STM  h  as well as STM X10' R sidelying    Lumbar Manip       SI Manip       Hip belt mobs       Hip LA distraction R LE     MET to correct     See manual section     6/8                   Dry needling manual therapy:   5/3 received permission to dry needle pt at gluteals and /or multifidi  from Dr. Milton Arredondo as long as pt tolerates.      6/8 DRY NEEDLING:  consisted on the placement of 5 needles in the following muscles:  1x50mm needle to L gluteus chela, 2x50 mm needles L  gluteus medius, 1x50mm needle L piriformis. Needles were inserted, piston, rotated, and coned to produce intramuscular mobilization as well as piriformis, gluteus minimus with a 75 mm needle. .  These techniques were used to restore functional range of motion, reduce muscle spasm and induce healing in the corresponding musculature. (68252)  Clean Technique was utilized today while applying Dry needling treatment. The treatment sites where cleaned with 70% solution of  isopropyl alcohol . The PT washed their hands and utilized treatment gloves along with hand  prior to inserting the needles. All needles where removed and discarded in the appropriate sharps container. 5/27 consisted on the placement of 5 needles in the following muscles:  gluteus chela  gluteus medius, TFL. A 50-60 mm needle was inserted, piston, rotated, and coned to produce intramuscular mobilization as well as piriformis, gluteus minimus with a 75 mm needle. .  These techniques were used to restore functional range of motion, reduce muscle spasm and induce healing in the corresponding musculature. (98092)  Clean Technique was utilized today while applying Dry needling treatment. The treatment sites where cleaned with 70% solution of  isopropyl alcohol . The PT washed their hands and utilized treatment gloves along with hand  prior to inserting the needles. All needles where removed and discarded in the appropriate sharps container. Modalities:   Manual:   6/15: MET to correct L5 ERSL  6/14: Left Sidelying gapping technique to ERSL at L5,  5/20: After R Leg pull to correct R innominate upslip, MET to correct L5FRSR, LOL sacral torsionR SI posterior innominate SI, L SI anterior innominate SI  5/18: MET to correct L5 FRSR, ROR sacral torsion, R SI posterior innominate SI, L SI anterior innominate SI  5/13: MET to correct L4 FRSR, L5 FRSL, LEAH sacral torsion  Pt. Education:  -pt educated on diagnosis, prognosis and expectations for rehab  -all pt questions were answered    Home Exercise Prorgam:  Access Code: [email or web address]  URL: [email or web address]  Date: 06/15/2021  Prep[facility] by: [facility]    Exercises  Sidelying Hip Abduction - 1 x daily - 7 x weekly - 10 reps - 3 sets  Access Code: [email or web address]  URL: [email or web address]  Date: 05/18/2021  Prep[facility] by: [facility]    Exercises  Anti-Rotation Sidestepping with Resistance - 1 x daily - 7 x weekly - 10 reps - 3 sets  Standing Row with Resistance - 1 x daily - 7 x weekly - 10 reps - 3 sets  Shoulder Extension with Resistance - 1 x daily - 7 x weekly - 10 reps - 3 sets  Access Code: [place]: "[employer]/Date: 04/28/2021Prepared by:  Zachary David   Hooklying Clamshell with Resistance - 1 x daily - 7 x weekly - 3 sets - 10 reps   Hooklying Isometric Clamshell - 1 x daily - 7 x weekly - 2 sets - 10 reps   Supine Bridge with Resistance Band - 1 x daily - 7 x weekly - 2 sets - 10 reps   Prone Hip Extension on Table - 1 x daily - 7 x weekly - 2-3 sets - 10 reps   Supine Alternating Shoulder Flexion - 1 x daily - 7 x weekly - 3 sets - 10 reps      Therapeutic Exercise and NMR EXR  [x] (21398) Provided verbal/tactile cueing for activities related to strengthening, flexibility, endurance, ROM  for improvements in proximal hip and core control with self care, mobility, lifting and ambulation.  [] (74951) Provided verbal/tactile cueing for activities related to improving balance, coordination, kinesthetic sense, posture, motor skill, proprioception  to assist with core control in self care, mobility, lifting, and ambulation.   [] (22133) Therapist is in constant attendance of 2 or more patients providing skilled therapy interventions, but not providing any significant amount of measurable one-on-one time to either patient, for improvements in LE, proximal hip, and core control in self care, mobility, lifting, ambulation and eccentric single leg control. Therapeutic Activities:    [x] (67832 or 16512) Provided verbal/tactile cueing for activities related to improving balance, coordination, kinesthetic sense, posture, motor skill, proprioception and motor activation to allow for proper function  with self care and ADLs  [] (58084) Provided training and instruction to the patient for proper core and proximal hip recruitment and positioning with ambulation re-education     Home Exercise Program:    [x] (34723) Reviewed/Progressed HEP activities related to strengthening, flexibility, endurance, ROM of core, proximal hip and LE for functional self-care, mobility, lifting and ambulation   [] (60566) Reviewed/Progressed HEP activities related to improving balance, coordination, kinesthetic sense, posture, motor skill, proprioception of core, proximal hip and LE for self care, mobility, lifting, and ambulation      Manual Treatments:  PROM / STM / Oscillations-Mobs:  G-I, II, III, IV (PA's, Inf., Post.)  [x] (73796) Provided manual therapy to mobilize proximal hip and LS spine soft tissue/joints for the purpose of modulating pain, promoting relaxation,  increasing ROM, reducing/eliminating soft tissue swelling/inflammation/restriction, improving soft tissue extensibility and allowing for proper ROM for normal function with self care, mobility, lifting and ambulation.        Charges:  Timed Code Treatment Minutes: 45   Total Treatment Minutes: 45       [] EVAL - LOW (05789)   [] EVAL - MOD (73945)  [] EVAL - HIGH (19771)  [] RE-EVAL (64790)  [x] SL(53726) x  2   [] Ionto  [x] NMR (08002) x 1     [] Vaso  [] Manual (02205) x 1   [] Ultrasound  [] TA x 1       [] Mech Traction (83609)  [] Aquatic Therapy x     [] ES (un) (07541):   [] Home Management Training x  [] ES(attended) (43665)   [] Dry Needling 1-2 muscles (53390):  [x] Dry Needling 3+ muscles (525415  [] Group:      [] Other:       GOALS:  Patient stated goal: Hip/leg strengthening  [x] Progressing: [] Met: [] Not Met: [] Adjusted    Therapist goals for Patient:   Short Term Goals: To be achieved in: 2 weeks  1. Independent in HEP and progression per patient tolerance, in order to prevent re-injury. [] Progressing: [x] Met: [] Not Met: [] Adjusted  2. Patient will have a decrease in pain to facilitate improvement in movement, function, and ADLs as indicated by Functional Deficits. [] Progressing: [x] Met: [] Not Met: [] Adjusted    Long Term Goals: To be achieved in: 8-10 weeks  1. Disability index score of 20% or less for the SHRUTI to assist with reaching prior level of function. [x] Progressing: [] Met: [] Not Met: [] Adjusted  2. Patient will demonstrate increased AROM to WNL, good LS mobility, good hip ROM to allow for proper joint functioning as indicated by patient's ability to perform squatting for IADLS with 58% decrease in pain,  [x] Progressing: [] Met: [] Not Met: [] Adjusted  3. Patient will demonstrate an increase in Strength to good proximal hip and core activation to allow for proper functional mobility as indicated by patient' sability to walk on uneven ground/grass without LOB to play with grandkids  [x] Progressing: [] Met: [] Not Met: [] Adjusted  4. Patient will return to functional activities including ascending/descending stairs reciprocally without rail X12  without increased symptoms or restriction. [x] Progressing: [] Met: [] Not Met: [] Adjusted  5. Patient to be able to  Participate in hiking on mild to moderate hills with  for short 20 minute hikes by discharge with minimal to no pain. [x] Progressing: [] Met: [] Not Met: [] Adjusted     Overall Progression Towards Functional goals/ Treatment Progress Update:  [x] Patient is progressing as expected towards functional goals listed. [] Progression is slowed due to complexities/Impairments listed. [] Progression has been slowed due to co-morbidities.   [] Plan just implemented, too soon to assess goals progression <30days   [] Goals require adjustment due to lack of progress  [] Patient is not progressing as expected and requires additional follow up with physician  [] Other    Persisting Functional Limitations/Impairments:  [x]Sitting [x]Standing   [x]Walking [x]Squatting/bending    [x]Stairs []ADL's    [x]Transfers [x]Reaching  []Housework []Job related tasks  []Driving []Sports/Recreation   []Sleeping []Other:    ASSESSMENT:  6/15: Pt tolerated treatment well. Upon palpation of L piriformis and quadratus femoris revealed tightness and trigger points, but is reduced in number and intensity following DN lest session. Pt tolerated NMR core/hip exercises with increased difficulty today, recommend continuing new exercises next visit, specifically, Hip/core strengthening. Treatment/Activity Tolerance:  [x] Patient able to complete tx  [] Patient limited by fatique  [] Patient limited by pain  [] Patient limited by other medical complications  [] Other:     Prognosis: [x] Good [] Fair  [] Poor    Patient Requires Follow-up: [x] Yes  [] No    PLAN: See eval. PT 2x / week for 8-10 weeks. [x] Continue per plan of care [] Alter current plan (see comments)  [] Plan of care initiated [] Hold pending MD visit [] Discharge    Electronically signed by: Alissa Quinn PT BOOGIE [facility]  Therapist was present, directed the patient's care, made skilled judgement, and was responsible for assessment and treatment of the patient. Note: If patient does not return for scheduled/ recommended follow up visits, this note will serve as a discharge from care along with most recent update on progress.

## 2021-06-17 ENCOUNTER — APPOINTMENT (OUTPATIENT)
Dept: PHYSICAL THERAPY | Age: 71
End: 2021-06-17
Payer: MEDICARE

## 2021-06-22 ENCOUNTER — HOSPITAL ENCOUNTER (OUTPATIENT)
Dept: PHYSICAL THERAPY | Age: 71
Setting detail: THERAPIES SERIES
Discharge: HOME | End: 2021-06-22
Payer: MEDICARE

## 2021-06-22 PROCEDURE — 97110 THERAPEUTIC EXERCISES: CPT

## 2021-06-22 PROCEDURE — 97140 MANUAL THERAPY 1/> REGIONS: CPT

## 2021-06-22 PROCEDURE — 20561 NDL INSJ W/O NJX 3+ MUSC: CPT

## 2021-06-22 NOTE — PROGRESS NOTES
Reggie Ulloa  Phone: [phone]   Fax: [phone]    Physical Therapy Treatment Note/ Progress Report:   Physical Therapy Re-Certification Plan of Care    Dear Dr. José Miguel Saldana,   We had the pleasure of treating the following patient for physical therapy services at [facility]. A summary of our findings can be found in the updated assessment below. This includes our plan of care. If you have any questions or concerns regarding these findings, please do not hesitate to contact me at the office phone number checked above. Thank you for the referral.     Physician Signature:________________________________Date:__________________  By signing above (or electronic signature), therapist's plan is approved by physician      Functional Outcome: SHRUTI: 8, 16%                                                    Overall Response to Treatment:   [x]Patient is responding well to treatment and improvement is noted with regards  to goals   []Patient should continue to improve in reasonable time if they continue HEP   []Patient has plateaued and is no longer responding to skilled PT intervention    []Patient is getting worse and would benefit from return to referring MD   []Patient unable to adhere to initial POC   []Other:   Date range of Visits: Eval-2021  Total Visits: 9    Recommendation:    [x]Continue PT 2x / wk for 4-6 weeks.                []Hold PT, pending MD visit          Date:  2021    Patient Name:  Trisha Genao    :  1950  MRN: [id number]  Restrictions/Precautions:    Medical/Treatment Diagnosis Information:  · Diagnosis: L Hip Intertrochanteric Bursistis M70.62; M51.36 Lumbar DDD, Lumbar Foraminal Stenosis M48.061  · Treatment Diagnosis: Lumbosacral POstural Malalignments with decreased trunk/hip motion with Limited positions tolerance and pain with transitional movements especially with low levels  Insurance/Certification information:  PT Insurance Information: Medicare; [facility]  Physician Information:  Referring Practitioner: Dr. Weldon Apley of care signed (Y/N): [x]  Yes []  No    Date of Patient follow up with Physician:      Progress Report: [x]  Yes  []  No     Date Range for reporting period:  Beginnin2021  PN: 2021  Ending:     Progress report due (10 Rx/or 30 days whichever is less): 5703  /Recertification due (POC duration/ or 90 days whichever is less):  2021    /Visit # Insurance Allowable Auth required? Date Range   9/15- Medicare [address] [place] [address]  []  Yes  [x]  No      Latex Allergy:  [x]NO      []YES  Preferred Language for Healthcare:   [x]English       []other:    Functional Scale:       Date assessed:    Oswestry: raw score = 59; dysfunction = 26.25%  2021   Oswestry: raw score = 8; dysfunction = 16.00%  2021   Pain level2-3/10 Outer L thigh    Lumbar MRI results:   CONCLUSION:   1. At L3-4, minimal, grade 1 retrolisthesis. Left foraminal protrusion with thin annular    fissure and low-grade left foraminal stenosis.  Mild to moderate right and mild left facet    hypertrophy with ligamentum flavum thickening. 2. At L4-5, disc space narrowing. Left foraminal protrusion with low-grade left foraminal    stenosis. Right parasagittal to extraforaminal mixed spondylotic protrusion, with adjacent    endplate hypertrophy.  Mild to moderate right foraminal stenosis with mild abutment of exiting    right L4 nerve root. Right lateral recess stenosis and mild abutment of transiting right L5    nerve root. Mild bilateral facet hypertrophy and ligamentum flavum thickening. Low-grade spinal    stenosis. 3. At L5-S1, left foraminal mixed spondylotic protrusion with adjacent endplate hypertrophy.     Low-grade left foraminal stenosis.  Right foraminal mixed spondylotic displacement with adjacent    endplate hypertrophy.  Mild to moderate right foraminal stenosis with abutment of exiting    right L5 nerve root. LE MRI 4/14:  CONCLUSION:   1. Low-grade left hip chondromalacia. 2. Short, thin anterolateral left acetabular labral tear.  Separate posterior labral tear with    a large 5.5 x 1.3 x 1.4 cm paralabral cyst extending posterior to the left acetabulum and deep    to the left gluteus medius muscle. 3. Mild to moderate distal left gluteus minimus tendinosis with few small, thin/low-grade    partial-thickness and interstitial tears.  Low-grade distal left gluteus medius tendon strain,    with an associated 1.5 x 0.4 x 1 cm chronic avulsion fracture fragment near the left greater    trochanter. 4. Low-grade left hamstring origin tendinosis with mild left ischiofemoral impingement       SUBJECTIVE  6/22:  Minimal c/o pain however L hip weakness with stairs, playing in yard with grandkids  OBJECTIVE:   Test measurements:   6/22: PN: Postural alignment:  R innominate SI, R4UBEWH/FRSL, L4ERSL, LOL SACRAL TORSION, L SI POSTERIOR INNOMINATE SI, R SI ANTERIOR INNOMINATE SI  LE STrength: L hip IR 4+/5, ER 4/5, Gluteus medius 4+/5; PIVM on L5 significantly limited    RESTRICTIONS/PRECAUTIONS: dizziness, cervical DDD, L hip labral tears, see mRIs above    :     Exercises/Interventions:     Therapeutic Exercise (99351) Resistance / level Sets / Seconds Reps Notes / Cues   lime 2 8 HEP   lime 1 10 HEP   Hookline Bridges   2 12 HEP    1 12 HEP    1 10 L/R HEP   Clam shell 2 10 R    L B squats  70#    R Single leg   L Single Leg  3      1  1 10      10  10 5/13   Standing HS and hip flex  stretch  30\" 3 L/R     Nustep Level 1 5.5'  S=10, arms = 10, L4   Mat ex  Fig 4 piriformis and ITB stretches    3   30\"R/L           L       SL bridges  2 5 6/15 added     2 10 6/15 added   \"    1  1   10  10 6/15 added   SLR  Abduction  extension    1  1   10 R/L  10 R/L    TRX Squat with chair tap  2 10    Prone press ups lumbar etxtension  1/10: 10 6/22HEP   Therapeutic ERSL  6/14: Left Sidelying gapping technique to ERSL at L5,  5/20: After R Leg pull to correct R innominate upslip, MET to correct L5FRSR, LOL sacral torsionR SI posterior innominate SI, L SI anterior innominate SI  5/18: MET to correct L5 FRSR, ROR sacral torsion, R SI posterior innominate SI, L SI anterior innominate SI  5/13: MET to correct L4 FRSR, L5 FRSL, LEAH sacral torsion  Pt. Education:  -pt educated on diagnosis, prognosis and expectations for rehab  -all pt questions were answered    Home Exercise Prorgam:  Access Code: [email or web address]  URL: [email or web address]  Date: 06/15/2021  [facility]    Exercises  Sidelying Hip Abduction - 1 x daily - 7 x weekly - 10 reps - 3 sets  Access Code: [email or web address]  URL: [email or web address]  Date: 05/18/2021  Prep[facility] by: [facility]    Exercises  Anti-Rotation Sidestepping with Resistance - 1 x daily - 7 x weekly - 10 reps - 3 sets  Standing Row with Resistance - 1 x daily - 7 x weekly - 10 reps - 3 sets  Shoulder Extension with Resistance - 1 x daily - 7 x weekly - 10 reps - 3 sets  Access Code: Talia Rey: MyRefers/Date: 04/28/2021Prepared by:  Pascale Douse   Hooklying Clamshell with Resistance - 1 x daily - 7 x weekly - 3 sets - 10 reps   Hooklying Isometric Clamshell - 1 x daily - 7 x weekly - 2 sets - 10 reps   Supine Bridge with Resistance Band - 1 x daily - 7 x weekly - 2 sets - 10 reps   Prone Hip Extension on Table - 1 x daily - 7 x weekly - 2-3 sets - 10 reps   Supine Alternating Shoulder Flexion - 1 x daily - 7 x weekly - 3 sets - 10 reps      Therapeutic Exercise and NMR EXR  [x] (29390) Provided verbal/tactile cueing for activities related to strengthening, flexibility, endurance, ROM  for improvements in proximal hip and core control with self care, mobility, lifting and ambulation.  [] (95364) Provided verbal/tactile cueing for activities related to improving balance, coordination, kinesthetic sense, posture, motor skill, proprioception  to assist with core control in self care, mobility, lifting, and ambulation.   [] (38190) Therapist is in constant attendance of 2 or more patients providing skilled therapy interventions, but not providing any significant amount of measurable one-on-one time to either patient, for improvements in LE, proximal hip, and core control in self care, mobility, lifting, ambulation and eccentric single leg control. Therapeutic Activities:    [x] (10222 or 71467) Provided verbal/tactile cueing for activities related to improving balance, coordination, kinesthetic sense, posture, motor skill, proprioception and motor activation to allow for proper function  with self care and ADLs  [] (19795) Provided training and instruction to the patient for proper core and proximal hip recruitment and positioning with ambulation re-education     Home Exercise Program:    [x] (65779) Reviewed/Progressed HEP activities related to strengthening, flexibility, endurance, ROM of core, proximal hip and LE for functional self-care, mobility, lifting and ambulation   [] (43787) Reviewed/Progressed HEP activities related to improving balance, coordination, kinesthetic sense, posture, motor skill, proprioception of core, proximal hip and LE for self care, mobility, lifting, and ambulation      Manual Treatments:  PROM / STM / Oscillations-Mobs:  G-I, II, III, IV (PA's, Inf., Post.)  [x] (20481) Provided manual therapy to mobilize proximal hip and LS spine soft tissue/joints for the purpose of modulating pain, promoting relaxation,  increasing ROM, reducing/eliminating soft tissue swelling/inflammation/restriction, improving soft tissue extensibility and allowing for proper ROM for normal function with self care, mobility, lifting and ambulation.        Charges:  Timed Code Treatment Minutes: 47   Total Treatment Minutes: 47       [] EVAL - LOW (42135)   [] EVAL - MOD (23572)  [] EVAL - HIGH (21732)  [] RE-EVAL (03203)  [x] ZJ(36638) x  1    [] Ionto  [] NMR (01396) x      [] Vaso  [x] Manual (08547) x 1    [] Ultrasound  [] TA x 1       [] Mech Traction (60924)  [] Aquatic Therapy x      [] ES (un) (55355):   [] Home Management Training x  [] ES(attended) (10439)   [] Dry Needling 1-2 muscles (54272):  [x] Dry Needling 3+ muscles (386608  [] Group:      [] Other:       GOALS:  Patient stated goal: Hip/leg strengthening  [x] Progressing: [] Met: [] Not Met: [] Adjusted    Therapist goals for Patient:   Short Term Goals: To be achieved in: 2 weeks  1. Independent in HEP and progression per patient tolerance, in order to prevent re-injury. [] Progressing: [x] Met: [] Not Met: [] Adjusted  2. Patient will have a decrease in pain to facilitate improvement in movement, function, and ADLs as indicated by Functional Deficits. [] Progressing: [x] Met: [] Not Met: [] Adjusted    Long Term Goals: To be achieved in: 8-10 weeks  1. Disability index score of 20% or less for the SHRUTI to assist with reaching prior level of function. [] Progressing: [x] Met: [] Not Met: [] Adjusted  2. Patient will demonstrate increased AROM to WNL, good LS mobility, good hip ROM to allow for proper joint functioning as indicated by patient's ability to perform squatting for IADLS with 27% decrease in pain,  [x] Progressing: [] Met: [] Not Met: [] Adjusted  3. Patient will demonstrate an increase in Strength to good proximal hip and core activation to allow for proper functional mobility as indicated by patient' sability to walk on uneven ground/grass without LOB to play with grandkids  [x] Progressing: [] Met: [] Not Met: [] Adjusted  4. Patient will return to functional activities including ascending/descending stairs reciprocally without rail X12  without increased symptoms or restriction. [x] Progressing: [] Met: [] Not Met: [] Adjusted  5.  Patient to be able to  Participate in hiking on mild to moderate hills with  for short 20 minute hikes by discharge with minimal to no pain. [x] Progressing: [] Met: [] Not Met: [] Adjusted     Overall Progression Towards Functional goals/ Treatment Progress Update:  [x] Patient is progressing as expected towards functional goals listed. [] Progression is slowed due to complexities/Impairments listed. [] Progression has been slowed due to co-morbidities. [] Plan just implemented, too soon to assess goals progression <30days   [] Goals require adjustment due to lack of progress  [] Patient is not progressing as expected and requires additional follow up with physician  [] Other    Persisting Functional Limitations/Impairments:  [x]Sitting [x]Standing   [x]Walking [x]Squatting/bending    [x]Stairs []ADL's    [x]Transfers [x]Reaching  []Housework []Job related tasks  []Driving []Sports/Recreation   []Sleeping []Other:    ASSESSMENT:  6/22: Patient has made gains in her hip strength as well as improved functional scores on SHRUTI. Patient has decreased functional L LE strength still with weakness with prolonged walking /stairs/playing with grandchildren. She occasionally has numbness in her L posterior thigh which was addressed with dry needling into lower lumbar multifid this date as well as addressing Lumbar extension at lower lumbar region with mobilization,  Pt tolerated NMR core/hip exercises without difficulty. Patient may continue to benefit from skilled PT to address her impaiirments and functional limitations and restore her to her PLOF. Treatment/Activity Tolerance:  [x] Patient able to complete tx  [] Patient limited by fatique  [] Patient limited by pain  [] Patient limited by other medical complications  [] Other:     Prognosis: [x] Good [] Fair  [] Poor    Patient Requires Follow-up: [x] Yes  [] No    PLAN: See eval. PT 2x / week for 8-10 weeks.    [x] Continue per plan of care [] Alter current plan (see comments)  [] Plan of care initiated [] Hold pending MD visit [] Discharge    Electronically signed by: HAN Monique SPT  Therapist was present, directed the patient's care, made skilled judgement, and was responsible for assessment and treatment of the patient. Note: If patient does not return for scheduled/ recommended follow up visits, this note will serve as a discharge from care along with most recent update on progress.

## 2021-06-24 ENCOUNTER — HOSPITAL ENCOUNTER (OUTPATIENT)
Dept: PHYSICAL THERAPY | Age: 71
Setting detail: THERAPIES SERIES
Discharge: HOME | End: 2021-06-24
Payer: MEDICARE

## 2021-06-24 PROCEDURE — 20561 NDL INSJ W/O NJX 3+ MUSC: CPT

## 2021-06-24 PROCEDURE — 97032 APPL MODALITY 1+ESTIM EA 15: CPT

## 2021-06-24 PROCEDURE — 97140 MANUAL THERAPY 1/> REGIONS: CPT

## 2021-06-24 NOTE — FLOWSHEET NOTE
[address] [facility]  Phone: [phone]   Fax: [phone]    Physical Therapy Treatment Note:     Date:  2021    Patient Name:  Doris Avina    :  1950  MRN: [id number]  Restrictions/Precautions:    Medical/Treatment Diagnosis Information:  · Diagnosis: L Hip Intertrochanteric Bursistis M70.62; M51.36 Lumbar DDD, Lumbar Foraminal Stenosis M48.061  · Treatment Diagnosis: Lumbosacral POstural Malalignments with decreased trunk/hip motion with Limited positions tolerance and pain with transitional movements especially with low levels  Insurance/Certification information:  PT Insurance Information: Medicare; [facility]  Physician Information:  Referring Practitioner: Dr. Shelley [place] of care signed (Y/N): [x]  Yes []  No    Date of Patient follow up with Physician:      Progress Report: [x]  Yes  []  No     Date Range for reporting period:  Beginnin2021  PN: 2021  Ending:     Progress report due (10 Rx/or 30 days whichever is less):   /Recertification due (POC duration/ or 90 days whichever is less):  2021    /Visit # Insurance Allowable Auth required? Date Range   9/15- Medicare [address]  []  Yes  [x]  No      Latex Allergy:  [x]NO      []YES  Preferred Language for Healthcare:   [x]English       []other:    Functional Scale:       Date assessed:    Oswestry: raw score = 59; dysfunction = 26.25%  2021   Oswestry: raw score = 8; dysfunction = 16.00%  2021   Pain level; 2/10 Outer L thigh     Lumbar MRI results:   CONCLUSION:   1. At L3-4, minimal, grade 1 retrolisthesis. Left foraminal protrusion with thin annular    fissure and low-grade left foraminal stenosis.  Mild to moderate right and mild left facet    hypertrophy with ligamentum flavum thickening. 2. At L4-5, disc space narrowing. Left foraminal protrusion with low-grade left foraminal    stenosis.  Right parasagittal to extraforaminal mixed spondylotic protrusion, with adjacent    endplate hypertrophy.  Mild to moderate right foraminal stenosis with mild abutment of exiting    right L4 nerve root. Right lateral recess stenosis and mild abutment of transiting right L5    nerve root. Mild bilateral facet hypertrophy and ligamentum flavum thickening. Low-grade spinal    stenosis. 3. At L5-S1, left foraminal mixed spondylotic protrusion with adjacent endplate hypertrophy.     Low-grade left foraminal stenosis. Right foraminal mixed spondylotic displacement with adjacent    endplate hypertrophy.  Mild to moderate right foraminal stenosis with abutment of exiting    right L5 nerve root. LE MRI 4/14:  CONCLUSION:   1. Low-grade left hip chondromalacia. 2. Short, thin anterolateral left acetabular labral tear.  Separate posterior labral tear with    a large 5.5 x 1.3 x 1.4 cm paralabral cyst extending posterior to the left acetabulum and deep    to the left gluteus medius muscle. 3. Mild to moderate distal left gluteus minimus tendinosis with few small, thin/low-grade    partial-thickness and interstitial tears.  Low-grade distal left gluteus medius tendon strain,    with an associated 1.5 x 0.4 x 1 cm chronic avulsion fracture fragment near the left greater    trochanter. 4. Low-grade left hamstring origin tendinosis with mild left ischiofemoral impingement       SUBJECTIVE  6/23: reports tingling and weakness feeling is what is bothering her L lat hip down to just inf to L lat knee. [facility] [place]. Pt reports no negative side effects following DN at last session.    OBJECTIVE:   Test measurements:   6/24:   6/22: PN: Postural alignment:  R innominate SI, [id number], L4ERSL, LOL SACRAL TORSION, L SI POSTERIOR INNOMINATE SI, R SI ANTERIOR INNOMINATE SI  LE STrength: L hip IR 4+/5, ER 4/5, Gluteus medius 4+/5; PIVM on L5 significantly limited    RESTRICTIONS/PRECAUTIONS: dizziness, cervical DDD, L hip labral tears, see mRIs above    :     Exercises/Interventions:     Therapeutic Exercise (76197) Resistance / level Sets / Seconds Reps Notes / Cues   Hookline B Hip ER lime 2 8 HEP   Hookline R and R Hip ER only; L iso lime 1 10 HEP   Hookline Bridges   2 12 HEP    1 12 HEP    1 10 L/R HEP   Clam shell 2 10 R    L B squats  70#    R Single leg   L Single Leg  3      1  1 10      10  10 5/13   Standing HS and hip flex  stretch  30\" 3 L/R     Nustep Level 1 5.5'  S=10, arms = 10, L4   Mat ex  Fig 4 piriformis and ITB stretches    3   30\"R/L    Recumbent Bike  5'     L       SL bridges  2 5 6/15 added   Sustained SL with Contralateral LAQ  2 10 6/15 added   \"    1  1   10  10 6/15 added   SLR  Abduction  extension    1  1   10 R/L  10 R/L    TRX Squat with chair tap  2 10    Prone press ups lumbar etxtension  1/10: 10 6/22HEP   IB   Gastoc   Soleus     2  2   30\" R/L  30\" R/L 6/24                        Therapeutic Activities (73897)       4/28/21 Pt was educated on PT POC, Diagnosis, Prognosis, pathomechanics as well as frequency and duration of scheduling future physical therapy appointments. Time was also taken on this day to answer all patient questions and participation in PT.                                       Neuromuscular Re-ed (64183)               2 10 L 5/11   2 10  5/13   Hookline bridge with neutral pelvis 5/13   Total Gym2    1 10    10 L/R 5/18   Blue2 10 5/18   Blue2 10 5/18   1 10 5/18    1 10 5/20   4\" 2 10 5/25   4\" 2 10 5/25   Neutral pelvic tilt    Single marches /alt    Opp Le marches /Level 1 A       1        1       10 R/L        10 R/L 6/15   Contralateral arm/leg with foam    Ipsilateral arm/leg with foam   1        1   10 R/L        10 R/L Added 6/15                     Airex:   Stagger stance L/R    Head turns with stagger stance 30\"          1 3 L/R          30 L/R 5/25   Manual Intervention (44173)       Prone PA L1-L5 Grade 3    6/22   GISTM/STM  h  as well as STM X10' R sidelying    Lumbar Manip Gapping to R L4/5      SI Manip L unilateral sacral respiration mobs, L sacral Grade 4 mob, sacral nutation respiration mobs      Hip belt mobs       Hip LA distraction R LE     MET to correct     See manual section     6/8                   Dry needling manual therapy:   5/3 received permission to dry needle pt at gluteals and /or multifidi  from Dr. Sky Pacheco as long as pt tolerates. 6/24: Dry needling manual therapy: consisted on the placement of 9 needles in the following muscles: L quadratus lumborum, L/R lower lumbar mutlifidi, L/R Glut med, and L/R piriformis. 60 and 75 mm needles were inserted, piston, rotated, and coned to produce intramuscular mobilization. These techniques were used to restore functional range of motion, reduce muscle spasm and induce healing in the corresponding musculature. (62540)  Clean Technique was utilized today while applying Dry needling treatment. The treatment sites where cleaned with 70% solution of  isopropyl alcohol . The PT washed their hands and utilized treatment gloves along with hand  prior to inserting the needles. All needles where removed and discarded in the appropriate sharps container. .   Attended low frequency (1-20Hz) electrical stimulation was utilized in conjunction with Dry Needling:  the Estim was manipulated between all above needles for a period of 10 min. at  3-4 volts. The low frequency electrical stimulation was used to help reduce muscle spasm and help to interrupt /[place] the pain cycle. (41995)     6/22:Dry needling manual therapy: consisted on the placement of 4 needles in the following muscles:  L/R L4 and L5 region lumbar multifidi,   A 60 mm needle was inserted and coned to produce intramuscular mobilization. These techniques were used to restore functional range of motion, reduce muscle spasm and induce healing in the corresponding musculature.  (68530)  Clean Technique was utilized today while applying Dry needling treatment. The treatment sites where cleaned with 70% solution of  isopropyl alcohol . The PT washed their hands and utilized treatment gloves along with hand  prior to inserting the needles. All needles where removed and discarded in the appropriate sharps container. 6/8 DRY NEEDLING:  consisted on the placement of 5 needles in the following muscles:  1x50mm needle to L gluteus chela, 2x50 mm needles L  gluteus medius, 1x50mm needle L piriformis. Needles were inserted, piston, rotated, and coned to produce intramuscular mobilization as well as piriformis, gluteus minimus with a 75 mm needle. .  These techniques were used to restore functional range of motion, reduce muscle spasm and induce healing in the corresponding musculature. (02950)  Clean Technique was utilized today while applying Dry needling treatment. The treatment sites where cleaned with 70% solution of  isopropyl alcohol . The PT washed their hands and utilized treatment gloves along with hand  prior to inserting the needles. All needles where removed and discarded in the appropriate sharps container. 5/27 consisted on the placement of 5 needles in the following muscles:  gluteus chela  gluteus medius, TFL. A 50-60 mm needle was inserted, piston, rotated, and coned to produce intramuscular mobilization as well as piriformis, gluteus minimus with a 75 mm needle. .  These techniques were used to restore functional range of motion, reduce muscle spasm and induce healing in the corresponding musculature. (04080)  Clean Technique was utilized today while applying Dry needling treatment. The treatment sites where cleaned with 70% solution of  isopropyl alcohol . The PT washed their hands and utilized treatment gloves along with hand  prior to inserting the needles. All needles where removed and discarded in the appropriate sharps container.        Modalities:   Manual:   6/24: PAs to L TPs of L4/L5; L4/L5 L sidelying Manipulations, PAS L1-L5 GRade III  6/22: After R LA to correct R innominate upslip,  MET to correct D0NRNFU/FRSL, L4ERSL, LOL SACRAL TORSION, L SI POSTERIOR INNOMINATE SI, R SI ANTERIOR INNOMINATE SI  6/15: MET to correct L5 ERSL  6/14: Left Sidelying gapping technique to ERSL at L5,  5/20: After R Leg pull to correct R innominate upslip, MET to correct L5FRSR, LOL sacral torsionR SI posterior innominate SI, L SI anterior innominate SI  5/18: MET to correct L5 FRSR, ROR sacral torsion, R SI posterior innominate SI, L SI anterior innominate SI  5/13: MET to correct L4 FRSR, L5 FRSL, LEAH sacral torsion  Pt. Education:  -pt educated on diagnosis, prognosis and expectations for rehab  -all pt questions were answered    Home Exercise Prorgam:  Access Code: [email or web address]  URL: [email or web address]  Date: 06/15/2021  [facility]    Exercises  Sidelying Hip Abduction - 1 x daily - 7 x weekly - 10 reps - 3 sets  Access Code: [email or web address]  URL: [email or web address]  Date: 05/18/2021  [facility]: [facility]    Exercises  Anti-Rotation Sidestepping with Resistance - 1 x daily - 7 x weekly - 10 reps - 3 sets  Standing Row with Resistance - 1 x daily - 7 x weekly - 10 reps - 3 sets  Shoulder Extension with Resistance - 1 x daily - 7 x weekly - 10 reps - 3 sets  Access Code: Patience Button: PeeplePass/Date: 04/28/2021Prepared by:  Char Smith   Hooklying Clamshell with Resistance - 1 x daily - 7 x weekly - 3 sets - 10 reps   Hooklying Isometric Clamshell - 1 x daily - 7 x weekly - 2 sets - 10 reps   Supine Bridge with Resistance Band - 1 x daily - 7 x weekly - 2 sets - 10 reps   Prone Hip Extension on Table - 1 x daily - 7 x weekly - 2-3 sets - 10 reps   Supine Alternating Shoulder Flexion - 1 x daily - 7 x weekly - 3 sets - 10 reps      Therapeutic Exercise and NMR EXR  [x] (39261) Provided verbal/tactile cueing for activities related to strengthening, flexibility, endurance, ROM  for improvements in proximal hip and core control with self care, mobility, lifting and ambulation.  [] (30766) Provided verbal/tactile cueing for activities related to improving balance, coordination, kinesthetic sense, posture, motor skill, proprioception  to assist with core control in self care, mobility, lifting, and ambulation.   [] (90408) Therapist is in constant attendance of 2 or more patients providing skilled therapy interventions, but not providing any significant amount of measurable one-on-one time to either patient, for improvements in LE, proximal hip, and core control in self care, mobility, lifting, ambulation and eccentric single leg control.      Therapeutic Activities:    [x] (91990 or 79442) Provided verbal/tactile cueing for activities related to improving balance, coordination, kinesthetic sense, posture, motor skill, proprioception and motor activation to allow for proper function  with self care and ADLs  [] (92204) Provided training and instruction to the patient for proper core and proximal hip recruitment and positioning with ambulation re-education     Home Exercise Program:    [x] (38736) Reviewed/Progressed HEP activities related to strengthening, flexibility, endurance, ROM of core, proximal hip and LE for functional self-care, mobility, lifting and ambulation   [] (48880) Reviewed/Progressed HEP activities related to improving balance, coordination, kinesthetic sense, posture, motor skill, proprioception of core, proximal hip and LE for self care, mobility, lifting, and ambulation      Manual Treatments:  PROM / STM / Oscillations-Mobs:  G-I, II, III, IV (PA's, Inf., Post.)  [x] (69654) Provided manual therapy to mobilize proximal hip and LS spine soft tissue/joints for the purpose of modulating pain, promoting relaxation,  increasing ROM, reducing/eliminating soft tissue swelling/inflammation/restriction, improving soft tissue extensibility and allowing for proper ROM for normal function with self care, mobility, lifting and ambulation. Charges:  Timed Code Treatment Minutes: 12   Total Treatment Minutes: 47       [] EVAL - LOW (93112)   [] EVAL - MOD (86508)  [] EVAL - HIGH (41980)  [] RE-EVAL (83402)  [] HD(44610) x  1    [] Ionto  [] NMR (78492) x      [] Vaso  [x] Manual (83811) x 1    [] Ultrasound  [] TA x 1       [] Mech Traction (82266)  [] Aquatic Therapy x      [] ES (un) (93651):   [] Home Management Training x  [x] ES(attended) (18627)   [] Dry Needling 1-2 muscles (20956):  [x] Dry Needling 3+ muscles (428236  [] Group:      [] Other:       GOALS:  Patient stated goal: Hip/leg strengthening  [x] Progressing: [] Met: [] Not Met: [] Adjusted    Therapist goals for Patient:   Short Term Goals: To be achieved in: 2 weeks  1. Independent in HEP and progression per patient tolerance, in order to prevent re-injury. [] Progressing: [x] Met: [] Not Met: [] Adjusted  2. Patient will have a decrease in pain to facilitate improvement in movement, function, and ADLs as indicated by Functional Deficits. [] Progressing: [x] Met: [] Not Met: [] Adjusted    Long Term Goals: To be achieved in: 8-10 weeks  1. Disability index score of 20% or less for the SHRUTI to assist with reaching prior level of function. [] Progressing: [x] Met: [] Not Met: [] Adjusted  2. Patient will demonstrate increased AROM to WNL, good LS mobility, good hip ROM to allow for proper joint functioning as indicated by patient's ability to perform squatting for IADLS with 24% decrease in pain,  [x] Progressing: [] Met: [] Not Met: [] Adjusted  3. Patient will demonstrate an increase in Strength to good proximal hip and core activation to allow for proper functional mobility as indicated by patient' sability to walk on uneven ground/grass without LOB to play with grandkids  [x] Progressing: [] Met: [] Not Met: [] Adjusted  4.  Patient will return to functional activities including ascending/descending stairs reciprocally without rail X12  without increased symptoms or restriction. [x] Progressing: [] Met: [] Not Met: [] Adjusted  5. Patient to be able to  Participate in hiking on mild to moderate hills with  for short 20 minute hikes by discharge with minimal to no pain. [x] Progressing: [] Met: [] Not Met: [] Adjusted     Overall Progression Towards Functional goals/ Treatment Progress Update:  [x] Patient is progressing as expected towards functional goals listed. [] Progression is slowed due to complexities/Impairments listed. [] Progression has been slowed due to co-morbidities. [] Plan just implemented, too soon to assess goals progression <30days   [] Goals require adjustment due to lack of progress  [] Patient is not progressing as expected and requires additional follow up with physician  [] Other    Persisting Functional Limitations/Impairments:  [x]Sitting [x]Standing   [x]Walking [x]Squatting/bending    [x]Stairs []ADL's    [x]Transfers [x]Reaching  []Housework []Job related tasks  []Driving []Sports/Recreation   []Sleeping []Other:    ASSESSMENT:  6/24: Patient noted to have increased restrictions at L4/L5 into R Rotation as well as extension. Patient has increased myofascial bands in L QL as well as TP in B medial piriformis,  DN combined with ESTIM in L4/L5 pathways to decrease pain and improve muscle mobility . Able to perform Lumbar Grade 4 mobs in to R rotation with improved ROM into Passive Lumbar extension. She still has occasional numbness in her L posterior thigh/L great toe. Patient may continue to benefit from skilled PT to address her impaiirments and functional limitations and restore her to her PLOF.       Treatment/Activity Tolerance:  [x] Patient able to complete tx  [] Patient limited by fatique  [] Patient limited by pain  [] Patient limited by other medical complications  [] Other:     Prognosis: [x] Good [] Fair  [] Poor    Patient Requires Follow-up: [x] Yes  [] No    PLAN: See eval. PT 2x / week for 8-10 weeks. [x] Continue per plan of care [] Alter current plan (see comments)  [] Plan of care initiated [] Hold pending MD visit [] Discharge    Electronically signed by: HAN Linares, SPT  Therapist was present, directed the patient's care, made skilled judgement, and was responsible for assessment and treatment of the patient. Note: If patient does not return for scheduled/ recommended follow up visits, this note will serve as a discharge from care along with most recent update on progress.

## 2021-06-29 ENCOUNTER — HOSPITAL ENCOUNTER (OUTPATIENT)
Dept: PHYSICAL THERAPY | Age: 71
Setting detail: THERAPIES SERIES
Discharge: HOME | End: 2021-06-29
Payer: MEDICARE

## 2021-06-29 PROCEDURE — 97032 APPL MODALITY 1+ESTIM EA 15: CPT

## 2021-06-29 PROCEDURE — 97112 NEUROMUSCULAR REEDUCATION: CPT

## 2021-06-29 PROCEDURE — 20561 NDL INSJ W/O NJX 3+ MUSC: CPT

## 2021-06-29 NOTE — FLOWSHEET NOTE
[address] [facility]  Phone: [phone]   Fax: [phone]    Physical Therapy Treatment Note:     Date:  2021    Patient Name:  Reyna Plata    :  1950  MRN: [id number]  Restrictions/Precautions:    Medical/Treatment Diagnosis Information:  · Diagnosis: L Hip Intertrochanteric Bursistis M70.62; M51.36 Lumbar DDD, Lumbar Foraminal Stenosis M48.061  · Treatment Diagnosis: Lumbosacral POstural Malalignments with decreased trunk/hip motion with Limited positions tolerance and pain with transitional movements especially with low levels  Insurance/Certification information:  PT Insurance Information: Medicare; [facility]  Physician Information:  Referring Practitioner: Dr. Moon Like of care signed (Y/N): [x]  Yes []  No    Date of Patient follow up with Physician:      Progress Report: []  Yes  [x]  No     Date Range for reporting period:  Beginnin2021  PN: 2021  Ending:     Progress report due (10 Rx/or 30 days whichever is less):  Done  /Recertification due (POC duration/ or 90 days whichever is less):  2021    /Visit # Insurance Allowable Auth required? Date Range   10/15- Medicare [address]  []  Yes  [x]  No      Latex Allergy:  [x]NO      []YES  Preferred Language for Healthcare:   [x]English       []other:    Functional Scale:       Date assessed:    Oswestry: raw score = 59; dysfunction = 26.25%  2021   Oswestry: raw score = 8; dysfunction = 16.00%  2021   Pain level; 2/10 Outer L thigh     Lumbar MRI results:   CONCLUSION:   1. At L3-4, minimal, grade 1 retrolisthesis. Left foraminal protrusion with thin annular    fissure and low-grade left foraminal stenosis.  Mild to moderate right and mild left facet    hypertrophy with ligamentum flavum thickening. 2. At L4-5, disc space narrowing. Left foraminal protrusion with low-grade left foraminal    stenosis.  Right parasagittal to extraforaminal mixed spondylotic protrusion, with adjacent    endplate hypertrophy.  Mild to moderate right foraminal stenosis with mild abutment of exiting    right L4 nerve root. Right lateral recess stenosis and mild abutment of transiting right L5    nerve root. Mild bilateral facet hypertrophy and ligamentum flavum thickening. Low-grade spinal    stenosis. 3. At L5-S1, left foraminal mixed spondylotic protrusion with adjacent endplate hypertrophy.     Low-grade left foraminal stenosis. Right foraminal mixed spondylotic displacement with adjacent    endplate hypertrophy.  Mild to moderate right foraminal stenosis with abutment of exiting    right L5 nerve root. LE MRI 4/14:  CONCLUSION:   1. Low-grade left hip chondromalacia. 2. Short, thin anterolateral left acetabular labral tear.  Separate posterior labral tear with    a large 5.5 x 1.3 x 1.4 cm paralabral cyst extending posterior to the left acetabulum and deep    to the left gluteus medius muscle. 3. Mild to moderate distal left gluteus minimus tendinosis with few small, thin/low-grade    partial-thickness and interstitial tears.  Low-grade distal left gluteus medius tendon strain,    with an associated 1.5 x 0.4 x 1 cm chronic avulsion fracture fragment near the left greater    trochanter. 4. Low-grade left hamstring origin tendinosis with mild left ischiofemoral impingement       SUBJECTIVE  6/29: pt sates she is doing good and not really experiencing any pain. Pt reports DN and E-stim helped. Tingling and numbness in legs has reduced and occur much less often. She still has some in L great toe and 4th toe. Tingling in 4th toe began this morning.     OBJECTIVE:   Test measurements:   6/28: L4 FRSL, L5 FRSL, LEAH sacral torsion  6/22: PN: Postural alignment:  R innominate SI, [id number], L4ERSL, LOL SACRAL TORSION, L SI POSTERIOR INNOMINATE SI, R SI ANTERIOR INNOMINATE SI  LE STrength: L hip IR 4+/5, ER 4/5, Gluteus medius 4+/5; PIVM on L5 significantly limited    RESTRICTIONS/PRECAUTIONS: dizziness, cervical DDD, L hip labral tears, see mRIs above     Exercises/Interventions:     Therapeutic Exercise (25129) Resistance / level Sets / Seconds Reps Notes / Cues   Hookline B Hip ER lime 2 8 HEP   Hookline R and R Hip ER only; L iso lime 1 10 HEP   Hookline Bridges   2 12 HEP    1 12 HEP    1 10 L/R HEP   Clam shell 2 10 R    L B squats  70#    R Single leg   L Single Leg  3      1  1 10      10  10 5/13   Standing HS and hip flex  stretch  30\" 3 L/R     Nustep Level 1 5.5'  S=10, arms = 10, L4   Mat ex  Fig 4 piriformis and ITB stretches    3   30\"R/L    Recumbent Bike  5'     L       SL bridges  2 5 6/15 added   Sustained SL with Contralateral LAQ  2 10 6/15 added   \"    1  1   10  10 6/15 added   SLR  Abduction  extension    1  1   10 R/L  10 R/L    TRX Squat with chair tap  2 10    Prone press ups lumbar etxtension  1/10: 10 6/22HEP   IB   Gastoc   Soleus     2  2   30\" R/L  30\" R/L 6/24                        Therapeutic Activities (04708)       4/28/21 Pt was educated on PT POC, Diagnosis, Prognosis, pathomechanics as well as frequency and duration of scheduling future physical therapy appointments. Time was also taken on this day to answer all patient questions and participation in PT.                                       Neuromuscular Re-ed (50575)       Prone alternating leg lifts with multifidus activation  1 20 6/29    2 10 L 5/11   2 10  5/13   Hookline bridge with neutral pelvis 5/13   Total Gym2    1 10    10 L/R 5/18   Standing B GH ExtLime green 2 10 5/18   Blue2 10 5/18   1 10 5/18    1 10 5/20   4\" 2 10 5/25   4\" 2 10 5/25   Neutral pelvic tilt    Single marches /alt    Opp Le marches /Level 1 A       1        1       10 R/L        10 R/L 6/15   Contralateral arm/leg with foam    Ipsilateral arm/leg with foam   1        1   10 R/L        10 R/L Added 6/15   Standing Resisted Back ext with Swiss Ball 5\" 12 6/29               Airex:   Stagger stance L/R    Head turns with stagger stance 30\"          1 3 L/R          30 L/R 5/25   Manual Intervention (69342)       Prone PA L1-L5 Grade 3    6/22   GISTM/STM  h  as well as STM X10' R sidelying    Lumbar Manip Gapping to R L4/5      SI Manip L unilateral sacral respiration mobs, L sacral Grade 4 mob, sacral nutation respiration mobs      Hip belt mobs       Hip LA distraction R LE     MET to correct     See manual section    x15' 6/8                   Dry needling manual therapy:   5/3 received permission to dry needle pt at gluteals and /or multifidi  from Dr. Lum Soulier as long as pt tolerates. 6/28: Dry needling manual therapy: consisted on the placement of 8 needles in the following muscles:  multifidi of L3,/L4/L5 L and R, L/R piriformis. 60  mm needle in multifidi and 75 mm needle in piriformis was inserted, piston, rotated, and coned to produce intramuscular mobilization. These techniques were used to restore functional range of motion, reduce muscle spasm and induce healing in the corresponding musculature. (31725)  Clean Technique was utilized today while applying Dry needling treatment. The treatment sites where cleaned with 70% solution of  isopropyl alcohol . The PT washed their hands and utilized treatment gloves along with hand  prior to inserting the needles. All needles where removed and discarded in the appropriate sharps container. Attended low frequency (1-20Hz) electrical stimulation was utilized in conjunction with Dry Needling:  the Estim was manipulated between all above needles for a period of 10 min. at  3-4 volts. The low frequency electrical stimulation was used to help reduce muscle spasm and help to interrupt /[place]ubach the pain cycle. (25637)         6/24: Dry needling manual therapy: consisted on the placement of 9 needles in the following muscles: L quadratus lumborum, L/R lower lumbar mutlifidi, L/R Glut med, and L/R piriformis.  60 and 75 mm needles were inserted, piston, rotated, and coned to produce intramuscular mobilization. These techniques were used to restore functional range of motion, reduce muscle spasm and induce healing in the corresponding musculature. (72474)  Clean Technique was utilized today while applying Dry needling treatment. The treatment sites where cleaned with 70% solution of  isopropyl alcohol . The PT washed their hands and utilized treatment gloves along with hand  prior to inserting the needles. All needles where removed and discarded in the appropriate sharps container. .   Attended low frequency (1-20Hz) electrical stimulation was utilized in conjunction with Dry Needling:  the Estim was manipulated between all above needles for a period of 10 min. at  3-4 volts. The low frequency electrical stimulation was used to help reduce muscle spasm and help to interrupt /[place] the pain cycle. (32220)     6/22:Dry needling manual therapy: consisted on the placement of 4 needles in the following muscles:  L/R L4 and L5 region lumbar multifidi,   A 60 mm needle was inserted and coned to produce intramuscular mobilization. These techniques were used to restore functional range of motion, reduce muscle spasm and induce healing in the corresponding musculature. (41138)  Clean Technique was utilized today while applying Dry needling treatment. The treatment sites where cleaned with 70% solution of  isopropyl alcohol . The PT washed their hands and utilized treatment gloves along with hand  prior to inserting the needles. All needles where removed and discarded in the appropriate sharps container.           Modalities:   Manual:   6/28: MET to correct L4 FRSL, L5 FRSL, LEAH sacral torsion, L SI posterio rinnominate SI  6/24: PAs to L TPs of L4/L5; L4/L5 L sidelying Manipulations, PAS L1-L5 GRade III  6/22: After R LA to correct R innominate upslip,  MET to correct [id number]/FRSL, L4ERSL, LOL SACRAL TORSION, L SI POSTERIOR INNOMINATE SI, R SI ANTERIOR INNOMINATE SI  6/15: MET to correct L5 ERSL  6/14: Left Sidelying gapping technique to ERSL at L5,  5/20: After R Leg pull to correct R innominate upslip, MET to correct L5FRSR, LOL sacral torsionR SI posterior innominate SI, L SI anterior innominate SI  5/18: MET to correct L5 FRSR, ROR sacral torsion, R SI posterior innominate SI, L SI anterior innominate SI  5/13: MET to correct L4 FRSR, L5 FRSL, LEAH sacral torsion    Pt. Education:  -pt educated on diagnosis, prognosis and expectations for rehab  -all pt questions were answered    Home Exercise Prorgam:  Access Code: [email or web address]  URL: [email or web address]  Date: 06/15/2021  [facility]    Exercises  Sidelying Hip Abduction - 1 x daily - 7 x weekly - 10 reps - 3 sets  Access Code: [email or web address]  URL: [email or web address]  Date: 05/18/2021  [facility]: [facility]    Exercises  Anti-Rotation Sidestepping with Resistance - 1 x daily - 7 x weekly - 10 reps - 3 sets  Standing Row with Resistance - 1 x daily - 7 x weekly - 10 reps - 3 sets  Shoulder Extension with Resistance - 1 x daily - 7 x weekly - 10 reps - 3 sets  Access Code: Yen Spauldingchild: [email or web address]/Date: 04/28/2021Prepared by:  Daphne Saenz   Hooklying Clamshell with Resistance - 1 x daily - 7 x weekly - 3 sets - 10 reps   Hooklying Isometric Clamshell - 1 x daily - 7 x weekly - 2 sets - 10 reps   Supine Bridge with Resistance Band - 1 x daily - 7 x weekly - 2 sets - 10 reps   Prone Hip Extension on Table - 1 x daily - 7 x weekly - 2-3 sets - 10 reps   Supine Alternating Shoulder Flexion - 1 x daily - 7 x weekly - 3 sets - 10 reps      Therapeutic Exercise and NMR EXR  [x] (92800) Provided verbal/tactile cueing for activities related to strengthening, flexibility, endurance, ROM  for improvements in proximal hip and core control with self care, mobility, lifting and ambulation.  [] (23827) Provided verbal/tactile cueing for activities related to improving balance, coordination, kinesthetic sense, posture, motor skill, proprioception  to assist with core control in self care, mobility, lifting, and ambulation.   [] (75166) Therapist is in constant attendance of 2 or more patients providing skilled therapy interventions, but not providing any significant amount of measurable one-on-one time to either patient, for improvements in LE, proximal hip, and core control in self care, mobility, lifting, ambulation and eccentric single leg control. Therapeutic Activities:    [x] (12782 or 91725) Provided verbal/tactile cueing for activities related to improving balance, coordination, kinesthetic sense, posture, motor skill, proprioception and motor activation to allow for proper function  with self care and ADLs  [] (15722) Provided training and instruction to the patient for proper core and proximal hip recruitment and positioning with ambulation re-education     Home Exercise Program:    [x] (14072) Reviewed/Progressed HEP activities related to strengthening, flexibility, endurance, ROM of core, proximal hip and LE for functional self-care, mobility, lifting and ambulation   [] (62146) Reviewed/Progressed HEP activities related to improving balance, coordination, kinesthetic sense, posture, motor skill, proprioception of core, proximal hip and LE for self care, mobility, lifting, and ambulation      Manual Treatments:  PROM / STM / Oscillations-Mobs:  G-I, II, III, IV (PA's, Inf., Post.)  [x] (85190) Provided manual therapy to mobilize proximal hip and LS spine soft tissue/joints for the purpose of modulating pain, promoting relaxation,  increasing ROM, reducing/eliminating soft tissue swelling/inflammation/restriction, improving soft tissue extensibility and allowing for proper ROM for normal function with self care, mobility, lifting and ambulation.        Charges:  Timed Code Treatment Minutes: 12   Total Treatment Minutes: 47       [] EVAL - LOW (62496)   [] EVAL - MOD (59010)  [] EVAL - HIGH (59581)  [] RE-EVAL (10243)  [] HU(08731) x  1    [] Ionto  [x] NMR (87995) x1    [] Vaso  [] Manual (32487) x 1    [] Ultrasound  [] TA x 1       [] Mech Traction (99392)  [] Aquatic Therapy x      [] ES (un) (75096):   [] Home Management Training x  [x] ES(attended) (21084)   [] Dry Needling 1-2 muscles (92184):  [x] Dry Needling 3+ muscles (529428  [] Group:      [] Other:       GOALS:  Patient stated goal: Hip/leg strengthening  [x] Progressing: [] Met: [] Not Met: [] Adjusted    Therapist goals for Patient:   Short Term Goals: To be achieved in: 2 weeks  1. Independent in HEP and progression per patient tolerance, in order to prevent re-injury. [] Progressing: [x] Met: [] Not Met: [] Adjusted  2. Patient will have a decrease in pain to facilitate improvement in movement, function, and ADLs as indicated by Functional Deficits. [] Progressing: [x] Met: [] Not Met: [] Adjusted    Long Term Goals: To be achieved in: 8-10 weeks  1. Disability index score of 20% or less for the SHRUTI to assist with reaching prior level of function. [] Progressing: [x] Met: [] Not Met: [] Adjusted  2. Patient will demonstrate increased AROM to WNL, good LS mobility, good hip ROM to allow for proper joint functioning as indicated by patient's ability to perform squatting for IADLS with 92% decrease in pain,  [x] Progressing: [] Met: [] Not Met: [] Adjusted  3. Patient will demonstrate an increase in Strength to good proximal hip and core activation to allow for proper functional mobility as indicated by patient' sability to walk on uneven ground/grass without LOB to play with grandkids  [x] Progressing: [] Met: [] Not Met: [] Adjusted  4. Patient will return to functional activities including ascending/descending stairs reciprocally without rail X12  without increased symptoms or restriction. [x] Progressing: [] Met: [] Not Met: [] Adjusted  5.  Patient to be able to  Participate in hiking on mild to moderate hills with  for short 20 minute hikes by discharge with minimal to no pain. [x] Progressing: [] Met: [] Not Met: [] Adjusted     Overall Progression Towards Functional goals/ Treatment Progress Update:  [x] Patient is progressing as expected towards functional goals listed. [] Progression is slowed due to complexities/Impairments listed. [] Progression has been slowed due to co-morbidities. [] Plan just implemented, too soon to assess goals progression <30days   [] Goals require adjustment due to lack of progress  [] Patient is not progressing as expected and requires additional follow up with physician  [] Other    Persisting Functional Limitations/Impairments:  [x]Sitting [x]Standing   [x]Walking [x]Squatting/bending    [x]Stairs []ADL's    [x]Transfers [x]Reaching  []Housework []Job related tasks  []Driving []Sports/Recreation   []Sleeping []Other:    ASSESSMENT:  6/29: Patient progressing with decreased overall referred numbness/tingling into L LE/foot as well as improved lumbar extension. Patient was moderately challenged wiith Prone and standing Trunk extension/multifidus activation exercises. Patient will have 1-2 weeks off PT due to scheduling conflicts and will return . REassess overalltrunk AROM and progress core/hip strength as able    Patient may continue to benefit from skilled PT to address her impaiirments and functional limitations and restore her to her PLOF. Treatment/Activity Tolerance:  [x] Patient able to complete tx  [] Patient limited by fatique  [] Patient limited by pain  [] Patient limited by other medical complications  [] Other:     Prognosis: [x] Good [] Fair  [] Poor    Patient Requires Follow-up: [x] Yes  [] No    PLAN: See eval. PT 2x / week for 8-10 weeks.    [x] Continue per plan of care [] Alter current plan (see comments)  [] Plan of care initiated [] Hold pending MD visit [] Discharge    Electronically signed by: Cy Burton PT BOOGIE Agee Sell, SPT  Therapist was present, directed the patient's care, made skilled judgement, and was responsible for assessment and treatment of the patient. Note: If patient does not return for scheduled/ recommended follow up visits, this note will serve as a discharge from care along with most recent update on progress.

## 2021-07-09 ENCOUNTER — HOSPITAL ENCOUNTER (OUTPATIENT)
Dept: PHYSICAL THERAPY | Age: 71
Setting detail: THERAPIES SERIES
Discharge: HOME | End: 2021-07-09
Payer: MEDICARE

## 2021-07-09 PROCEDURE — 97112 NEUROMUSCULAR REEDUCATION: CPT

## 2021-07-09 PROCEDURE — 20560 NDL INSJ W/O NJX 1 OR 2 MUSC: CPT

## 2021-07-09 PROCEDURE — 97110 THERAPEUTIC EXERCISES: CPT

## 2021-07-09 NOTE — FLOWSHEET NOTE
Reggie Ulloa  Phone: [phone]   Fax: [phone]    Physical Therapy Treatment Note:     Date:  2021    Patient Name:  Negrita Hagan    :  1950  MRN: [id number]  Restrictions/Precautions:    Medical/Treatment Diagnosis Information:  · Diagnosis: L Hip Intertrochanteric Bursistis M70.62; M51.36 Lumbar DDD, Lumbar Foraminal Stenosis M48.061  · Treatment Diagnosis: Lumbosacral POstural Malalignments with decreased trunk/hip motion with Limited positions tolerance and pain with transitional movements especially with low levels  Insurance/Certification information:  PT Insurance Information: Medicare; [facility]  Physician Information:  Referring Practitioner: Dr. Felipa Underwood of care signed (Y/N): [x]  Yes []  No    Date of Patient follow up with Physician:      Progress Report: []  Yes  [x]  No     Date Range for reporting period:  Beginnin2021  PN: 2021  Ending:     Progress report due (10 Rx/or 30 days whichever is less):  Done  /Recertification due (POC duration/ or 90 days whichever is less):  2021    /Visit # Insurance Allowable Auth required? Date Range   11/15- Medicare [address]  []  Yes  [x]  No      Latex Allergy:  [x]NO      []YES  Preferred Language for Healthcare:   [x]English       []other:    Functional Scale:       Date assessed:    Oswestry: raw score = 59; dysfunction = 26.25%  2021   Oswestry: raw score = 8; dysfunction = 16.00%  2021   Pain level; 2/10 Outer L thigh     Lumbar MRI results:   CONCLUSION:   1. At L3-4, minimal, grade 1 retrolisthesis. Left foraminal protrusion with thin annular    fissure and low-grade left foraminal stenosis.  Mild to moderate right and mild left facet    hypertrophy with ligamentum flavum thickening. 2. At L4-5, disc space narrowing. Left foraminal protrusion with low-grade left foraminal    stenosis.  Right parasagittal to extraforaminal mixed spondylotic protrusion, with adjacent    endplate hypertrophy.  Mild to moderate right foraminal stenosis with mild abutment of exiting    right L4 nerve root. Right lateral recess stenosis and mild abutment of transiting right L5    nerve root. Mild bilateral facet hypertrophy and ligamentum flavum thickening. Low-grade spinal    stenosis. 3. At L5-S1, left foraminal mixed spondylotic protrusion with adjacent endplate hypertrophy.     Low-grade left foraminal stenosis. Right foraminal mixed spondylotic displacement with adjacent    endplate hypertrophy.  Mild to moderate right foraminal stenosis with abutment of exiting    right L5 nerve root. LE MRI 4/14:  CONCLUSION:   1. Low-grade left hip chondromalacia. 2. Short, thin anterolateral left acetabular labral tear.  Separate posterior labral tear with    a large 5.5 x 1.3 x 1.4 cm paralabral cyst extending posterior to the left acetabulum and deep    to the left gluteus medius muscle. 3. Mild to moderate distal left gluteus minimus tendinosis with few small, thin/low-grade    partial-thickness and interstitial tears.  Low-grade distal left gluteus medius tendon strain,    with an associated 1.5 x 0.4 x 1 cm chronic avulsion fracture fragment near the left greater    trochanter. 4. Low-grade left hamstring origin tendinosis with mild left ischiofemoral impingement       SUBJECTIVE  6/29: pt sates she is doing good and not really experiencing any pain. Pt reports DN and E-stim helped. Tingling and numbness in legs has reduced and occur much less often. She still has some in L great toe and 4th toe. Tingling in 4th toe began this morning.     OBJECTIVE:  Test measurements:   6/28: L4 FRSL, L5 FRSL, LEAH sacral torsion  6/22: PN: Postural alignment:  R innominate SI, E5SSAXS/FRSL, L4ERSL, LOL SACRAL TORSION, L SI POSTERIOR INNOMINATE SI, R SI ANTERIOR INNOMINATE SI  LE STrength: L hip IR 4+/5, ER 4/5, Gluteus medius 4+/5; PIVM on L5 significantly limited    RESTRICTIONS/PRECAUTIONS: dizziness, cervical DDD, L hip labral tears, see mRIs above     Exercises/Interventions:     Therapeutic Exercise (31753) Resistance / level Sets / Seconds Reps Notes / Cues   Hookline B Hip ER lime 2 8 HEP   Hookline R and R Hip ER only; L iso lime 1 10 HEP   Hookline Bridges   2 12 HEP    1 12 HEP    1 10 L/R HEP   Clam shell 2 10 R    L B squats  70#    R Single leg   L Single Leg  3      1  1 10      10  10 5/13   Standing HS and hip flex  stretch  30\" 3 L/R     Nustep Level 1 5.5'  S=10, arms = 10, L4   Mat ex  Fig 4 piriformis and ITB stretches    3   30\"R/L    Recumbent Bike  5'     L       SL bridges  2 5 6/15 added   Sustained SL with Contralateral LAQ  2 10 6/15 added   \"    1  1   10  10 6/15 added   SLR  Abduction  extension    1  1   10 R/L  10 R/L    TRX Squat with chair tap  2 10    Prone press ups lumbar etxtension  1/10: 10 6/22HEP   IB   Gastoc   Soleus     2  2   30\" R/L  30\" R/L 6/24                        Therapeutic Activities (85158)       4/28/21 Pt was educated on PT POC, Diagnosis, Prognosis, pathomechanics as well as frequency and duration of scheduling future physical therapy appointments. Time was also taken on this day to answer all patient questions and participation in PT.                                       Neuromuscular Re-ed (19890)       Prone alternating leg lifts with multifidus activation  1 20 6/29    2 10 L 5/11   2 10  5/13   Hookline bridge with neutral pelvis 5/13   Total Gym2    1 10    10 L/R 5/18   Standing B GH ExtLime green 2 10 5/18   Blue2 10 5/18   1 10 5/18    1 10 5/20   4\" 2 10 5/25   4\" 2 10 5/25   Neutral pelvic tilt    Single marches /alt    Opp Le marches /Level 1 A       1        1       10 R/L        10 R/L 6/15   Contralateral arm/leg with foam    Ipsilateral arm/leg with foam   1        1   10 R/L        10 R/L Added 6/15   Standing Resisted Back ext with Swiss Ball 5\" 12    Standing Plank hip ext/multifidus  1 40 L/R 7/9         Airex:   Stagger stance L/R    Head turns with stagger stance 30\"          1 3 L/R          30 L/R 5/25   Manual Intervention (83198)       Prone PA L1-L5 Grade 3    6/22   GISTM/STM  h  as well as STM X10' R sidelying    Lumbar Manip Gapping to R L4/5      SI Manip L unilateral sacral respiration mobs, L sacral Grade 4 mob, sacral nutation respiration mobs      Hip belt mobs       Hip LA distraction R LE     MET to correct     See manual section    x15' 6/8                   Dry needling manual therapy:   5/3 received permission to dry needle pt at gluteals and /or multifidi  from Dr. Demond Madison as long as pt tolerates. 7/9: Dry needling manual therapy: consisted on the placement of 2 needles in the following muscles:  L piriformis and deep hip rotators. 75 mm needles were inserted, piston, rotated, and coned to produce intramuscular mobilization. These techniques were used to restore functional range of motion, reduce muscle spasm and induce healing in the corresponding musculature. (01584) Clean Technique was utilized today while applying Dry needling treatment. The treatment sites where cleaned with 70% solution of  isopropyl alcohol . The PT washed their hands and utilized treatment gloves along with hand  prior to inserting the needles. All needles where removed and discarded in the appropriate sharps container. 6/28: Dry needling manual therapy: consisted on the placement of 8 needles in the following muscles:  multifidi of L3,/L4/L5 L and R, L/R piriformis. 60  mm needle in multifidi and 75 mm needle in piriformis was inserted, piston, rotated, and coned to produce intramuscular mobilization. These techniques were used to restore functional range of motion, reduce muscle spasm and induce healing in the corresponding musculature. (77739)  Clean Technique was utilized today while applying Dry needling treatment.   The treatment sites where cleaned with 70% solution of isopropyl alcohol . The PT washed their hands and utilized treatment gloves along with hand  prior to inserting the needles. All needles where removed and discarded in the appropriate sharps container. Attended low frequency (1-20Hz) electrical stimulation was utilized in conjunction with Dry Needling:  the Estim was manipulated between all above needles for a period of 10 min. at  3-4 volts. The low frequency electrical stimulation was used to help reduce muscle spasm and help to interrupt /[place] the pain cycle. (93263)     6/24: Dry needling manual therapy: consisted on the placement of 9 needles in the following muscles: L quadratus lumborum, L/R lower lumbar mutlifidi, L/R Glut med, and L/R piriformis. 60 and 75 mm needles were inserted, piston, rotated, and coned to produce intramuscular mobilization. These techniques were used to restore functional range of motion, reduce muscle spasm and induce healing in the corresponding musculature. (34388)  Clean Technique was utilized today while applying Dry needling treatment. The treatment sites where cleaned with 70% solution of  isopropyl alcohol . The PT washed their hands and utilized treatment gloves along with hand  prior to inserting the needles. All needles where removed and discarded in the appropriate sharps container. .   Attended low frequency (1-20Hz) electrical stimulation was utilized in conjunction with Dry Needling:  the Estim was manipulated between all above needles for a period of 10 min. at  3-4 volts. The low frequency electrical stimulation was used to help reduce muscle spasm and help to interrupt /[place] the pain cycle.  (89069)       Modalities:   Manual:   7/9: MET to correct L5FRSR, ROL sacral torsion, R SI posterior innominate SI  6/28: MET to correct L4 FRSL, L5 FRSL, LEAH sacral torsion, L SI posterio rinnominate SI  6/24: PAs to L TPs of L4/L5; L4/L5 L sidelying Manipulations, PAS L1-L5 GRade III  6/22: After proprioception  to assist with core control in self care, mobility, lifting, and ambulation.   [] (77729) Therapist is in constant attendance of 2 or more patients providing skilled therapy interventions, but not providing any significant amount of measurable one-on-one time to either patient, for improvements in LE, proximal hip, and core control in self care, mobility, lifting, ambulation and eccentric single leg control. Therapeutic Activities:    [x] (56894 or 35528) Provided verbal/tactile cueing for activities related to improving balance, coordination, kinesthetic sense, posture, motor skill, proprioception and motor activation to allow for proper function  with self care and ADLs  [] (94465) Provided training and instruction to the patient for proper core and proximal hip recruitment and positioning with ambulation re-education     Home Exercise Program:    [x] (09016) Reviewed/Progressed HEP activities related to strengthening, flexibility, endurance, ROM of core, proximal hip and LE for functional self-care, mobility, lifting and ambulation   [] (11020) Reviewed/Progressed HEP activities related to improving balance, coordination, kinesthetic sense, posture, motor skill, proprioception of core, proximal hip and LE for self care, mobility, lifting, and ambulation      Manual Treatments:  PROM / STM / Oscillations-Mobs:  G-I, II, III, IV (PA's, Inf., Post.)  [x] (62006) Provided manual therapy to mobilize proximal hip and LS spine soft tissue/joints for the purpose of modulating pain, promoting relaxation,  increasing ROM, reducing/eliminating soft tissue swelling/inflammation/restriction, improving soft tissue extensibility and allowing for proper ROM for normal function with self care, mobility, lifting and ambulation.        Charges:  Timed Code Treatment Minutes: 25   Total Treatment Minutes: 55       [] EVAL - LOW (77935)   [] EVAL - MOD (06911)  [] EVAL - HIGH (27935)  [] Diamond Pruitt (86797)  [x] FC(11238) x  1    [] Ionto  [x] NMR (84508) x1    [] Vaso  [x] Manual (80307) x 1    [] Ultrasound  [] TA x 1       [] Mech Traction (32316)  [] Aquatic Therapy x      [] ES (un) (51953):   [] Home Management Training x  [] ES(attended) (22719)   [x] Dry Needling 1-2 muscles (64383):  [] Dry Needling 3+ muscles (182566  [] Group:      [] Other:       GOALS:  Patient stated goal: Hip/leg strengthening  [x] Progressing: [] Met: [] Not Met: [] Adjusted    Therapist goals for Patient:   Short Term Goals: To be achieved in: 2 weeks  1. Independent in HEP and progression per patient tolerance, in order to prevent re-injury. [] Progressing: [x] Met: [] Not Met: [] Adjusted  2. Patient will have a decrease in pain to facilitate improvement in movement, function, and ADLs as indicated by Functional Deficits. [] Progressing: [x] Met: [] Not Met: [] Adjusted    Long Term Goals: To be achieved in: 8-10 weeks  1. Disability index score of 20% or less for the SHRUTI to assist with reaching prior level of function. [] Progressing: [x] Met: [] Not Met: [] Adjusted  2. Patient will demonstrate increased AROM to WNL, good LS mobility, good hip ROM to allow for proper joint functioning as indicated by patient's ability to perform squatting for IADLS with 89% decrease in pain,  [x] Progressing: [] Met: [] Not Met: [] Adjusted  3. Patient will demonstrate an increase in Strength to good proximal hip and core activation to allow for proper functional mobility as indicated by patient' sability to walk on uneven ground/grass without LOB to play with grandkids  [x] Progressing: [] Met: [] Not Met: [] Adjusted  4. Patient will return to functional activities including ascending/descending stairs reciprocally without rail X12  without increased symptoms or restriction. [x] Progressing: [] Met: [] Not Met: [] Adjusted  5.  Patient to be able to  Participate in hiking on mild to moderate hills with  for short 20 minute hikes by discharge with minimal to no pain. [x] Progressing: [] Met: [] Not Met: [] Adjusted     Overall Progression Towards Functional goals/ Treatment Progress Update:  [x] Patient is progressing as expected towards functional goals listed. [] Progression is slowed due to complexities/Impairments listed. [] Progression has been slowed due to co-morbidities. [] Plan just implemented, too soon to assess goals progression <30days   [] Goals require adjustment due to lack of progress  [] Patient is not progressing as expected and requires additional follow up with physician  [] Other    Persisting Functional Limitations/Impairments:  [x]Sitting [x]Standing   [x]Walking [x]Squatting/bending    [x]Stairs []ADL's    [x]Transfers [x]Reaching  []Housework []Job related tasks  []Driving []Sports/Recreation   []Sleeping []Other:    ASSESSMENT: 7/9: Patient has improved with overall staediness especially improved on L LE. Patient has slowly improving Lumbosacral postural alignments with mild R rotation at L5/A1. Moderate myofascial bands and TP in R piriformis and R deep hip external rotators limting pain-free transitional movements and standing. Numbness/tingling still present in L great toe. .  Patient was mildly challenged wiith Prone and standing Trunk extension/multifidus activation exercises. REassess overalltrunk AROM and progress core/hip strength as able    Patient may continue to benefit from skilled PT to address her impaiirments and functional limitations and restore her to her PLOF. Treatment/Activity Tolerance:  [x] Patient able to complete tx  [] Patient limited by fatique  [] Patient limited by pain  [] Patient limited by other medical complications  [] Other:     Prognosis: [x] Good [] Fair  [] Poor    Patient Requires Follow-up: [x] Yes  [] No    PLAN: See eval. PT 2x / week for 8-10 weeks.    [x] Continue per plan of care [] Alter current plan (see comments)  [] Plan of care initiated [] Hold pending MD visit [] Discharge    Electronically signed by: Megan Boucher PT MSPT  Aleja Faria, SPT  Therapist was present, directed the patient's care, made skilled judgement, and was responsible for assessment and treatment of the patient. Note: If patient does not return for scheduled/ recommended follow up visits, this note will serve as a discharge from care along with most recent update on progress.

## 2021-07-19 ENCOUNTER — HOSPITAL ENCOUNTER (OUTPATIENT)
Dept: PHYSICAL THERAPY | Age: 71
Setting detail: THERAPIES SERIES
Discharge: HOME | End: 2021-07-19
Payer: MEDICARE

## 2021-07-19 PROCEDURE — 97112 NEUROMUSCULAR REEDUCATION: CPT

## 2021-07-19 PROCEDURE — 97110 THERAPEUTIC EXERCISES: CPT

## 2021-07-19 NOTE — PROGRESS NOTES
Reggie Ulloa  Phone: [phone]   Fax: [phone]  Physical Therapy Re-Certification Plan of Care    Dear   Dr. Rhonda Pérez,    We had the pleasure of treating the following patient for physical therapy services at [facility]. A summary of our findings can be found in the updated assessment below. This includes our plan of care. If you have any questions or concerns regarding these findings, please do not hesitate to contact me at the office phone number checked above.   Thank you for the referral.     Physician Signature:________________________________Date:__________________  By signing above (or electronic signature), therapist's plan is approved by physician      Functional Outcome:                      Owsestry Disability Total Scores: 4  Oswestry Disability Scores %: 8                           Overall Response to Treatment:   [x]Patient is responding well to treatment and improvement is noted with regards  to goals   []Patient should continue to improve in reasonable time if they continue HEP   []Patient has plateaued and is no longer responding to skilled PT intervention    []Patient is getting worse and would benefit from return to referring MD   []Patient unable to adhere to initial POC   []Other:     Date range of Visits: 2021 - 2021  Total Visits: 12    Recommendation:    [x]Continue PT; extend POC till               []Hold PT, pending MD visit        Physical Therapy Treatment Note:     Date:  2021    Patient Name:  Alhaji Chavez    :  1950  MRN: [id number]  Restrictions/Precautions:    Medical/Treatment Diagnosis Information:  · Diagnosis: L Hip Intertrochanteric Bursistis M70.62; M51.36 Lumbar DDD, Lumbar Foraminal Stenosis M48.061  · Treatment Diagnosis: Lumbosacral POstural Malalignments with decreased trunk/hip motion with Limited positions tolerance and pain with transitional movements especially with low levels  Insurance/Certification information:  PT Insurance Information: Medicare; [facility]  Physician Information:  Referring Practitioner: Dr. Cody Lopez of care signed (Y/N): [x]  Yes []  No    Date of Patient follow up with Physician:      Progress Report: []  Yes  [x]  No     Date Range for reporting period:  Beginnin2021  PN: 2021,   Ending:     Progress report due (10 Rx/or 30 days whichever is BCRE):  /Recertification due (POC duration/ or 90 days whichever is less):  2021; extend till     [facility] # Insurance Allowable Auth required? Date Range   12/15- Medicare [address]  []  Yes  [x]  No      Latex Allergy:  [x]NO      []YES  Preferred Language for Healthcare:   [x]English       []other:    Functional Scale:       Date assessed:    Oswestry: raw score = 59; dysfunction = 26.25%  2021   Oswestry: raw score = 8; dysfunction = 16.00%  2021  Oswestry: raw score = 4; dysfunction = 8.00%  2021     Pain level; 0/10 Outer L thigh     Lumbar MRI results:   CONCLUSION:   1. At L3-4, minimal, grade 1 retrolisthesis. Left foraminal protrusion with thin annular    fissure and low-grade left foraminal stenosis.  Mild to moderate right and mild left facet    hypertrophy with ligamentum flavum thickening. 2. At L4-5, disc space narrowing. Left foraminal protrusion with low-grade left foraminal    stenosis. Right parasagittal to extraforaminal mixed spondylotic protrusion, with adjacent    endplate hypertrophy.  Mild to moderate right foraminal stenosis with mild abutment of exiting    right L4 nerve root. Right lateral recess stenosis and mild abutment of transiting right L5    nerve root. Mild bilateral facet hypertrophy and ligamentum flavum thickening. Low-grade spinal    stenosis. 3. At L5-S1, left foraminal mixed spondylotic protrusion with adjacent endplate hypertrophy.     Low-grade left foraminal stenosis. Right foraminal mixed spondylotic displacement with adjacent    endplate hypertrophy.  Mild to moderate right foraminal stenosis with abutment of exiting    right L5 nerve root. LE MRI 4/14:  CONCLUSION:   1. Low-grade left hip chondromalacia. 2. Short, thin anterolateral left acetabular labral tear.  Separate posterior labral tear with    a large 5.5 x 1.3 x 1.4 cm paralabral cyst extending posterior to the left acetabulum and deep    to the left gluteus medius muscle. 3. Mild to moderate distal left gluteus minimus tendinosis with few small, thin/low-grade    partial-thickness and interstitial tears.  Low-grade distal left gluteus medius tendon strain,    with an associated 1.5 x 0.4 x 1 cm chronic avulsion fracture fragment near the left greater    trochanter. 4. Low-grade left hamstring origin tendinosis with mild left ischiofemoral impingement       SUBJECTIVE  7/19: pt reporting no pain this date and no pain yesterday as well. Pt also with no noticeable tightness. Pt stated she has noticed she feels slightly wobbly. Pt states DN last session helped improved symptoms and there was no lingering pain/soreness. She still reports intermittent tingling in LLE.   OBJECTIVE:  Test measurements: 7/19      R  L  Hip ABD MMT 4/5  4/5  Hip ABD AROM 35*  38*  Hip flexors  4-/5  4-/5    6/28: L4 FRSL, L5 FRSL, LEAH sacral torsion  6/22: PN: Postural alignment:  R innominate SI, L4EGXSM/FRSL, L4ERSL, LOL SACRAL TORSION, L SI POSTERIOR INNOMINATE SI, R SI ANTERIOR INNOMINATE SI  LE STrength: L hip IR 4+/5, ER 4/5, Gluteus medius 4+/5; PIVM on L5 significantly limited    RESTRICTIONS/PRECAUTIONS: dizziness, cervical DDD, L hip labral tears, see mRIs above     Exercises/Interventions:     Therapeutic Exercise (86527) Resistance / level Sets / Seconds Reps Notes / Cues   Hookline B Hip ER lime 2 8 HEP   Hookline R and R Hip ER only; L iso lime 1 10 HEP   Hookline Bridges   2 12 HEP    1 12 HEP    1 10 L/R HEP   Clam shellLime 2 10 R    L B squats  70#    R Single leg   L Single Leg  3      1  1 10      10  10 5/13   Standing HS and hip flex  stretch  30\" 3 L/R     Nustep Level 1 5.5'  S=10, arms = 10, L4   Mat ex  Fig 4 piriformis and ITB stretches    3   30\"R/L    Recumbent Bike  5'     L       SL bridges  2 5 6/15 added   Sustained SL with Contralateral LAQ  2 10 6/15 added   \"    1  1   10  10 6/15 added   SLR in standing  Flexion  Abduction  extension    1  1  1   10 R/L  10 R/L  10 R/L    TRX Squat with chair tap  2 10    Prone press ups lumbar etxtension  1/10: 10 6/22HEP   IB   Gastoc   Soleus     2  2   30\" R/L  30\" R/L 6/24                        Therapeutic Activities (39903)       4/28/21 Pt was educated on PT POC, Diagnosis, Prognosis, pathomechanics as well as frequency and duration of scheduling future physical therapy appointments. Time was also taken on this day to answer all patient questions and participation in PT.                                       Neuromuscular Re-ed (44292)       Prone alternating leg lifts with multifidus activation  1 20 6/29    2 10 L 5/11   2 10  5/13   Hookline bridge with neutral pelvis 5/13   Total Gym2    1 10    10 L/R 5/18   Standing B GH ExtLime green 2 10 5/18   Blue2 10 5/18   1 10 5/18    1 10 5/20   4\" 2 10 5/25   4\" 2 10 5/25   Neutral pelvic tilt    Single marches /alt    Opp Le marches /Level 1 A       1        1       10 R/L        10 R/L 6/15   Contralateral arm/leg with foam    Ipsilateral arm/leg with foam   1        1   10 R/L        10 R/L Added 6/15   Standing Resisted Back ext with Swiss Ball 5\" 12    Psuedo Plank at table hip ext/multifidus  1 40 L/R 7/9   Modified front plank  20\" 3 7/19   Modified side plank  15\" 1 R/L 7/19               Standing Balance Airex:   Stagger stance L/R    Head turns with stagger stance    Floor:  SLS static 30\"          1        10\" 1 L/R          30 L/R        2 R/L 5/25   Manual Intervention (51431)       Prone PA L1-L5 Grade 3 6/22   GISTM/STM  h  as well as STM X10' R sidelying    Lumbar Manip Gapping to R L4/5      SI Manip L unilateral sacral respiration mobs, L sacral Grade 4 mob, sacral nutation respiration mobs      Hip belt mobs       Hip LA distraction R LE     MET to correct     See manual section    x15' 6/8                   Dry needling manual therapy:   5/3 received permission to dry needle pt at gluteals and /or multifidi  from Dr. Mich Shi as long as pt tolerates. 7/9: Dry needling manual therapy: consisted on the placement of 2 needles in the following muscles:  L piriformis and deep hip rotators. 75 mm needles were inserted, piston, rotated, and coned to produce intramuscular mobilization. These techniques were used to restore functional range of motion, reduce muscle spasm and induce healing in the corresponding musculature. (59802) Clean Technique was utilized today while applying Dry needling treatment. The treatment sites where cleaned with 70% solution of  isopropyl alcohol . The PT washed their hands and utilized treatment gloves along with hand  prior to inserting the needles. All needles where removed and discarded in the appropriate sharps container. 6/28: Dry needling manual therapy: consisted on the placement of 8 needles in the following muscles:  multifidi of L3,/L4/L5 L and R, L/R piriformis. 60  mm needle in multifidi and 75 mm needle in piriformis was inserted, piston, rotated, and coned to produce intramuscular mobilization. These techniques were used to restore functional range of motion, reduce muscle spasm and induce healing in the corresponding musculature. (86143)  Clean Technique was utilized today while applying Dry needling treatment. The treatment sites where cleaned with 70% solution of  isopropyl alcohol . The PT washed their hands and utilized treatment gloves along with hand  prior to inserting the needles.   All needles where removed and discarded in the appropriate sharps container. Attended low frequency (1-20Hz) electrical stimulation was utilized in conjunction with Dry Needling:  the Estim was manipulated between all above needles for a period of 10 min. at  3-4 volts. The low frequency electrical stimulation was used to help reduce muscle spasm and help to interrupt /[place] the pain cycle. (45553)     6/24: Dry needling manual therapy: consisted on the placement of 9 needles in the following muscles: L quadratus lumborum, L/R lower lumbar mutlifidi, L/R Glut med, and L/R piriformis. 60 and 75 mm needles were inserted, piston, rotated, and coned to produce intramuscular mobilization. These techniques were used to restore functional range of motion, reduce muscle spasm and induce healing in the corresponding musculature. (48088)  Clean Technique was utilized today while applying Dry needling treatment. The treatment sites where cleaned with 70% solution of  isopropyl alcohol . The PT washed their hands and utilized treatment gloves along with hand  prior to inserting the needles. All needles where removed and discarded in the appropriate sharps container. .   Attended low frequency (1-20Hz) electrical stimulation was utilized in conjunction with Dry Needling:  the Estim was manipulated between all above needles for a period of 10 min. at  3-4 volts. The low frequency electrical stimulation was used to help reduce muscle spasm and help to interrupt /[place] the pain cycle.  (28169)       Modalities:   Manual:   7/9: MET to correct L5FRSR, ROL sacral torsion, R SI posterior innominate SI  6/28: MET to correct L4 FRSL, L5 FRSL, LEAH sacral torsion, L SI posterio rinnominate SI  6/24: PAs to L TPs of L4/L5; L4/L5 L sidelying Manipulations, PAS L1-L5 GRade III  6/22: After R LA to correct R innominate upslip,  MET to correct X0VHTDO/FRSL, L4ERSL, LOL SACRAL TORSION, L SI POSTERIOR INNOMINATE SI, R SI ANTERIOR INNOMINATE SI  6/15: MET to correct L5 ERSL  6/14: Left Sidelying gapping technique to ERSL at L5,  5/20: After R Leg pull to correct R innominate upslip, MET to correct L5FRSR, LOL sacral torsionR SI posterior innominate SI, L SI anterior innominate SI  5/18: MET to correct L5 FRSR, ROR sacral torsion, R SI posterior innominate SI, L SI anterior innominate SI  5/13: MET to correct L4 FRSR, L5 FRSL, LEAH sacral torsion    Pt. Education:  -pt educated on diagnosis, prognosis and expectations for rehab  -all pt questions were answered    Home Exercise Prorgam:  Access Code: [email or web address]  URL: [email or web address]  Date: 07/09/2021 Condensed this date  Prepared by:  [facility]    Exercises  Sidelying Hip Abduction - 1 x daily - 7 x weekly - 3 sets - 10 reps  Anti-Rotation Sidestepping with Resistance - 1 x daily - 7 x weekly - 3 sets - 10 reps  Standing Row with Anchored Resistance - 1 x daily - 7 x weekly - 3 sets - 10 reps  Shoulder Extension with Resistance - 1 x daily - 7 x weekly - 3 sets - 10 reps  Hooklying Clamshell with Resistance - 1 x daily - 7 x weekly - 3 sets - 10 reps  Hooklying Isometric Clamshell - 1 x daily - 7 x weekly - 2 sets - 10 reps  Supine Bridge with Resistance Band - 1 x daily - 7 x weekly - 2 sets - 10 reps  Prone Hip Extension - 1 x daily - 7 x weekly - 2-3 sets - 10 reps  Supine Alternating Shoulder Flexion - 1 x daily - 7 x weekly - 3 sets - 10 reps        Therapeutic Exercise and NMR EXR  [x] (34925) Provided verbal/tactile cueing for activities related to strengthening, flexibility, endurance, ROM  for improvements in proximal hip and core control with self care, mobility, lifting and ambulation.  [] (85768) Provided verbal/tactile cueing for activities related to improving balance, coordination, kinesthetic sense, posture, motor skill, proprioception  to assist with core control in self care, mobility, lifting, and ambulation.   [] (49519) Therapist is in constant attendance of 2 or more patients providing skilled [] Home Management Training x  [] ES(attended) (26591)   [] Dry Needling 1-2 muscles (17337):  [] Dry Needling 3+ muscles (845502  [] Group:      [] Other:       GOALS:  Patient stated goal: Hip/leg strengthening  [x] Progressing: [] Met: [] Not Met: [] Adjusted    Therapist goals for Patient:   Short Term Goals: To be achieved in: 2 weeks  1. Independent in HEP and progression per patient tolerance, in order to prevent re-injury. [] Progressing: [x] Met: [] Not Met: [] Adjusted  2. Patient will have a decrease in pain to facilitate improvement in movement, function, and ADLs as indicated by Functional Deficits. [] Progressing: [x] Met: [] Not Met: [] Adjusted    Long Term Goals: To be achieved in: 8-10 weeks  1. Disability index score of 20% or less for the SHRUTI to assist with reaching prior level of function. [] Progressing: [x] Met: [] Not Met: [] Adjusted  2. Patient will demonstrate increased AROM to WNL, good LS mobility, good hip ROM to allow for proper joint functioning as indicated by patient's ability to perform squatting for IADLS with 09% decrease in pain,  [x] Progressing: [] Met: [] Not Met: [] Adjusted  3. Patient will demonstrate an increase in Strength to good proximal hip and core activation to allow for proper functional mobility as indicated by patient' sability to walk on uneven ground/grass without LOB to play with grandkids  [x] Progressing: [] Met: [] Not Met: [] Adjusted  4. Patient will return to functional activities including ascending/descending stairs reciprocally without rail X12  without increased symptoms or restriction. [x] Progressing: [] Met: [] Not Met: [] Adjusted  5. Patient to be able to  Participate in hiking on mild to moderate hills with  for short 20 minute hikes by discharge with minimal to no pain.     [x] Progressing: [] Met: [] Not Met: [] Adjusted     Overall Progression Towards Functional goals/ Treatment Progress Update:  [x] Patient is progressing as expected towards functional goals listed. [] Progression is slowed due to complexities/Impairments listed. [] Progression has been slowed due to co-morbidities. [] Plan just implemented, too soon to assess goals progression <30days   [] Goals require adjustment due to lack of progress  [] Patient is not progressing as expected and requires additional follow up with physician  [] Other    Persisting Functional Limitations/Impairments:  [x]Sitting [x]Standing   [x]Walking [x]Squatting/bending    [x]Stairs []ADL's    [x]Transfers [x]Reaching  []Housework []Job related tasks  []Driving []Sports/Recreation   []Sleeping []Other:    ASSESSMENT: Pt tolerated treatment well this date. Pt with no pain, pt only noted feeling \"wobbly\" today. Pt w/ good performance of all exercises this session. Focus of treatment was on improving hip strength and hip musculature activation. Modified planks were introduced this date w/ pt completing w/ good form. Sidelying planks were painful for pt neck/shoulders and were stopped. Overall, pt ability has improved and pt has been able to complete all household chores and ADLs/IADLs without difficulty. Patient may continue to benefit from skilled PT to address her impairments and functional limitations and restore her to her PLOF. Consider discharge in next 1-3 PT sessions. Treatment/Activity Tolerance:  [x] Patient able to complete tx  [] Patient limited by fatique  [] Patient limited by pain  [] Patient limited by other medical complications  [] Other:     Prognosis: [x] Good [] Fair  [] Poor    Patient Requires Follow-up: [x] Yes  [] No    PLAN: See eval. PT 2x / week for 8-10 weeks.    [x] Continue per plan of care [] Alter current plan (see comments)  [] Plan of care initiated [] Hold pending MD visit [] Discharge    Electronically signed by: Susan Leung, PT BOOGIE Hong Client, OLIM[place]IA  Therapist was present, directed the patient's care, made skilled judgement, and was responsible for assessment and treatment of the patient. Note: If patient does not return for scheduled/ recommended follow up visits, this note will serve as a discharge from care along with most recent update on progress.

## 2021-07-26 ENCOUNTER — HOSPITAL ENCOUNTER (OUTPATIENT)
Dept: PHYSICAL THERAPY | Age: 71
Setting detail: THERAPIES SERIES
Discharge: HOME | End: 2021-07-26
Payer: MEDICARE

## 2021-07-26 PROCEDURE — 97140 MANUAL THERAPY 1/> REGIONS: CPT

## 2021-07-26 PROCEDURE — 97110 THERAPEUTIC EXERCISES: CPT

## 2021-07-26 PROCEDURE — 97112 NEUROMUSCULAR REEDUCATION: CPT

## 2021-07-26 NOTE — PROGRESS NOTES
[address] [facility]  Phone: [phone]   Fax: [phone]  Physical Therapy Re-Certification Plan of Care    D  Physical Therapy Treatment Note:     Date:  2021    Patient Name:  Misael Bradford    :  1950  MRN: [id number]  Restrictions/Precautions:    Medical/Treatment Diagnosis Information:  · Diagnosis: L Hip Intertrochanteric Bursistis M70.62; M51.36 Lumbar DDD, Lumbar Foraminal Stenosis M48.061  · Treatment Diagnosis: Lumbosacral POstural Malalignments with decreased trunk/hip motion with Limited positions tolerance and pain with transitional movements especially with low levels  Insurance/Certification information:  PT Insurance Information: Medicare; [facility]  Physician Information:  Referring Practitioner: Dr. Keisha Joshi of care signed (Y/N): [x]  Yes []  No    Date of Patient follow up with Physician:      Progress Report: []  Yes  [x]  No     Date Range for reporting period:  Beginnin2021  PN: 2021  Ending:     Progress report due (10 Rx/or 30 days whichever is less):  /Recertification due (POC duration/ or 90 days whichever is less):  2021    /Visit # Insurance Allowable Auth required? Date Range   13/15- Medicare [address]  []  Yes  [x]  No      Latex Allergy:  [x]NO      []YES  Preferred Language for Healthcare:   [x]English       []other:    Functional Scale:       Date assessed:    Oswestry: raw score = 59; dysfunction = 26.25%  2021   Oswestry: raw score = 8; dysfunction = 16.00%  2021  Oswestry: raw score = 4; dysfunction = 8.00%  2021     Pain level; 0/10 Outer L thigh     Lumbar MRI results:   CONCLUSION:   1. At L3-4, minimal, grade 1 retrolisthesis. Left foraminal protrusion with thin annular    fissure and low-grade left foraminal stenosis.  Mild to moderate right and mild left facet    hypertrophy with ligamentum flavum thickening. 2. At L4-5, disc space narrowing.  Left foraminal protrusion with low-grade left foraminal    stenosis. Right parasagittal to extraforaminal mixed spondylotic protrusion, with adjacent    endplate hypertrophy.  Mild to moderate right foraminal stenosis with mild abutment of exiting    right L4 nerve root. Right lateral recess stenosis and mild abutment of transiting right L5    nerve root. Mild bilateral facet hypertrophy and ligamentum flavum thickening. Low-grade spinal    stenosis. 3. At L5-S1, left foraminal mixed spondylotic protrusion with adjacent endplate hypertrophy.     Low-grade left foraminal stenosis. Right foraminal mixed spondylotic displacement with adjacent    endplate hypertrophy.  Mild to moderate right foraminal stenosis with abutment of exiting    right L5 nerve root. LE MRI 4/14:  CONCLUSION:   1. Low-grade left hip chondromalacia. 2. Short, thin anterolateral left acetabular labral tear.  Separate posterior labral tear with    a large 5.5 x 1.3 x 1.4 cm paralabral cyst extending posterior to the left acetabulum and deep    to the left gluteus medius muscle. 3. Mild to moderate distal left gluteus minimus tendinosis with few small, thin/low-grade    partial-thickness and interstitial tears.  Low-grade distal left gluteus medius tendon strain,    with an associated 1.5 x 0.4 x 1 cm chronic avulsion fracture fragment near the left greater    trochanter. 4. Low-grade left hamstring origin tendinosis with mild left ischiofemoral impingement       SUBJECTIVE  7/26: pt w/ no pain today. Pt states she had dull achey pain in L hip yesterday. Pt with some tingling in LLE toes intermittently. OBJECTIVE: 7/26 L4 NRRSL/FRSR, L5ERSR.  LOL sacral torsion, R pubic upslip  Test measurements:     7/19    R  L  Hip ABD MMT  4/5 4/5  Hip ABD AROM 35*  38*  Hip flexors  4-/5 4-/5  6/28: L4 FRSL, L5 FRSL, LEAH sacral torsion  6/22: PN: Postural alignment:  R innominate SI, J5BWHHO/FRSL, L4ERSL, LOL SACRAL TORSION, L SI POSTERIOR INNOMINATE SI, R SI ANTERIOR INNOMINATE SI  LE STrength: L hip IR 4+/5, ER 4/5, Gluteus medius 4+/5; PIVM on L5 significantly limited    RESTRICTIONS/PRECAUTIONS: dizziness, cervical DDD, L hip labral tears, see mRIs above     Exercises/Interventions:     Therapeutic Exercise (28127) Resistance / level Sets / Seconds Reps Notes / Cues   Hookline B Hip ER lime 2 8 HEP   Hookline R and R Hip ER only; L iso lime 1 10 HEP   Hookline Bridges LIME 2 12 HEP    1 12 HEP    1 10 L/R HEP   Clam shellLime 2 10 R    L B squats  70#    R Single leg   L Single Leg  3      1  1 10      10  10 5/13   Standing HS and hip flex  Stretch at step  30\" 2 L/R     Nustep Level 1 5.5'  S=10, arms = 10, L4   Mat ex  Fig 4 piriformis and ITB stretches    3   30\"R/L    Recumbent Bike  5'     L       SL bridges  2 5 L/R 6/15 added   Sustained SL with Contralateral LAQ  2 10 6/15 added   \"    1  1   10  10 6/15 added   SLR in standing  Flexion  Abduction  extension    2  2  2   10 R/L  10 R/L  10 R/L 7/26^   TRX Squat   2 10    Prone press ups lumbar etxtension  1/10: 10 6/22HEP   IB   Gastoc   Soleus     2  2   30\" R/L  30\" R/L 6/24                        Therapeutic Activities (96265)       4/28/21 Pt was educated on PT POC, Diagnosis, Prognosis, pathomechanics as well as frequency and duration of scheduling future physical therapy appointments. Time was also taken on this day to answer all patient questions and participation in PT.                                       Neuromuscular Re-ed (68990)       Prone alternating leg lifts with multifidus activation  1 20 6/29    2 10 L 5/11   2 10  5/13   Hookline bridge with neutral pelvis 5/13   Total Gym2    1 10    10 L/R 5/18   Standing B GH ExtLime green 2 10 5/18   Blue2 10 5/18   1 10 5/18    1 10 5/20   4\" 2 10 5/25   4\" 2 10 5/25   Neutral pelvic tilt    Single marches /alt    Opp Le marches /Level 1 A       1        1       10 R/L        10 R/L 6/15   Contralateral arm/leg with foam    Ipsilateral arm/leg with foam   1        1   10 R/L        10 R/L Added 6/15   Standing Resisted Back ext with [place] 5\" 12    Psuedo Plank at table hip ext/multifidus  1 40 L/R 7/9   Modified front plank  20\" 3 7/19   Modified side plank  15\" 1 R/L 7/19   Rocker Board AP rocking    Maintain A/P center balance    M/L rocking    Maintain R/L center balance 1    20\"      1    20\" 10    1      10    1 7/26   Biodex Limits of stability Level 12 57% 7/26   Standing Balance Airex:   Stagger stance L/R    Head turns with stagger stance    Floor:  SLS static 30\"          1        10\" 1 L/R          30 L/R        2 R/L 5/25   Manual Intervention (92860)       Prone PA L1-L5 Grade 3    6/22   GISTM/STM  h  as well as STM X10' R sidelying    Lumbar Manip Gapping to R L4/5      SI Manip L unilateral sacral respiration mobs, L sacral Grade 4 mob, sacral nutation respiration mobs      Hip belt mobs       Hip LA distraction R LE     MET to correct     See manual section    x10' 6/8                   Dry needling manual therapy:   5/3 received permission to dry needle pt at gluteals and /or multifidi  from Dr. Johnson Lee as long as pt tolerates. 7/9: Dry needling manual therapy: consisted on the placement of 2 needles in the following muscles:  L piriformis and deep hip rotators. 75 mm needles were inserted, piston, rotated, and coned to produce intramuscular mobilization. These techniques were used to restore functional range of motion, reduce muscle spasm and induce healing in the corresponding musculature. (59176) Clean Technique was utilized today while applying Dry needling treatment. The treatment sites where cleaned with 70% solution of  isopropyl alcohol . The PT washed their hands and utilized treatment gloves along with hand  prior to inserting the needles. All needles where removed and discarded in the appropriate sharps container.        6/28: Dry needling manual therapy: consisted on the placement of 8 needles in the following muscles:  multifidi of L3,/L4/L5 L and R, L/R piriformis. 60  mm needle in multifidi and 75 mm needle in piriformis was inserted, piston, rotated, and coned to produce intramuscular mobilization. These techniques were used to restore functional range of motion, reduce muscle spasm and induce healing in the corresponding musculature. (88072)  Clean Technique was utilized today while applying Dry needling treatment. The treatment sites where cleaned with 70% solution of  isopropyl alcohol . The PT washed their hands and utilized treatment gloves along with hand  prior to inserting the needles. All needles where removed and discarded in the appropriate sharps container. Attended low frequency (1-20Hz) electrical stimulation was utilized in conjunction with Dry Needling:  the Estim was manipulated between all above needles for a period of 10 min. at  3-4 volts. The low frequency electrical stimulation was used to help reduce muscle spasm and help to interrupt /[place] the pain cycle. (15732)     6/24: Dry needling manual therapy: consisted on the placement of 9 needles in the following muscles: L quadratus lumborum, L/R lower lumbar mutlifidi, L/R Glut med, and L/R piriformis. 60 and 75 mm needles were inserted, piston, rotated, and coned to produce intramuscular mobilization. These techniques were used to restore functional range of motion, reduce muscle spasm and induce healing in the corresponding musculature. (90107)  Clean Technique was utilized today while applying Dry needling treatment. The treatment sites where cleaned with 70% solution of  isopropyl alcohol . The PT washed their hands and utilized treatment gloves along with hand  prior to inserting the needles. All needles where removed and discarded in the appropriate sharps container.   .   Attended low frequency (1-20Hz) electrical stimulation was utilized in conjunction with Dry Needling:  the Estim was manipulated between all above needles for a period of 10 min. at  3-4 volts. The low frequency electrical stimulation was used to help reduce muscle spasm and help to interrupt /[place] the pain cycle. (46292)       Modalities:   Manual:   7/26: MET to correct L4 NRRSL/FRSR, L5ERSR. LOL sacral torsion, R pubic upslip, R SI posterior innominate SI, L SI anterior innominate SI    7/9: MET to correct L5FRSR, ROL sacral torsion, R SI posterior innominate SI  6/28: MET to correct L4 FRSL, L5 FRSL, LEAH sacral torsion, L SI posterio rinnominate SI  6/24: PAs to L TPs of L4/L5; L4/L5 L sidelying Manipulations, PAS L1-L5 GRade III  6/22: After R LA to correct R innominate upslip,  MET to correct K5UUDID/FRSL, L4ERSL, LOL SACRAL TORSION, L SI POSTERIOR INNOMINATE SI, R SI ANTERIOR INNOMINATE SI  6/15: MET to correct L5 ERSL  6/14: Left Sidelying gapping technique to ERSL at L5,  5/20: After R Leg pull to correct R innominate upslip, MET to correct L5FRSR, LOL sacral torsionR SI posterior innominate SI, L SI anterior innominate SI  5/18: MET to correct L5 FRSR, ROR sacral torsion, R SI posterior innominate SI, L SI anterior innominate SI  5/13: MET to correct L4 FRSR, L5 FRSL, LEAH sacral torsion    Pt. Education:  -pt educated on diagnosis, prognosis and expectations for rehab  -all pt questions were answered    Home Exercise Prorgam:  Access Code: [email or web address]  URL: [email or web address]  Date: 07/09/2021 Condensed this date  Prepared by:  [facility]    Exercises  Sidelying Hip Abduction - 1 x daily - 7 x weekly - 3 sets - 10 reps  Anti-Rotation Sidestepping with Resistance - 1 x daily - 7 x weekly - 3 sets - 10 reps  Standing Row with Anchored Resistance - 1 x daily - 7 x weekly - 3 sets - 10 reps  Shoulder Extension with Resistance - 1 x daily - 7 x weekly - 3 sets - 10 reps  Hooklying Clamshell with Resistance - 1 x daily - 7 x weekly - 3 sets - 10 reps  Hooklying Isometric Clamshell - 1 x daily - 7 x weekly - 2 sets - 10 reps  Supine Bridge with Resistance Band - 1 x daily - 7 x weekly - 2 sets - 10 reps  Prone Hip Extension - 1 x daily - 7 x weekly - 2-3 sets - 10 reps  Supine Alternating Shoulder Flexion - 1 x daily - 7 x weekly - 3 sets - 10 reps        Therapeutic Exercise and NMR EXR  [x] (95202) Provided verbal/tactile cueing for activities related to strengthening, flexibility, endurance, ROM  for improvements in proximal hip and core control with self care, mobility, lifting and ambulation.  [] (67089) Provided verbal/tactile cueing for activities related to improving balance, coordination, kinesthetic sense, posture, motor skill, proprioception  to assist with core control in self care, mobility, lifting, and ambulation.   [] (21371) Therapist is in constant attendance of 2 or more patients providing skilled therapy interventions, but not providing any significant amount of measurable one-on-one time to either patient, for improvements in LE, proximal hip, and core control in self care, mobility, lifting, ambulation and eccentric single leg control.      Therapeutic Activities:    [x] (70824 or 65299) Provided verbal/tactile cueing for activities related to improving balance, coordination, kinesthetic sense, posture, motor skill, proprioception and motor activation to allow for proper function  with self care and ADLs  [] (68570) Provided training and instruction to the patient for proper core and proximal hip recruitment and positioning with ambulation re-education     Home Exercise Program:    [x] (70671) Reviewed/Progressed HEP activities related to strengthening, flexibility, endurance, ROM of core, proximal hip and LE for functional self-care, mobility, lifting and ambulation   [] (77923) Reviewed/Progressed HEP activities related to improving balance, coordination, kinesthetic sense, posture, motor skill, proprioception of core, proximal hip and LE for self care, mobility, lifting, and ambulation      Manual Met: [] Adjusted  3. Patient will demonstrate an increase in Strength to good proximal hip and core activation to allow for proper functional mobility as indicated by patient' sability to walk on uneven ground/grass without LOB to play with grandkids  [x] Progressing: [] Met: [] Not Met: [] Adjusted  4. Patient will return to functional activities including ascending/descending stairs reciprocally without rail X12  without increased symptoms or restriction. [x] Progressing: [] Met: [] Not Met: [] Adjusted  5. Patient to be able to  Participate in hiking on mild to moderate hills with  for short 20 minute hikes by discharge with minimal to no pain. [x] Progressing: [] Met: [] Not Met: [] Adjusted     Overall Progression Towards Functional goals/ Treatment Progress Update:  [x] Patient is progressing as expected towards functional goals listed. [] Progression is slowed due to complexities/Impairments listed. [] Progression has been slowed due to co-morbidities. [] Plan just implemented, too soon to assess goals progression <30days   [] Goals require adjustment due to lack of progress  [] Patient is not progressing as expected and requires additional follow up with physician  [] Other    Persisting Functional Limitations/Impairments:  [x]Sitting [x]Standing   [x]Walking [x]Squatting/bending    [x]Stairs []ADL's    [x]Transfers [x]Reaching  []Housework []Job related tasks  []Driving []Sports/Recreation   []Sleeping []Other:    ASSESSMENT: Pt tolerated treatment well this date. Pt with no pain today. Pt demo'd better performance with SL balance this date, less LOB occurred bilaterally. Pt w/ good performance of all exercises this session. Focus of treatment was on improving hip strength and hip musculature activation. Palpation revealed R pubic upslip LOL sacral torsion and lumbar postural malalignments which were corrected with MET and followed with core/hip stability exercises.  Patient may continue to benefit from skilled PT to address her impairments and functional limitations and restore her to her PLOF. Consider discharge in next 1-2 PT sessions. Treatment/Activity Tolerance:  [x] Patient able to complete tx  [] Patient limited by fatique  [] Patient limited by pain  [] Patient limited by other medical complications  [] Other:     Prognosis: [x] Good [] Fair  [] Poor    Patient Requires Follow-up: [x] Yes  [] No    PLAN: See eval. PT 2x / week for 8-10 weeks. [x] Continue per plan of care [] Alter current plan (see comments)  [] Plan of care initiated [] Hold pending MD visit [] Discharge    Electronically signed by: Endy Stone PT, MSPT  Gillian Raphael SPT  Therapist was present, directed the patient's care, made skilled judgement, and was responsible for assessment and treatment of the patient. Note: If patient does not return for scheduled/ recommended follow up visits, this note will serve as a discharge from care along with most recent update on progress.

## 2021-08-03 ENCOUNTER — HOSPITAL ENCOUNTER (OUTPATIENT)
Dept: PHYSICAL THERAPY | Age: 71
Setting detail: THERAPIES SERIES
Discharge: HOME | End: 2021-08-03
Payer: MEDICARE

## 2021-08-03 PROCEDURE — 20561 NDL INSJ W/O NJX 3+ MUSC: CPT

## 2021-08-03 PROCEDURE — 97140 MANUAL THERAPY 1/> REGIONS: CPT

## 2021-08-18 ENCOUNTER — OFFICE VISIT (OUTPATIENT)
Dept: ORTHOPEDIC SURGERY | Age: 71
End: 2021-08-18
Payer: MEDICARE

## 2021-08-18 VITALS — BODY MASS INDEX: 25.83 KG/M2 | RESPIRATION RATE: 12 BRPM | WEIGHT: 155 LBS | HEIGHT: 65 IN

## 2021-08-18 DIAGNOSIS — M76.892 HIP ABDUCTOR TENDONITIS, LEFT: Primary | ICD-10-CM

## 2021-08-18 DIAGNOSIS — M24.152 DEGENERATIVE TEAR OF ACETABULAR LABRUM OF LEFT HIP: ICD-10-CM

## 2021-08-18 PROCEDURE — 4040F PNEUMOC VAC/ADMIN/RCVD: CPT | Performed by: ORTHOPAEDIC SURGERY

## 2021-08-18 PROCEDURE — G8427 DOCREV CUR MEDS BY ELIG CLIN: HCPCS | Performed by: ORTHOPAEDIC SURGERY

## 2021-08-18 PROCEDURE — 1123F ACP DISCUSS/DSCN MKR DOCD: CPT | Performed by: ORTHOPAEDIC SURGERY

## 2021-08-18 PROCEDURE — 3017F COLORECTAL CA SCREEN DOC REV: CPT | Performed by: ORTHOPAEDIC SURGERY

## 2021-08-18 PROCEDURE — 1036F TOBACCO NON-USER: CPT | Performed by: ORTHOPAEDIC SURGERY

## 2021-08-18 PROCEDURE — G8399 PT W/DXA RESULTS DOCUMENT: HCPCS | Performed by: ORTHOPAEDIC SURGERY

## 2021-08-18 PROCEDURE — G8417 CALC BMI ABV UP PARAM F/U: HCPCS | Performed by: ORTHOPAEDIC SURGERY

## 2021-08-18 PROCEDURE — 1090F PRES/ABSN URINE INCON ASSESS: CPT | Performed by: ORTHOPAEDIC SURGERY

## 2021-08-18 PROCEDURE — 99213 OFFICE O/P EST LOW 20 MIN: CPT | Performed by: ORTHOPAEDIC SURGERY

## 2021-08-18 RX ORDER — PREGABALIN 75 MG/1
75 CAPSULE ORAL 2 TIMES DAILY
COMMUNITY
Start: 2021-08-17 | End: 2022-02-13

## 2022-06-16 ENCOUNTER — OFFICE VISIT (OUTPATIENT)
Dept: ORTHOPEDIC SURGERY | Age: 72
End: 2022-06-16
Payer: MEDICARE

## 2022-06-16 VITALS — BODY MASS INDEX: 25.83 KG/M2 | HEIGHT: 65 IN | WEIGHT: 155 LBS

## 2022-06-16 DIAGNOSIS — M24.152 DEGENERATIVE TEAR OF ACETABULAR LABRUM OF LEFT HIP: ICD-10-CM

## 2022-06-16 DIAGNOSIS — M76.892 HIP ABDUCTOR TENDONITIS, LEFT: Primary | ICD-10-CM

## 2022-06-16 PROCEDURE — 1123F ACP DISCUSS/DSCN MKR DOCD: CPT | Performed by: ORTHOPAEDIC SURGERY

## 2022-06-16 PROCEDURE — 1036F TOBACCO NON-USER: CPT | Performed by: ORTHOPAEDIC SURGERY

## 2022-06-16 PROCEDURE — 99214 OFFICE O/P EST MOD 30 MIN: CPT | Performed by: ORTHOPAEDIC SURGERY

## 2022-06-16 PROCEDURE — 1090F PRES/ABSN URINE INCON ASSESS: CPT | Performed by: ORTHOPAEDIC SURGERY

## 2022-06-16 PROCEDURE — G8427 DOCREV CUR MEDS BY ELIG CLIN: HCPCS | Performed by: ORTHOPAEDIC SURGERY

## 2022-06-16 PROCEDURE — G8417 CALC BMI ABV UP PARAM F/U: HCPCS | Performed by: ORTHOPAEDIC SURGERY

## 2022-06-16 PROCEDURE — G8399 PT W/DXA RESULTS DOCUMENT: HCPCS | Performed by: ORTHOPAEDIC SURGERY

## 2022-06-16 PROCEDURE — 3017F COLORECTAL CA SCREEN DOC REV: CPT | Performed by: ORTHOPAEDIC SURGERY

## 2022-06-16 NOTE — PROGRESS NOTES
Chief Complaint  Hip Pain (F/U L HIP )      History of Present Illness:  Eric Bone is a pleasant 80 y.o. female who presents today for follow up of her left hip pain. This is a long standing issue for her, noting her pain dates back to 2018. She was last seen 10 months ago for this issue. Pain assessment as described below. She was previously given a left greater trochanteric bursa injection by me on April 14, 2021. The patient continues to report intermittent sharp pains, but constant achy pains. She is now having new onset groin pain, that worsens with turning motions. Prior MRIs had shown a degenerative labral tear, but with no symptoms previously. Medical History:  Patient's medications, allergies, past medical, surgical, social and family histories were reviewed and updated as appropriate. Pain Assessment  Location of Pain:  (HIP)  Location Modifiers: Left  Severity of Pain: 1  Quality of Pain: Sharp,Aching  Frequency of Pain: Constant (INTERMITTENT SHARP)  Aggravating Factors:  (TURN. LAY AT NIGHT)  Limiting Behavior: Yes  Result of Injury: No  Are there other pain locations you wish to document?: No  ROS: Review of systems reviewed from Patient History Form completed today and available in the patient's chart under the Media tab. Pertinent items are noted in HPI  Review of systems reviewed from Patient History Form completed today and available in the patient's chart under the Media tab. Vital Signs:  Ht 5' 4.5\" (1.638 m)   Wt 155 lb (70.3 kg)   BMI 26.19 kg/m²         Neuro: Alert & oriented x 3,  normal,  no focal deficits noted. Normal affect. Eyes: sclera clear  Ears: Normal external ear  Mouth:  No perioral lesions  Pulm: Respirations unlabored and regular  Pulse: Extremities well perfused. 2+ peripheral pulses. Skin: Warm. No ulcerations. Constitutional: The physical examination finds the patient to be well-developed and well-nourished.   The patient is alert and oriented x3 and was cooperative throughout the visit. Hip Examination: left    Skin/Inspection: No skin lesions, cellulitis, or extreme edema in the lower extremities. Standing/Walking: normal no-antalgic gait, negative Trendelenburg sign. Supine Exam: Non tender around the ASIS, AIIS  Flexion arc 0 to 100deg, IR 25 deg, ER 45 deg full range of motion  Special Tests:  +Deep Flexion Test, +FADIR ,  negative  No pain with ELHAM    Side Lying Exam: not tender at greater trochanter, not tender abductor musculature,   Abductor side leg raise 5/5, normal. OberTest    Distal Neurovascular exam is intact (foot sensation, pulses, and motor exam)      Diagnostics:  No new imaging was obtained today        Assessment: Patient is a 80 y.o. female with an MRI diagnosis of greater trochanteric bursitis and abductor tendinopathy. She also has a degenerative labral tear that I suspect is now causing her symptoms due to new onset groin pain. 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 No orders of the defined types were placed in this encounter. Plan:  Pertinent imaging was reviewed. The etiology, natural history, and treatment options for the disorder were discussed. The roles of activity modification, antiinflammatory medicine, injections, bracing, physical therapy, and surgical interventions were all described to the patient and questions were answered. We believe patient is a candidate for a diagnostic/therapeutic intra-articular cortisone injection of the left hip. We prefer to administer this under ultrasound guidance. We have booked the patient at the next available injection office and we will see her back at the next visit to journal response to the injection.       All the patient's questions were answered while in the clinic. The patient is understanding of all instructions and agrees with the plan. Approximately 30 minutes was spent on patient education and coordinating care. Follow up in: Return in about 27 days (around 7/13/2022). IMonie, am scribing for Dr. Eagle Trevino MD.  06/16/22 3:52 PM Monie Arellano. The physical examination was performed between the patient and Dr. Eagle Trevino MD.  All counseling during the appointment was performed between the patient and the provider. Sincerely,    Eagle Trevino MD [address] [facility] [address] [place] [address], [phone]  Email: [email or web address]  Office: [phone]    06/16/22  3:52 PM        The encounter with [facility] Client was carried out by myself, Dr Britney Phillips, who personally examined the patient and reviewed the plan. This dictation was performed with a verbal recognition program (DRAGON) and it was checked for errors. It is possible that there are still dictated errors within this office note. If so, please bring any errors to my attention for an addendum. All efforts were made to ensure that this office note is accurate.

## 2022-07-09 ENCOUNTER — OFFICE VISIT (OUTPATIENT)
Dept: ORTHOPEDIC SURGERY | Age: 72
End: 2022-07-09
Payer: MEDICARE

## 2022-07-09 VITALS — HEIGHT: 65 IN | BODY MASS INDEX: 26.33 KG/M2 | WEIGHT: 158 LBS | RESPIRATION RATE: 12 BRPM

## 2022-07-09 DIAGNOSIS — M25.561 RIGHT KNEE PAIN, UNSPECIFIED CHRONICITY: Primary | ICD-10-CM

## 2022-07-09 PROCEDURE — 1123F ACP DISCUSS/DSCN MKR DOCD: CPT | Performed by: PHYSICIAN ASSISTANT

## 2022-07-09 PROCEDURE — G8427 DOCREV CUR MEDS BY ELIG CLIN: HCPCS | Performed by: PHYSICIAN ASSISTANT

## 2022-07-09 PROCEDURE — G8399 PT W/DXA RESULTS DOCUMENT: HCPCS | Performed by: PHYSICIAN ASSISTANT

## 2022-07-09 PROCEDURE — 99213 OFFICE O/P EST LOW 20 MIN: CPT | Performed by: PHYSICIAN ASSISTANT

## 2022-07-09 PROCEDURE — 3017F COLORECTAL CA SCREEN DOC REV: CPT | Performed by: PHYSICIAN ASSISTANT

## 2022-07-09 PROCEDURE — 1090F PRES/ABSN URINE INCON ASSESS: CPT | Performed by: PHYSICIAN ASSISTANT

## 2022-07-09 PROCEDURE — G8417 CALC BMI ABV UP PARAM F/U: HCPCS | Performed by: PHYSICIAN ASSISTANT

## 2022-07-09 PROCEDURE — 1036F TOBACCO NON-USER: CPT | Performed by: PHYSICIAN ASSISTANT

## 2022-07-09 RX ORDER — B-COMPLEX WITH VITAMIN C
1 TABLET ORAL DAILY
COMMUNITY

## 2022-07-09 RX ORDER — MONTELUKAST SODIUM 10 MG/1
10 TABLET ORAL DAILY
COMMUNITY

## 2022-07-09 RX ORDER — PREGABALIN 75 MG/1
CAPSULE ORAL
COMMUNITY
Start: 2022-03-10 | End: 2022-09-06

## 2022-07-09 RX ORDER — ERYTHROMYCIN 5 MG/G
OINTMENT OPHTHALMIC
COMMUNITY
Start: 2022-07-06

## 2022-07-09 RX ORDER — TRAZODONE HYDROCHLORIDE 50 MG/1
50 TABLET ORAL NIGHTLY
COMMUNITY
Start: 2022-05-11

## 2022-07-09 RX ORDER — METOPROLOL SUCCINATE 25 MG/1
25 TABLET, EXTENDED RELEASE ORAL DAILY
COMMUNITY
Start: 2022-01-11

## 2022-07-09 RX ORDER — METHOCARBAMOL 500 MG/1
750 TABLET, FILM COATED ORAL 3 TIMES DAILY
COMMUNITY

## 2022-07-09 NOTE — PROGRESS NOTES
status: Former Smoker     Packs/day: 1.00     Years: 20.00     Pack years: 20.00     Quit date: 2005     Years since quittin.5    Smokeless tobacco: Never Used   Substance and Sexual Activity    Alcohol use: Yes     Comment: 2 per week    Drug use: No    Sexual activity: Not on file       Current Outpatient Medications   Medication Sig Dispense Refill    apixaban (ELIQUIS) 5 MG TABS tablet Take 5 mg by mouth 2 times daily      metoprolol succinate (TOPROL XL) 25 MG extended release tablet Take 25 mg by mouth daily      pregabalin (LYRICA) 75 MG capsule TAKE ONE CAPSULE BY MOUTH TWICE A DAY      traZODone (DESYREL) 50 MG tablet Take 50 mg by mouth nightly      Calcium Carbonate-Vitamin D (OYSTER SHELL CALCIUM/D) 500-200 MG-UNIT TABS Take 1 tablet by mouth daily      erythromycin (ROMYCIN) 5 MG/GM ophthalmic ointment APPLY A SMALL AMOUNT TO INCISIONS AND IN SURGICAL EYES THREE TIMES DAILY FOR ONE WEEK      methocarbamol (ROBAXIN) 500 MG tablet Take 750 mg by mouth 3 times daily      montelukast (SINGULAIR) 10 MG tablet Take 10 mg by mouth daily      amLODIPine (NORVASC) 2.5 MG tablet Take 2.5 mg by mouth daily      fluticasone (FLONASE) 50 MCG/ACT nasal spray FLONASE ALLERGY RELIEF SUSP      Loratadine (CLARITIN PO) Take by mouth      Zolpidem Tartrate (AMBIEN PO) Take 10 mg by mouth daily as needed        No current facility-administered medications for this visit. Allergies   Allergen Reactions    Codeine Nausea Only and Nausea And Vomiting    Tramadol Nausea And Vomiting and Nausea Only    Sulfa Antibiotics Hives         Objective:     She is alert, oriented x 3, pleasant, well nourished, developed and in no acute distress. Resp 12   Ht 5' 4.5\" (1.638 m)   Wt 158 lb (71.7 kg)   BMI 26.70 kg/m²      Examination of the right knee: Inspection of the skin negative for discoloration, wounds, gross deformity or erythema.   Inspection of the soft tissues negative for swelling, muscle atrophy or asymmetry. The overall alignment of the knee is neutral.  Gait is normal.  There is no intra-articular effusion. AROM  Extension 0  Flexion  135   There no pain associated with ROM testing. Medial joint line non-tender to palpation. Lateral joint line non-tender to palpation. Pes anserine bursa non-tender to palpation. Patellar tendon non-tender to palpation. Quadriceps tendon non-tender to palpation. Collateral ligaments non-tender to palpation. Popliteal fossa non-tender to palpation. Retro patellar crepitus is not present. Varus Stress testing negative for pain or crepitus. Valgus Stress testing negative for pain or crepitus. Lachman's test negative for  ACL laxity. Anterior Drawer test negative for ACL laxity. Posterior Drawer test negative for PCL laxity. Arvind's Test negative for meniscus tear. Patellar Compression testing negative for pain or crepitus. Extensor Mechanism is intact. Lower extremities:  She has 5/5 motor strength of bilateral lower extremities. She has a negative straight leg raise, bilaterally. Deep tendon reflexes at knees and achilles are 2+. Sensation is intact to light touch L3 to S1 bilaterally. She has no clonus. Examination of the lower extremities shows intact perfusion to both lower extremities. She has no cyanosis and digigts are warm to touch, capillary refill is less than 2 seconds. She has no edema noted. She has intact skin without lacerations or abrasions, no significant erythema, rashes or skin lesions. X Rays: were performed in the office today:   AP Standing,Lateral and Sunrise of right Knee:   Minimal degenerative changes patellofemoral compartment. The alignment is anatomic. There are no radiographic findings to suggest loose body, fracture or dislocation. Additional Tests reviewed: none  Additional Outside Records reviewed: none    Diagnosis:       ICD-10-CM    1.  Right knee pain, unspecified chronicity  M25.561 XR KNEE RIGHT (3 VIEWS)        Assessment and Plan:       Assessment:  Right knee pain which has since resolved. She may had some of the synovial joint lining stuck within the mechanism of the knee. Symptoms have since resolved spontaneously. Negative physical exam including negative Arvind's test.      I had an extensive discussion with Ms. Bceky Bone regarding the natural history, etiology, and long term consequences of her condition. I have presented reasonable alternatives to the patient's proposed care, treatment, and services. Risks and benefits of the treatment options also reviewed in detail. I have outlined a treatment plan with them. She has had full opportunity to ask her questions. I have answered them all to her satisfaction. I feel that Ms. Becky Bone understands our discussion today. Plan:  Medications-   Rest, Ice, Compression and Elevation  Activity restriction/ Modification discussed. OTC NSAIDS discussed. 1. The most common side effects from NSAIDs are stomachaches, heartburn, and nausea. NSAIDs may irritate the stomach lining. If the medicine upsets your stomach, you can try taking it with food. But if that doesn't help, talk with your doctor to make sure it's not a more serious problem, such as a stomach ulcer or bleeding in the stomach or intestines. 2. Using NSAIDs may:  ? Lead to high blood pressure. ? Make symptoms of heart failure worse. ? Raise the risk of heart attack, stroke, kidney damage, and skin reactions. 3. Your risks are greater if you take NSAIDs at higher doses or for longer than the label says. People who are older than 72 or who have heart, stomach, or intestinal disease have a higher risk for problems. Follow up:   Call or return to clinic if these symptoms worsen or fail to improve as anticipated.                                                   Kenan Salinas PA-C   [facility]                                         Disclaimer: This note was generated with use of a verbal recognition program (DRAGON) and an attempt was made to check for errors. It is possible that there are still dictated errors within this office note. If so, please bring any significant errors to my attention for an addendum. All efforts were made to ensure that this office note is accurate.

## 2022-07-11 ENCOUNTER — TELEPHONE (OUTPATIENT)
Dept: ORTHOPEDIC SURGERY | Age: 72
End: 2022-07-11

## 2022-07-11 NOTE — TELEPHONE ENCOUNTER
General Question     Subject: PATIENT HAS QUESTIONS REGARDING HER KNEE. PATIENT IS WANTING TO FOLLOW UP WITH DR. Albert Martinez FOR A NEW ISSUE.  PLEASE ADVISE  Patient Kathleen Sewell \"Paige\"  Contact Number: [phone]

## 2022-07-13 ENCOUNTER — OFFICE VISIT (OUTPATIENT)
Dept: ORTHOPEDIC SURGERY | Age: 72
End: 2022-07-13

## 2022-07-13 VITALS — BODY MASS INDEX: 26.33 KG/M2 | WEIGHT: 158 LBS | HEIGHT: 65 IN

## 2022-07-13 DIAGNOSIS — S83.8X1A SPRAIN OF MEDIAL MENISCUS OF RIGHT KNEE, INITIAL ENCOUNTER: ICD-10-CM

## 2022-07-13 DIAGNOSIS — M24.152 DEGENERATIVE TEAR OF ACETABULAR LABRUM OF LEFT HIP: Primary | ICD-10-CM

## 2022-07-13 DIAGNOSIS — M76.892 HIP ABDUCTOR TENDONITIS, LEFT: ICD-10-CM

## 2022-07-13 PROCEDURE — 20610 DRAIN/INJ JOINT/BURSA W/O US: CPT | Performed by: ORTHOPAEDIC SURGERY

## 2022-07-13 PROCEDURE — 20611 DRAIN/INJ JOINT/BURSA W/US: CPT | Performed by: ORTHOPAEDIC SURGERY

## 2022-07-13 RX ORDER — ROPIVACAINE HYDROCHLORIDE 5 MG/ML
30 INJECTION, SOLUTION EPIDURAL; INFILTRATION; PERINEURAL ONCE
Status: COMPLETED | OUTPATIENT
Start: 2022-07-13 | End: 2022-07-13

## 2022-07-13 RX ORDER — METHYLPREDNISOLONE ACETATE 40 MG/ML
40 INJECTION, SUSPENSION INTRA-ARTICULAR; INTRALESIONAL; INTRAMUSCULAR; SOFT TISSUE ONCE
Status: COMPLETED | OUTPATIENT
Start: 2022-07-13 | End: 2022-07-13

## 2022-07-13 RX ADMIN — ROPIVACAINE HYDROCHLORIDE 30 ML: 5 INJECTION, SOLUTION EPIDURAL; INFILTRATION; PERINEURAL at 13:40

## 2022-07-13 RX ADMIN — METHYLPREDNISOLONE ACETATE 40 MG: 40 INJECTION, SUSPENSION INTRA-ARTICULAR; INTRALESIONAL; INTRAMUSCULAR; SOFT TISSUE at 13:39

## 2022-07-13 RX ADMIN — METHYLPREDNISOLONE ACETATE 40 MG: 40 INJECTION, SUSPENSION INTRA-ARTICULAR; INTRALESIONAL; INTRAMUSCULAR; SOFT TISSUE at 13:38

## 2022-07-13 NOTE — PROGRESS NOTES
7/13/22  12:56 PM        NDC: 75296-400-20   -   Ropivacaine 0.5 %    LOT: [id number]    COMMENT: LEFT HIP INTRA-ARTICULAR CORTISONE INJECTION + RIGHT KNEE CORTISONE INJECTION       NDC: [address]-9     -    Depo Medrol 40,g    LOT: [id number]    COMMENT: LEFT HIP INTRA-ARTICULAR CORTISONE INJECTION + RIGHT KNEE CORTISONE INJECTION

## 2022-07-13 NOTE — PROGRESS NOTES
Chief Complaint  Hip Pain (L HIP INJECTION)      History of Present Illness:  [facility] 80 y.[facility]. female presents today for follow up of her left hip pain. She is here for a prescheduled left hip intra-articular cortisone injection. Pain assessment as described below and reviewed today with the patient. Her hip pain has been ongoing for the past 4 years. She received a greater trochanteric bursa injection 3 months ago. Pain in her groin continues. She also presents today with new onset right knee pain. She was seen by after hours on 7/9/22. She denies any specific injuries but states she was in her kitchen when she turned quickly and had instant onset knee pain. She reports her pain to be 7/10 and sharp in nature, reporting her symptoms to be primarily along the medial side. She reports her symptoms worsen with walking and \"gets stuck\". Unable to fully extend at times. No prior knee pain. She notes she has grandchild coming to town to visit soon and is hoping to get an injection into her right knee today as well. Cannot tolerate NSAIDs, history of afib. Medical History:  Patient's medications, allergies, past medical, surgical, social and family histories were reviewed and updated as appropriate. Pain Assessment  Location of Pain: Hip  Location Modifiers: Left  Severity of Pain: 4  Quality of Pain: Sharp  Frequency of Pain: Intermittent  Aggravating Factors: Standing  Limiting Behavior: Yes  Relieving Factors: Rest,Ice  Result of Injury: No  Work-Related Injury: No  Are there other pain locations you wish to document?: No     ROS: Review of systems reviewed from Patient History Form completed today and available in the patient's chart under the Media tab. Pertinent items are noted in HPI  Review of systems reviewed from Patient History Form completed today and available in the patient's chart under the Media tab.        Vital Signs:  Ht 5' 4.5\" (1.638 m)   Wt 158 lb (71.7 kg)   BMI 26.70 kg/m²         Neuro: Alert & oriented x 3,  normal,  no focal deficits noted. Normal affect. Eyes: sclera clear  Ears: Normal external ear  Mouth:  No perioral lesions  Pulm: Respirations unlabored and regular  Pulse: Extremities well perfused. 2+ peripheral pulses. Skin: Warm. No ulcerations. Constitutional: The physical examination finds the patient to be well-developed and well-nourished. The patient is alert and oriented x3 and was cooperative throughout the visit. Hip Examination: Left    Skin/Inspection: No skin lesions, cellulitis, or extreme edema in the lower extremities. Standing/Walking: normal no-antalgic gait, negative Trendelenburg sign. Supine Exam: Non tender around the ASIS, AIIS  Flexion arc 0 to 100deg, IR 25 deg, ER 45 deg full range of motion  Special Tests: positive Deep Flexion Test,     positive FADIR     negative pain with ELHAM  Resisted Abduction 5/5   Resisted Adduction 5/5     Side Lying Exam: not tender at greater trochanter, not tender abductor musculature,   Abductor side leg raise 5/5, normal. OberTest    Distal Neurovascular exam is intact (foot sensation, pulses, and motor exam)      Right Knee Examination:    Inspection: Normal muscle contours and no significant limb length discrepancy. No gross atrophy in any particular myotome. There is no obvious swelling or joint effusion of the knee. There are no abrasions, lacerations, contusions, hematomas or ecchymosis. There is no erythema, induration or warmth to suggest an infectious process. Palpation:  There is No patellofemoral crepitus    Range of Motion:    Flexion: 140°   Extension: 0°    Strength:  Quad 4 / 5  ; Hamstrings 4 / 5.   Gross motor to hip and ankle intact    Special Tests:    Lachman (ACL) - negative   Posterior Lachman (PCL) - negative    Anterior Drawer (ACL) - negative   Posterior Drawer (PCL) - negative   Pivot shift (ACL) - negative    Posterior sag (PCL) - negative   Arvind's Test (Meniscus) - negative   Homans Test (Thrombophlebitis)- negative   Does not open to valgus or varus stress at 0 or 30° (MCL/LCL)    Skin: There are no rashes, ulcerations or lesions. Sensation to light touch intact. Circulation: The limb is warm and well perfused. Distal pulses intact. Capillary refill is intact. Edema: none. Venous stasis changes: none. Gait: Abnormal      Diagnostics:  No new imaging of left hip were obtained today      X-rays from 7/9/22 were reviewed in office:  Views: 3 view right knee with comparison AP, flexion PA and skyline views of the right knee  Impression: No evidence for acute fracture or subluxation / dislocation. There are no loose bodies appreciated. There is no evidence of tibiofemoral subluxation. Assessment: Patient is a 80 y.o. female with a left hip diagnosis of abductor tendinopathy and a degenerative labral tear. She is still a  candidate for a left hip intra-articular cortisone injection and there are no contraindications with proceeding with injection as planned. In regards to her new right knee symptoms, I suspect she is suffering from a medial mensicus strain. She is also a good candidate for a right knee intra-articular cortisone injection today. 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 8X1A          Office Procedures:  Orders Placed This Encounter   Medications    methylPREDNISolone acetate (DEPO-MEDROL) injection 40 mg    ropivacaine (NAROPIN) 0.5% injection 30 mL    methylPREDNISolone acetate (DEPO-MEDROL) injection 40 mg    ropivacaine (NAROPIN) 0.5% injection 30 mL     Orders Placed This Encounter   Procedures    US GUIDED NEEDLE PLACEMENT     Standing Status:   Future     Number of Occurrences:   1     Standing Expiration Date:   7/13/2023    IA ARTHROCENTESIS ASPIR&/INJ MAJOR JT/BURSA W/O US    IA ARTHROCENTESIS ASPIR&/INJ MAJOR JT/BURSA W/O US       Plan:  Pertinent imaging was reviewed. The etiology, natural history, and treatment options for the disorder were discussed. The roles of activity modification, antiinflammatory medicine, injections, bracing, physical therapy, and surgical interventions were all described to the patient and questions were answered. We believe patient is a candidate for prescheduled left hip intra-articular cortisone injection and right knee cortisone injection. We will proceed with both injections today as planned. The patient was given the option of performing today's injection using ultrasound guidance. We discussed the pros and cons of using the ultrasound for guidance. The patient chose to proceed, and today's injection was performed using Logic E ultrasound unit with a variable frequency (10 MHz) linear transducer for localization and needle placement. The image was saved for the medical record. Procedure: The indications and risks of steroid injection as well as treatment alternatives were discussed with the patient who consented to the procedure. Under sterile conditions and after informed consent was obtained the patient was given an injection into the left hip joint with ultrasound guidance. Using a 20 gauge needle, 2cc  of 40 mg/mL of Depo-Medrol and 4 mL of 0.5% ropivacaine (Naropin) were placed in the hip after it was prepped with chlorhexidine and needle localization was confirmed on ultrasound. This resulted in good relief of symptoms, and flexion/rotation tests of the involved hip shortly after the injection were somewhat less provocative. There were no complications. The patient was advised to ice the hip  this evening and to avoid vigorous activities for the next 2 days.   They were advised to call us if there was any erythema, enduration, swelling or increasing pain.    Procedure: The indications and risks of steroid injection as well as treatment alternatives were discussed with the patient who consented to the procedure. Under sterile conditions and after informed consent was obtained the patient was given an injection into the right knee. 2cc 40 mg of Depo-Medroland 4 mL of 0.5% ropivacaine (Naropin) were placed in the knee after it was prepped with chlorhexidine. This resulted in good relief of symptoms. There were no complications. The patient was advised to ice the knee this evening and to avoid vigorous activities for the next 2 days. They were advised to call us if there was any erythema, enduration, swelling or increasing pain. The patient was provided with a post cortisone injection sheet today in the office. I will follow up with the patient in 4 weeks. If there are no improvements to her right knee symptoms, we will consider an MRI to rule out a medial mensicus tear. All the patient's questions were answered while in the clinic. The patient is understanding of all instructions and agrees with the plan. Approximately 30 minutes was spent on patient education and coordinating care. Follow up in: Return in about 4 weeks (around 8/10/2022). Sincerely,      I, Monie Llanos, am scribing for Dr. José Whitmoer MD.  07/13/22 1:55 PM Monie Llanos. The physical examination was performed between the patient and Dr. José Whitmore MD.  All counseling during the appointment was performed between the patient and the provider. José Whitmore MD [address] [address]  Email: [email or web address]  Office: [phone]    07/13/22  1:55 PM        The encounter with Trish Alex was carried out by myself, Dr Whitaker, who personally examined the patient and reviewed the plan. This dictation was performed with a verbal recognition program (DRAGON) and it was checked for errors. It is possible that there are still dictated errors within this office note. If so, please bring any errors to my attention for an addendum. All efforts were made to ensure that this office note is accurate.

## 2022-08-17 ENCOUNTER — OFFICE VISIT (OUTPATIENT)
Dept: ORTHOPEDIC SURGERY | Age: 72
End: 2022-08-17
Payer: MEDICARE

## 2022-08-17 VITALS — HEIGHT: 65 IN | WEIGHT: 158 LBS | BODY MASS INDEX: 26.33 KG/M2

## 2022-08-17 DIAGNOSIS — S83.8X1A SPRAIN OF MEDIAL MENISCUS OF RIGHT KNEE, INITIAL ENCOUNTER: ICD-10-CM

## 2022-08-17 DIAGNOSIS — M76.892 HIP ABDUCTOR TENDONITIS, LEFT: ICD-10-CM

## 2022-08-17 DIAGNOSIS — M24.152 DEGENERATIVE TEAR OF ACETABULAR LABRUM OF LEFT HIP: Primary | ICD-10-CM

## 2022-08-17 PROCEDURE — G8399 PT W/DXA RESULTS DOCUMENT: HCPCS | Performed by: ORTHOPAEDIC SURGERY

## 2022-08-17 PROCEDURE — 99213 OFFICE O/P EST LOW 20 MIN: CPT | Performed by: ORTHOPAEDIC SURGERY

## 2022-08-17 PROCEDURE — 3017F COLORECTAL CA SCREEN DOC REV: CPT | Performed by: ORTHOPAEDIC SURGERY

## 2022-08-17 PROCEDURE — 1090F PRES/ABSN URINE INCON ASSESS: CPT | Performed by: ORTHOPAEDIC SURGERY

## 2022-08-17 PROCEDURE — 1123F ACP DISCUSS/DSCN MKR DOCD: CPT | Performed by: ORTHOPAEDIC SURGERY

## 2022-08-17 PROCEDURE — G8417 CALC BMI ABV UP PARAM F/U: HCPCS | Performed by: ORTHOPAEDIC SURGERY

## 2022-08-17 PROCEDURE — G8427 DOCREV CUR MEDS BY ELIG CLIN: HCPCS | Performed by: ORTHOPAEDIC SURGERY

## 2022-08-17 PROCEDURE — 1036F TOBACCO NON-USER: CPT | Performed by: ORTHOPAEDIC SURGERY

## 2022-08-17 NOTE — PROGRESS NOTES
Chief Complaint  Hip Pain (F/U LEFT HIP/) and Knee Pain (F/U RIGHT KNEE)      History of Present Illness:  Reyna Ann is a pleasant 71 y.o. female who is here for reassessment several weeks post left hip intra-articular cortisone injection for a degenerative labral tear. Also post right knee cortisone injection for suspected meniscal strain. She is doing terrific. Both the hip and the knee are feeling much better. She now has regained full motion to her right knee. She no longer has mechanical symptoms. Her left hip is essentially asymptomatic but has the occasional lateral sided hip pain when she wakes up in the morning. She has intermittent anterior thigh numbness and tingling that goes down to the toe. Medical History:  Patient's medications, allergies, past medical, surgical, social and family histories were reviewed and updated as appropriate. Pain Assessment  Location of Pain: Hip  Location Modifiers: Left  Severity of Pain: 10  Quality of Pain: Dull, Aching (NUMB, TINGLING)  Duration of Pain: A few minutes  Frequency of Pain: Several days a week  Aggravating Factors:  (LAYING ON L SIDE)  Limiting Behavior: No  Relieving Factors:  (CHANGING POSITION)  Result of Injury: No  Work-Related Injury: No  Are there other pain locations you wish to document?: No  ROS: Review of systems reviewed from Patient History Form completed today and available in the patient's chart under the Media tab. Pertinent items are noted in HPI  Review of systems reviewed from Patient History Form completed today and available in the patient's chart under the Media tab. Vital Signs:  Ht 5' 4.5\" (1.638 m)   Wt 158 lb (71.7 kg)   BMI 26.70 kg/m²         Neuro: Alert & oriented x 3,  normal,  no focal deficits noted. Normal affect. Eyes: sclera clear  Ears: Normal external ear  Mouth:  No perioral lesions  Pulm: Respirations unlabored and regular  Pulse: Extremities well perfused.  2+ peripheral pulses. Skin: Warm. No ulcerations. Constitutional: The physical examination finds the patient to be well-developed and well-nourished. The patient is alert and oriented x3 and was cooperative throughout the visit. Hip Examination: left    Skin/Inspection: No skin lesions, cellulitis, or extreme edema in the lower extremities. Standing/Walking: normal gait, negative Trendelenburg sign. Supine/Side Lying Exam: Non tender around the major bony prominences  full range of motion  FADIR Negative  ELHAM Negative  Resisted Abduction  5/5   Resisted Adduction  5/5   Resisted  Flexion  5/5    not tender at greater trochanter    Distal Neurovascular exam is intact (foot sensation, pulses, and motor exam)       Diagnostics:        No new imaging was obtained today        Assessment: Patient is a 71 y.o. female with resolving left hip pain and right knee pain post cortisone injections intra-articularly in both. 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 8X1A    Hip abductor tendonitis, left     M76.892            Office Procedures:  No orders of the defined types were placed in this encounter. No orders of the defined types were placed in this encounter. Plan:  Gina Rodriguez is doing great. We recommend that She continuing with  home exercise program for advanced progression of motion, dynamic loading, and trunk/hip/core/thigh strengthening. All the patient's questions were answered while in the clinic. The patient is understanding of all instructions and agrees with the plan. Approximately 25 minutes was spent on patient education and coordinating care. Follow up in: Return if symptoms worsen or fail to improve.       Sincerely,    MD [facility] and [address] [place] [address], [address], [address]  Email: [email or web address]  Office: [phone]    08/17/22  1:22 PM        The encounter with Mynor Jack was carried out by myself, Dr David Mcdermott, who personally examined the patient and reviewed the plan. This dictation was performed with a verbal recognition program (DRAGON) and it was checked for errors. It is possible that there are still dictated errors within this office note. If so, please bring any errors to my attention for an addendum. All efforts were made to ensure that this office note is accurate.

## 2023-04-17 ENCOUNTER — OFFICE VISIT (OUTPATIENT)
Dept: ORTHOPEDIC SURGERY | Age: 73
End: 2023-04-17

## 2023-04-17 VITALS — BODY MASS INDEX: 26.33 KG/M2 | WEIGHT: 158 LBS | HEIGHT: 65 IN

## 2023-04-17 DIAGNOSIS — M24.152 DEGENERATIVE TEAR OF ACETABULAR LABRUM OF LEFT HIP: Primary | ICD-10-CM

## 2023-04-17 DIAGNOSIS — M76.892 HIP ABDUCTOR TENDONITIS, LEFT: ICD-10-CM

## 2023-04-17 RX ORDER — BUPIVACAINE HYDROCHLORIDE 2.5 MG/ML
16 INJECTION, SOLUTION INFILTRATION; PERINEURAL ONCE
Status: COMPLETED | OUTPATIENT
Start: 2023-04-17 | End: 2023-04-17

## 2023-04-17 RX ORDER — METHYLPREDNISOLONE ACETATE 40 MG/ML
80 INJECTION, SUSPENSION INTRA-ARTICULAR; INTRALESIONAL; INTRAMUSCULAR; SOFT TISSUE ONCE
Status: COMPLETED | OUTPATIENT
Start: 2023-04-17 | End: 2023-04-17

## 2023-04-17 RX ADMIN — METHYLPREDNISOLONE ACETATE 80 MG: 40 INJECTION, SUSPENSION INTRA-ARTICULAR; INTRALESIONAL; INTRAMUSCULAR; SOFT TISSUE at 16:22

## 2023-04-17 RX ADMIN — BUPIVACAINE HYDROCHLORIDE 40 MG: 2.5 INJECTION, SOLUTION INFILTRATION; PERINEURAL at 16:21

## 2023-04-17 NOTE — PROGRESS NOTES
4/17/23  4:07 PM        NDC: 61829-110-44   -   Marcaine 0.25 %    LOT: [id number]    COMMENT: LEFT HIP INTRA-ARTICULAR CORTISONE INJECTION      NDC: 6255-9158-82   -   Depo Medrol 40mg    LOT: [id number]    COMMENT: LEFT HIP INTRA-ARTICULAR CORTISONE INJECTION

## 2023-04-18 NOTE — PROGRESS NOTES
Chief Complaint  Hip Pain (Left hip/ req torin injection)      History of Present Illness:  Dean Martinez is a pleasant 71 y.o. female who is here for reassessment of recurrent left hip pain about 8 months post prior left hip cortisone injection. Without specific setback she is having recurring anterior/lateral hip pain and she has intermittent anterior thigh numbness and tingling that goes down to the toe. Medical History:  Patient's medications, allergies, past medical, surgical, social and family histories were reviewed and updated as appropriate. ROS: Review of systems reviewed from Patient History Form completed today and available in the patient's chart under the Media tab. Pertinent items are noted in HPI  Review of systems reviewed from Patient History Form completed today and available in the patient's chart under the Media tab. Vital Signs:  Ht 5' 4.5\" (1.638 m)   Wt 158 lb (71.7 kg)   BMI 26.70 kg/m²         Neuro: Alert & oriented x 3,  normal,  no focal deficits noted. Normal affect. Eyes: sclera clear  Ears: Normal external ear  Mouth:  No perioral lesions  Pulm: Respirations unlabored and regular  Pulse: Extremities well perfused. 2+ peripheral pulses. Skin: Warm. No ulcerations. Constitutional: The physical examination finds the patient to be well-developed and well-nourished. The patient is alert and oriented x3 and was cooperative throughout the visit. Hip Examination: left    Skin/Inspection: No skin lesions, cellulitis, or extreme edema in the lower extremities. Standing/Walking: normal gait, negative Trendelenburg sign.       Supine/Side Lying Exam: Non tender around the major bony prominences  full range of motion  FADIR Negative  ELHAM Negative  Resisted Abduction  5/5   Resisted Adduction  5/5   Resisted  Flexion  5/5    not tender at greater trochanter    Distal Neurovascular exam is intact (foot sensation, pulses, and motor exam)

## 2023-11-06 NOTE — OP NOTE
[facility]       [address] [facility], [address] [facility]       Operative note by  Jesús Petit. Tatiana Boudreaux MS, DO  Orthopedic surgeon  Orthopedics sports Fellowship trained  Board-certified  Team Physician for [facility]                       Shoulder Arthroscopy      Patient Name:  Eusebio Bhandari    YOB: 1950    Medical  Record number: [id number]    Account number:  [de-identified]    Date Of Surgery: 3/6/2018    Date Of Dictation: 3/6/2018    Location: [address]    Surgeon: Dr. Radhika Davis    Assistant: None    Anesthesia: Local with General    Indications:  Chronic pain not alleviated by conservative therapy, positive MRI, not improved with conservative care    Complications: None    Estimated blood loss: Minimal    Preoperative antibiotics: Given and documented in the chart        Preoperative diagnosis :  1.  Left shoulder subacromial impingement  2. Left shoulder distal clavicle arthritis  3. Left shoulder long head of the biceps tendinitis  4. Left shoulder rotator cuff tear          Postoperative diagnosis :  1.  Left shoulder subacromial impingement  2. Left shoulder distal clavicle arthritis  3. Left shoulder type I labral tear, synovitis,  4. Full-thickness supraspinatus rotator cuff tear        Procedure performed:  1. Left shoulder diagnostic arthroscopy  2. Left shoulder arthroscopic subacromial decompression  3. Left shoulder arthroscopic labral debridement, synovectomy, undersurface rotator cuff debridement  4. Left shoulder distal clavicle excision  5. Left shoulder full-thickness supraspinatus rotator cuff repair using a dual loaded 4.75 medial row 2 dual loaded lateral row suture anchors    6.   Left shoulder with left shoulder 20 mm x 26 mm Regeneten augmentation graft             Procedure in detail:  Patient was seen and evaluated A fraying. The synovitis was removed from the inferior recess posterior to the labrum superior to the labrum and anterior to the labrum. The labral tissue was smoothed off with a shaver and a bipolar both posterior superiorly and anteriorly. The undersurface rotator cuff fraying was removed 1st with a shaver then the edges were smoothed off with the bipolar. Any chondral surface fraying was removed either with the shaver, bipolar or probe. Following my standard diagnostic arthroscopy the cannula was removed from the glenohumeral joint. The blunt trocar was inserted into the cannula and through the posterior portal the cannula was entered into the subacromial space without any difficulty. Now under direct visualization we could see there was moderate bursitis, synovitis, a subacromial spur and distal clavicle spur. Under direct visualization I put a 18-gauge spinal needle laterally through the deltoid into the subacromial space, liking this location I made my portal with a sharp scalpel and a blunt trocar. At this point I entered the subacromial space with a shaver and I removed all the bursa tissue synovial tissue and tissue lining the subacromial joint space and also the bone spur and around the distal clavicle. I used the bipolar to remove all soft tissue from the undersurface bone spur and the distal clavicle spur. I removed the bipolar tissue ablator and entered with the 5.5 mm  barrel bur and proceeded to perform a generous subacromial decompression through the lateral portal and through the posterior portal.      Next I went ahead and addressed the distal clavicle with a shaver and a bipolar through the lateral portal and then resecting 5 mm to the level of the subacromial decompression. I also used the shaver the bipolar and the 5.5 mm barrel bur to undermine the distal clavicle to make sure there was no impingement on the rotator cuff tissue.   I now removed 3 (mild pain)

## 2024-02-13 ENCOUNTER — TELEPHONE (OUTPATIENT)
Dept: ORTHOPEDIC SURGERY | Age: 74
End: 2024-02-13

## 2024-02-26 ENCOUNTER — OFFICE VISIT (OUTPATIENT)
Dept: ORTHOPEDIC SURGERY | Age: 74
End: 2024-02-26
Payer: MEDICARE

## 2024-02-26 VITALS — HEIGHT: 65 IN | BODY MASS INDEX: 25.83 KG/M2 | WEIGHT: 155 LBS

## 2024-02-26 DIAGNOSIS — M76.892 HIP ABDUCTOR TENDONITIS, LEFT: ICD-10-CM

## 2024-02-26 DIAGNOSIS — M24.152 DEGENERATIVE TEAR OF ACETABULAR LABRUM OF LEFT HIP: ICD-10-CM

## 2024-02-26 DIAGNOSIS — M16.12 PRIMARY OSTEOARTHRITIS OF LEFT HIP: Primary | ICD-10-CM

## 2024-02-26 PROCEDURE — 1123F ACP DISCUSS/DSCN MKR DOCD: CPT | Performed by: ORTHOPAEDIC SURGERY

## 2024-02-26 PROCEDURE — G8484 FLU IMMUNIZE NO ADMIN: HCPCS | Performed by: ORTHOPAEDIC SURGERY

## 2024-02-26 PROCEDURE — 99214 OFFICE O/P EST MOD 30 MIN: CPT | Performed by: ORTHOPAEDIC SURGERY

## 2024-02-26 PROCEDURE — G8417 CALC BMI ABV UP PARAM F/U: HCPCS | Performed by: ORTHOPAEDIC SURGERY

## 2024-02-26 PROCEDURE — 1090F PRES/ABSN URINE INCON ASSESS: CPT | Performed by: ORTHOPAEDIC SURGERY

## 2024-02-26 PROCEDURE — 3017F COLORECTAL CA SCREEN DOC REV: CPT | Performed by: ORTHOPAEDIC SURGERY

## 2024-02-26 PROCEDURE — G8427 DOCREV CUR MEDS BY ELIG CLIN: HCPCS | Performed by: ORTHOPAEDIC SURGERY

## 2024-02-26 PROCEDURE — 1036F TOBACCO NON-USER: CPT | Performed by: ORTHOPAEDIC SURGERY

## 2024-02-26 PROCEDURE — G8399 PT W/DXA RESULTS DOCUMENT: HCPCS | Performed by: ORTHOPAEDIC SURGERY

## 2024-02-26 NOTE — PROGRESS NOTES
Chief Complaint  Hip Pain (Left hip)      History of Present Illness:  Scarlett Doran is a pleasant 73 y.o. female who presents for follow-up evaluation of her left hip pain secondary to mild left hip osteoarthritis, greater trochanteric bursitis and mild abductor tendinitis.  Her last visit with our office wound on 4/17/2023.  At that time she underwent an intra-articular corticosteroid injection and she reports she did well up until a few weeks ago when she hurt her back.  Prior to this she was having no left sided hip pain.  She is here today hoping for a repeat injection.  She locates her pain to the anterior groin as well as laterally over the greater trochanter.  She states she gets this pain daily and is worse with activity      Medical History:  Patient's medications, allergies, past medical, surgical, social and family histories were reviewed and updated as appropriate.    Pain Assessment  Location of Pain: Hip  Location Modifiers: Left  Severity of Pain: 5  Quality of Pain: Sharp, Aching  Frequency of Pain: Intermittent  Aggravating Factors: Walking  Limiting Behavior: Some  Relieving Factors: Nsaids  Result of Injury: No  Work-Related Injury: No  ROS: Review of systems reviewed from Patient History Form completed today and available in the patient's chart under the Media tab.      Pertinent items are noted in HPI  Review of systems reviewed from Patient History Form completed today and available in the patient's chart under the Media tab.       Vital Signs:  Ht 1.638 m (5' 4.5\")   Wt 70.3 kg (155 lb)   BMI 26.19 kg/m²         Neuro: Alert & oriented x 3,  normal,  no focal deficits noted. Normal affect.  Eyes: sclera clear  Ears: Normal external ear  Mouth:  No perioral lesions  Pulm: Respirations unlabored and regular  Pulse: Extremities well perfused. 2+ peripheral pulses.  Skin: Warm. No ulcerations.      Constitutional: The physical examination finds the patient to be well-developed and

## 2024-03-15 DIAGNOSIS — M24.152 DEGENERATIVE TEAR OF ACETABULAR LABRUM OF LEFT HIP: ICD-10-CM

## 2024-03-15 DIAGNOSIS — M76.892 HIP ABDUCTOR TENDONITIS, LEFT: ICD-10-CM

## 2024-03-15 DIAGNOSIS — M16.12 PRIMARY OSTEOARTHRITIS OF LEFT HIP: ICD-10-CM

## 2024-03-18 ENCOUNTER — OFFICE VISIT (OUTPATIENT)
Dept: ORTHOPEDIC SURGERY | Age: 74
End: 2024-03-18
Payer: MEDICARE

## 2024-03-18 VITALS — BODY MASS INDEX: 25.83 KG/M2 | HEIGHT: 65 IN | WEIGHT: 155 LBS

## 2024-03-18 DIAGNOSIS — M16.12 PRIMARY OSTEOARTHRITIS OF LEFT HIP: ICD-10-CM

## 2024-03-18 DIAGNOSIS — M51.26 HNP (HERNIATED NUCLEUS PULPOSUS), LUMBAR: Primary | ICD-10-CM

## 2024-03-18 DIAGNOSIS — M76.892 HIP ABDUCTOR TENDONITIS, LEFT: ICD-10-CM

## 2024-03-18 PROCEDURE — 3017F COLORECTAL CA SCREEN DOC REV: CPT | Performed by: ORTHOPAEDIC SURGERY

## 2024-03-18 PROCEDURE — 1090F PRES/ABSN URINE INCON ASSESS: CPT | Performed by: ORTHOPAEDIC SURGERY

## 2024-03-18 PROCEDURE — 1036F TOBACCO NON-USER: CPT | Performed by: ORTHOPAEDIC SURGERY

## 2024-03-18 PROCEDURE — 99214 OFFICE O/P EST MOD 30 MIN: CPT | Performed by: ORTHOPAEDIC SURGERY

## 2024-03-18 PROCEDURE — G8484 FLU IMMUNIZE NO ADMIN: HCPCS | Performed by: ORTHOPAEDIC SURGERY

## 2024-03-18 PROCEDURE — 1123F ACP DISCUSS/DSCN MKR DOCD: CPT | Performed by: ORTHOPAEDIC SURGERY

## 2024-03-18 PROCEDURE — G8428 CUR MEDS NOT DOCUMENT: HCPCS | Performed by: ORTHOPAEDIC SURGERY

## 2024-03-18 PROCEDURE — G8417 CALC BMI ABV UP PARAM F/U: HCPCS | Performed by: ORTHOPAEDIC SURGERY

## 2024-03-18 PROCEDURE — G8399 PT W/DXA RESULTS DOCUMENT: HCPCS | Performed by: ORTHOPAEDIC SURGERY

## 2024-03-18 NOTE — PROGRESS NOTES
extreme edema in the lower extremities.      Standing/Walking: normal gait, negative Trendelenburg sign.       Supine/Side Lying Exam: Non tender around the major bony prominences  full range of motion  Flexion arc 0 to 110 degrees flexion arc, IR 30 degrees, ER 45 degrees   Deep Flexion test less Positive  FADIR less Positive  ELHAM negative  Resisted Abduction 4+/5  Resisted Adduction 5/5   Resisted  Flexion 5/5   Moderately tender at greater trochanter, and abductor insertion        Distal Neurovascular exam is intact (foot sensation, pulses, and motor exam)    Diagnostics:  3 View X-rays (AP Pelvis, Davis View and False Profile) of both the  left hip were obtained and reviewed in office.  Impression: Mild left hip osteoarthritis with enthesophytes noted at abductor insertion.  No bone lesions or cortical erosions.  No fracture or dislocation.    An MRI of the left hip with contrast was obtained on 3/13/2024.  This demonstrated moderate left femoral acetabular arthropathy including joint space loss and osteochondral erosions and bone spurring.  There is moderate gluteus minimus and medius Case tendinitis with no obvious retracted tear.  There is mild hamstring tendinosis.        Assessment: Patient is a 73 y.o. female with left hip pain secondary to mild osteoarthritis and degenerative labral tearing as well as abductor tendinopathy but no full thickness tear and intact abductor strength on exam.    Impression:  Visit Diagnoses         Codes    HNP (herniated nucleus pulposus), lumbar    -  Primary M51.26    Primary osteoarthritis of left hip     M16.12    Hip abductor tendonitis, left     M76.892            Office Procedures:  No orders of the defined types were placed in this encounter.    Orders Placed This Encounter   Procedures    Gifty Wyman PA, Orthopedic Surgery (Spine), [facility]     Referral Priority:   Routine     Referral Type:   Eval and Treat     Referral Reason:   Specialty

## 2024-03-19 DIAGNOSIS — M76.892 HIP ABDUCTOR TENDONITIS, LEFT: ICD-10-CM

## 2024-03-19 DIAGNOSIS — M24.152 DEGENERATIVE TEAR OF ACETABULAR LABRUM OF LEFT HIP: ICD-10-CM

## 2024-03-19 DIAGNOSIS — M16.12 PRIMARY OSTEOARTHRITIS OF LEFT HIP: ICD-10-CM

## 2024-06-14 ENCOUNTER — TELEPHONE (OUTPATIENT)
Dept: ORTHOPEDIC SURGERY | Age: 74
End: 2024-06-14

## 2024-06-14 DIAGNOSIS — M16.12 PRIMARY OSTEOARTHRITIS OF LEFT HIP: Primary | ICD-10-CM

## 2024-06-14 NOTE — TELEPHONE ENCOUNTER
General Question     Subject: MEDICATION  Patient and /or Facility Request: PATIENT CALLED STATES THAT SHE WOULD LIKE TO SPEAK WITH SOMEONE REGARDING MEDICATION.   Contact Number: [phone]

## 2024-06-18 NOTE — TELEPHONE ENCOUNTER
OTHER    PATIENT CALLED TO SPEAK WITH CLINICAL-KHLOE TO ADDRESS MEDICATION QUESTIONS    PLEASE ADVISE

## 2024-06-19 NOTE — TELEPHONE ENCOUNTER
S/w patient.  She wishes to proceed with HA injection for left hip.  Order placed and patient will be contacted to schedule once approved.  All questions answered.

## 2024-06-21 ENCOUNTER — TELEPHONE (OUTPATIENT)
Dept: ORTHOPEDIC SURGERY | Age: 74
End: 2024-06-21

## 2024-06-21 NOTE — TELEPHONE ENCOUNTER
S/w patient.  Gisela left hip approved.  NPR.  She will call back at a later date to schedule.  Patient may be scheduled into any open follow up slot upon return call.

## 2024-07-08 ENCOUNTER — OFFICE VISIT (OUTPATIENT)
Dept: ORTHOPEDIC SURGERY | Age: 74
End: 2024-07-08
Payer: MEDICARE

## 2024-07-08 VITALS — BODY MASS INDEX: 25.83 KG/M2 | WEIGHT: 155 LBS | HEIGHT: 65 IN

## 2024-07-08 DIAGNOSIS — M16.12 PRIMARY OSTEOARTHRITIS OF LEFT HIP: Primary | ICD-10-CM

## 2024-07-08 PROCEDURE — 20611 DRAIN/INJ JOINT/BURSA W/US: CPT | Performed by: ORTHOPAEDIC SURGERY

## 2024-07-08 RX ORDER — LIDOCAINE HYDROCHLORIDE 10 MG/ML
10 INJECTION, SOLUTION INFILTRATION; PERINEURAL ONCE
Status: COMPLETED | OUTPATIENT
Start: 2024-07-08 | End: 2024-07-08

## 2024-07-08 RX ORDER — ROPIVACAINE HYDROCHLORIDE 5 MG/ML
10 INJECTION, SOLUTION EPIDURAL; INFILTRATION; PERINEURAL ONCE
Status: CANCELLED | OUTPATIENT
Start: 2024-07-08 | End: 2024-07-08

## 2024-07-08 RX ADMIN — LIDOCAINE HYDROCHLORIDE 10 ML: 10 INJECTION, SOLUTION INFILTRATION; PERINEURAL at 13:19

## 2024-07-08 NOTE — PROGRESS NOTES
Chief Complaint  Hip Pain (Left hip-durolane injection)      History of Present Illness:  Scarlett Doran is a pleasant 73 y.o. female here for pre scheduled left hip durolane injection for moderate OA and glute tendinitis.     An MRI of the left hip with contrast was obtained on 3/13/2024.  This demonstrated moderate left femoral acetabular arthropathy including joint space loss and osteochondral erosions and bone spurring.  There is moderate gluteus minimus and medius Case tendinitis with no obvious retracted tear.  There is mild hamstring tendinosis.      Medical History:  Patient's medications, allergies, past medical, surgical, social and family histories were reviewed and updated as appropriate.    Pain Assessment  Location of Pain: Hip  Location Modifiers: Left  Severity of Pain: 4  Quality of Pain: Sharp  Frequency of Pain: Intermittent  Aggravating Factors: Walking  Limiting Behavior: Yes  Relieving Factors: Rest  Result of Injury: No  Work-Related Injury: No  ROS: Review of systems reviewed from Patient History Form completed today and available in the patient's chart under the Media tab.      Pertinent items are noted in HPI  Review of systems reviewed from Patient History Form completed today and available in the patient's chart under the Media tab.       Vital Signs:  Ht 1.651 m (5' 5\")   Wt 70.3 kg (155 lb)   BMI 25.79 kg/m²         Neuro: Alert & oriented x 3,  normal,  no focal deficits noted. Normal affect.  Eyes: sclera clear  Ears: Normal external ear  Mouth:  No perioral lesions  Pulm: Respirations unlabored and regular  Pulse: Extremities well perfused. 2+ peripheral pulses.  Skin: Warm. No ulcerations.      Constitutional: The physical examination finds the patient to be well-developed and well-nourished.  The patient is alert and oriented x3 and was cooperative throughout the visit.    Hip Examination: left    Skin/Inspection: No skin lesions, cellulitis, or extreme edema in the lower

## 2024-07-08 NOTE — PROGRESS NOTES
7/8/24  12:52 PM        NDC: 75778-825-58   -   Lidocaine 1.0 %    LOT: [id number]    COMMENT: LEFT HIP INTRA-ARTICULAR DUROLANE INJECTION

## 2024-07-10 ENCOUNTER — TELEPHONE (OUTPATIENT)
Dept: ORTHOPEDIC SURGERY | Age: 74
End: 2024-07-10

## 2024-07-10 NOTE — TELEPHONE ENCOUNTER
Other PATIENT IS CALLING IN REQUESTING A CALL BACK. SHE  IS EXPERIENCING INCREASED PAIN AFTER INJECTION AND TINGLING GOING DOWN THE LEG INTO FOOT. [phone]

## 2024-07-10 NOTE — TELEPHONE ENCOUNTER
Spoke to patient. She is having numbness in the foot following injection which could be from marcaine.     Will check in tomorrow to see if numbness has subsided.      BEULAH Bagley

## 2024-09-27 ENCOUNTER — TELEPHONE (OUTPATIENT)
Dept: ORTHOPEDIC SURGERY | Age: 74
End: 2024-09-27

## 2024-10-07 ENCOUNTER — OFFICE VISIT (OUTPATIENT)
Dept: ORTHOPEDIC SURGERY | Age: 74
End: 2024-10-07
Payer: MEDICARE

## 2024-10-07 VITALS — RESPIRATION RATE: 12 BRPM | HEIGHT: 65 IN | BODY MASS INDEX: 25.83 KG/M2 | WEIGHT: 155 LBS

## 2024-10-07 DIAGNOSIS — M16.12 PRIMARY OSTEOARTHRITIS OF LEFT HIP: Primary | ICD-10-CM

## 2024-10-07 PROCEDURE — 1123F ACP DISCUSS/DSCN MKR DOCD: CPT | Performed by: ORTHOPAEDIC SURGERY

## 2024-10-07 PROCEDURE — 1090F PRES/ABSN URINE INCON ASSESS: CPT | Performed by: ORTHOPAEDIC SURGERY

## 2024-10-07 PROCEDURE — 20611 DRAIN/INJ JOINT/BURSA W/US: CPT | Performed by: ORTHOPAEDIC SURGERY

## 2024-10-07 PROCEDURE — G8484 FLU IMMUNIZE NO ADMIN: HCPCS | Performed by: ORTHOPAEDIC SURGERY

## 2024-10-07 PROCEDURE — G8399 PT W/DXA RESULTS DOCUMENT: HCPCS | Performed by: ORTHOPAEDIC SURGERY

## 2024-10-07 PROCEDURE — G8427 DOCREV CUR MEDS BY ELIG CLIN: HCPCS | Performed by: ORTHOPAEDIC SURGERY

## 2024-10-07 PROCEDURE — 3017F COLORECTAL CA SCREEN DOC REV: CPT | Performed by: ORTHOPAEDIC SURGERY

## 2024-10-07 PROCEDURE — G8417 CALC BMI ABV UP PARAM F/U: HCPCS | Performed by: ORTHOPAEDIC SURGERY

## 2024-10-07 PROCEDURE — 1036F TOBACCO NON-USER: CPT | Performed by: ORTHOPAEDIC SURGERY

## 2024-10-07 PROCEDURE — 99214 OFFICE O/P EST MOD 30 MIN: CPT | Performed by: ORTHOPAEDIC SURGERY

## 2024-10-07 RX ORDER — ROPIVACAINE HYDROCHLORIDE 5 MG/ML
14 INJECTION, SOLUTION EPIDURAL; INFILTRATION; PERINEURAL ONCE
Status: COMPLETED | OUTPATIENT
Start: 2024-10-07 | End: 2024-10-07

## 2024-10-07 RX ORDER — METHYLPREDNISOLONE ACETATE 40 MG/ML
80 INJECTION, SUSPENSION INTRA-ARTICULAR; INTRALESIONAL; INTRAMUSCULAR; SOFT TISSUE ONCE
Status: COMPLETED | OUTPATIENT
Start: 2024-10-07 | End: 2024-10-07

## 2024-10-07 RX ADMIN — ROPIVACAINE HYDROCHLORIDE 14 ML: 5 INJECTION, SOLUTION EPIDURAL; INFILTRATION; PERINEURAL at 10:38

## 2024-10-07 RX ADMIN — METHYLPREDNISOLONE ACETATE 80 MG: 40 INJECTION, SUSPENSION INTRA-ARTICULAR; INTRALESIONAL; INTRAMUSCULAR; SOFT TISSUE at 10:38

## 2024-10-07 NOTE — PROGRESS NOTES
10/7/24  9:51 AM        NDC: 20934-880-14   -   Ropivacaine 0.5 %    LOT: [id number]    COMMENT: LEFT HIP INTRA-ARTICULAR CORTISONE INJECTION      NDC: 7279-3480-89   -   Depo Medrol 40mg    LOT: [id number]    COMMENT: LEFT HIP INTRA-ARTICULAR CORTISONE INJECTION         
for preoperative evaluation.    All the patient's questions were answered while in the clinic.  The patient is understanding of all instructions and agrees with the plan.    I spent 30  minutes on patient education and coordinating care today. This included time examining the patient, reviewing the patient's chart and relevant imaging, and chart documentation. This excluded any separately billed procedures.      Follow up in: Return in about 6 weeks (around 11/18/2024).         Derek Bai MD  Orthopedic Surgery Sports Medicine Fellow     10/07/24  10:40 AM         Sincerely,    Natalia Delgado MD [facility]  Orthopaedic Surgeon - Hip Preservation & Sports Medicine   [facility]   [address] [place] [address], [address], [address]  Office: [phone]    10/07/24  2:53 PM    The encounter with Scarlett Doran was carried out by myself, Dr Delgado, who personally examined the patient and reviewed the plan.      This dictation was performed with a verbal recognition program (DRAGON) and it was checked for errors.  It is possible that there are still dictated errors within this office note.  If so, please bring any errors to my attention for an addendum.  All efforts were made to ensure that this office note is accurate.

## 2024-11-18 ENCOUNTER — OFFICE VISIT (OUTPATIENT)
Dept: ORTHOPEDIC SURGERY | Age: 74
End: 2024-11-18
Payer: MEDICARE

## 2024-11-18 VITALS — HEIGHT: 65 IN | BODY MASS INDEX: 24.99 KG/M2 | WEIGHT: 150 LBS

## 2024-11-18 DIAGNOSIS — M16.12 PRIMARY OSTEOARTHRITIS OF LEFT HIP: Primary | ICD-10-CM

## 2024-11-18 PROCEDURE — 1125F AMNT PAIN NOTED PAIN PRSNT: CPT | Performed by: ORTHOPAEDIC SURGERY

## 2024-11-18 PROCEDURE — G8399 PT W/DXA RESULTS DOCUMENT: HCPCS | Performed by: ORTHOPAEDIC SURGERY

## 2024-11-18 PROCEDURE — 1036F TOBACCO NON-USER: CPT | Performed by: ORTHOPAEDIC SURGERY

## 2024-11-18 PROCEDURE — 99213 OFFICE O/P EST LOW 20 MIN: CPT | Performed by: ORTHOPAEDIC SURGERY

## 2024-11-18 PROCEDURE — 1090F PRES/ABSN URINE INCON ASSESS: CPT | Performed by: ORTHOPAEDIC SURGERY

## 2024-11-18 PROCEDURE — G8420 CALC BMI NORM PARAMETERS: HCPCS | Performed by: ORTHOPAEDIC SURGERY

## 2024-11-18 PROCEDURE — 1159F MED LIST DOCD IN RCRD: CPT | Performed by: ORTHOPAEDIC SURGERY

## 2024-11-18 PROCEDURE — 3017F COLORECTAL CA SCREEN DOC REV: CPT | Performed by: ORTHOPAEDIC SURGERY

## 2024-11-18 PROCEDURE — G8427 DOCREV CUR MEDS BY ELIG CLIN: HCPCS | Performed by: ORTHOPAEDIC SURGERY

## 2024-11-18 PROCEDURE — 1123F ACP DISCUSS/DSCN MKR DOCD: CPT | Performed by: ORTHOPAEDIC SURGERY

## 2024-11-18 PROCEDURE — G8484 FLU IMMUNIZE NO ADMIN: HCPCS | Performed by: ORTHOPAEDIC SURGERY

## 2024-11-18 NOTE — PROGRESS NOTES
crutch/   teaching, stair navigation and general precautions      All the patient's questions were answered while in the clinic.  The patient is understanding of all instructions and agrees with the plan.    I spent 25 minutes on patient education and coordinating care today. This included time examining the patient, reviewing the patient's chart and relevant imaging, and chart documentation. This excluded any separately billed procedures.      Follow up in: No follow-ups on file.         Sincerely,    Natalia Delgado MD [facility]  Orthopaedic Surgeon - Hip Preservation & Sports Medicine   [facility] [address], [address], [address]  Email: [email or web address]  Office: [phone]    11/18/24  3:02 PM        The encounter with Scarlett Doran was carried out by myself, Dr Delgado, who personally examined the patient and reviewed the plan.      This dictation was performed with a verbal recognition program (DRAGON) and it was checked for errors.  It is possible that there are still dictated errors within this office note.  If so, please bring any errors to my attention for an addendum.  All efforts were made to ensure that this office note is accurate.

## 2024-11-20 ENCOUNTER — TELEPHONE (OUTPATIENT)
Dept: ORTHOPEDIC SURGERY | Age: 74
End: 2024-11-20

## 2024-11-20 NOTE — TELEPHONE ENCOUNTER
Surgery and/or Procedure Scheduling     Contact Name: Scarlett Doran \"Paige\"   Surgical/Procedure Request: L CECY  Patient Contact Number: [phone]

## 2024-11-22 ENCOUNTER — PREP FOR PROCEDURE (OUTPATIENT)
Dept: ORTHOPEDIC SURGERY | Age: 74
End: 2024-11-22

## 2024-11-22 DIAGNOSIS — M16.12 PRIMARY OSTEOARTHRITIS OF LEFT HIP: Primary | ICD-10-CM

## 2024-11-22 DIAGNOSIS — Z01.818 PREOPERATIVE CLEARANCE: ICD-10-CM

## 2024-11-22 DIAGNOSIS — M16.12 PRIMARY OSTEOARTHRITIS OF LEFT HIP: ICD-10-CM

## 2024-12-10 ENCOUNTER — HOSPITAL ENCOUNTER (OUTPATIENT)
Dept: PHYSICAL THERAPY | Age: 74
Setting detail: THERAPIES SERIES
Discharge: HOME | End: 2024-12-10
Attending: ORTHOPAEDIC SURGERY
Payer: MEDICARE

## 2024-12-10 DIAGNOSIS — R29.898 WEAKNESS OF LEFT HIP: Primary | ICD-10-CM

## 2024-12-10 DIAGNOSIS — Z74.09 IMPAIRED FUNCTIONAL MOBILITY, BALANCE, GAIT, AND ENDURANCE: ICD-10-CM

## 2024-12-10 PROBLEM — M16.12 PRIMARY OSTEOARTHRITIS OF LEFT HIP: Status: ACTIVE | Noted: 2024-11-22

## 2024-12-10 PROCEDURE — 97530 THERAPEUTIC ACTIVITIES: CPT

## 2024-12-10 PROCEDURE — 97161 PT EVAL LOW COMPLEX 20 MIN: CPT

## 2024-12-10 PROCEDURE — 97110 THERAPEUTIC EXERCISES: CPT

## 2024-12-10 NOTE — PLAN OF CARE
[facility] - [facility] [address], [place] [address] office: [phone] fax: [phone]    Physical Therapy Initial Evaluation Certification      Dear Natalia Delgado MD,    We had the pleasure of evaluating the following patient for physical therapy services at [facility].  A summary of our findings can be found in the initial assessment below.  This includes our plan of care.  If you have any questions or concerns regarding these findings, please do not hesitate to contact me at the office phone number listed above.  Thank you for the referral.     Physician Signature:_______________________________Date:__________________  By signing above (or electronic signature), therapist’s plan is approved by physician       Physical Therapy: TREATMENT/PROGRESS NOTE   Patient: Scarlett Doran (74 y.o. female)   Examination Date: 12/10/2024   :  1950 MRN: [id number]   Visit #: 1   Insurance Allowable Auth Needed   Medicare []Yes    [x]No    Insurance: Payor: MEDICARE / Plan: MEDICARE PART A AND B / Product Type: *No Product type* /   Insurance ID: [id number] - (Medicare)  Secondary Insurance (if applicable): [facility]   Treatment Diagnosis:     ICD-10-CM    1. Weakness of left hip  R29.898       2. Impaired functional mobility, balance, gait, and endurance  Z74.09          Medical Diagnosis:  Primary osteoarthritis of left hip [M16.12]   Referring Physician: Natalia Delgado MD  PCP: Viet Moe MD     Plan of care signed (Y/N):     Date of Patient follow up with Physician:      Plan of Care Report: EVAL today  POC update due: (10 visits /OR AUTH LIMITS, whichever is less)  RE-EVAL                                             Medical History:  Comorbidities:  Hypertension  Other Cardio/Pulmonary Conditions: A-FIB  Prior Surgeries: RTC REPAIR, BUNIONECTOMY  Relevant Medical History: SEE ABOVE

## 2024-12-13 ENCOUNTER — TELEPHONE (OUTPATIENT)
Dept: ORTHOPEDIC SURGERY | Age: 74
End: 2024-12-13

## 2024-12-16 ENCOUNTER — TELEPHONE (OUTPATIENT)
Dept: ORTHOPEDIC SURGERY | Age: 74
End: 2024-12-16

## 2024-12-17 ENCOUNTER — TELEPHONE (OUTPATIENT)
Dept: ORTHOPEDIC SURGERY | Age: 74
End: 2024-12-17

## 2024-12-17 NOTE — TELEPHONE ENCOUNTER
General Question     Subject: PRE OP QUESTION  Patient and /or Facility Request: Scarlett Doran \"Paige\"   Contact Number: [phone]     PATIENT REQUESTED CALL BACK FROM RYAN ABOUT UPCOMING SX QUESTIONS

## 2025-01-02 ENCOUNTER — HOSPITAL ENCOUNTER (OUTPATIENT)
Age: 75
Discharge: HOME | End: 2025-01-02
Payer: MEDICARE

## 2025-01-02 DIAGNOSIS — Z01.818 PREOPERATIVE CLEARANCE: ICD-10-CM

## 2025-01-02 LAB
25(OH)D3 SERPL-MCNC: 33.8 NG/ML
ALBUMIN SERPL-MCNC: 4.7 G/DL (ref 3.4–5)
ALBUMIN/GLOB SERPL: 1.7 {RATIO} (ref 1.1–2.2)
ALP SERPL-CCNC: 50 U/L (ref 40–129)
ALT SERPL-CCNC: 13 U/L (ref 10–40)
ANION GAP SERPL CALCULATED.3IONS-SCNC: 11 MMOL/L (ref 3–16)
APTT BLD: 28.3 SEC (ref 22.1–36.4)
AST SERPL-CCNC: 20 U/L (ref 15–37)
BACTERIA URNS QL MICRO: NORMAL /HPF
BASOPHILS # BLD: 0 K/UL (ref 0–0.2)
BASOPHILS NFR BLD: 0.3 %
BILIRUB SERPL-MCNC: 0.5 MG/DL (ref 0–1)
BILIRUB UR QL STRIP.AUTO: NEGATIVE
BUN SERPL-MCNC: 9 MG/DL (ref 7–20)
CALCIUM SERPL-MCNC: 9.3 MG/DL (ref 8.3–10.6)
CHLORIDE SERPL-SCNC: 96 MMOL/L (ref 99–110)
CLARITY UR: ABNORMAL
CO2 SERPL-SCNC: 26 MMOL/L (ref 21–32)
COLOR UR: ABNORMAL
CREAT SERPL-MCNC: 0.6 MG/DL (ref 0.6–1.2)
DEPRECATED RDW RBC AUTO: 13.2 % (ref 12.4–15.4)
EOSINOPHIL # BLD: 0.1 K/UL (ref 0–0.6)
EOSINOPHIL NFR BLD: 0.9 %
EPI CELLS #/AREA URNS AUTO: 1 /HPF (ref 0–5)
EST. AVERAGE GLUCOSE BLD GHB EST-MCNC: 96.8 MG/DL
GFR SERPLBLD CREATININE-BSD FMLA CKD-EPI: >90 ML/MIN/{1.73_M2}
GLUCOSE SERPL-MCNC: 94 MG/DL (ref 70–99)
GLUCOSE UR STRIP.AUTO-MCNC: NEGATIVE MG/DL
HBA1C MFR BLD: 5 %
HCT VFR BLD AUTO: 41.5 % (ref 36–48)
HGB BLD-MCNC: 13.8 G/DL (ref 12–16)
HGB UR QL STRIP.AUTO: NEGATIVE
HYALINE CASTS #/AREA URNS AUTO: 0 /LPF (ref 0–8)
INR PPP: 1.08 (ref 0.85–1.15)
KETONES UR STRIP.AUTO-MCNC: ABNORMAL MG/DL
LEUKOCYTE ESTERASE UR QL STRIP.AUTO: ABNORMAL
LYMPHOCYTES # BLD: 1.9 K/UL (ref 1–5.1)
LYMPHOCYTES NFR BLD: 22.7 %
MCH RBC QN AUTO: 31.9 PG (ref 26–34)
MCHC RBC AUTO-ENTMCNC: 33.3 G/DL (ref 31–36)
MCV RBC AUTO: 95.7 FL (ref 80–100)
MONOCYTES # BLD: 0.8 K/UL (ref 0–1.3)
MONOCYTES NFR BLD: 10.2 %
NEUTROPHILS # BLD: 5.5 K/UL (ref 1.7–7.7)
NEUTROPHILS NFR BLD: 65.9 %
NITRITE UR QL STRIP.AUTO: NEGATIVE
PH UR STRIP.AUTO: 7.5 [PH] (ref 5–8)
PLATELET # BLD AUTO: 272 K/UL (ref 135–450)
PMV BLD AUTO: 8.5 FL (ref 5–10.5)
POTASSIUM SERPL-SCNC: 4.2 MMOL/L (ref 3.5–5.1)
PROT SERPL-MCNC: 7.4 G/DL (ref 6.4–8.2)
PROT UR STRIP.AUTO-MCNC: NEGATIVE MG/DL
PROTHROMBIN TIME: 14.2 SEC (ref 11.9–14.9)
RBC # BLD AUTO: 4.34 M/UL (ref 4–5.2)
RBC CLUMPS #/AREA URNS AUTO: 4 /HPF (ref 0–4)
SODIUM SERPL-SCNC: 133 MMOL/L (ref 136–145)
SP GR UR STRIP.AUTO: 1.01 (ref 1–1.03)
UA COMPLETE W REFLEX CULTURE PNL UR: ABNORMAL
UA DIPSTICK W REFLEX MICRO PNL UR: YES
URN SPEC COLLECT METH UR: ABNORMAL
UROBILINOGEN UR STRIP-ACNC: 1 E.U./DL
WBC # BLD AUTO: 8.3 K/UL (ref 4–11)
WBC #/AREA URNS AUTO: 1 /HPF (ref 0–5)

## 2025-01-02 PROCEDURE — 36415 COLL VENOUS BLD VENIPUNCTURE: CPT

## 2025-01-02 PROCEDURE — 83036 HEMOGLOBIN GLYCOSYLATED A1C: CPT

## 2025-01-02 PROCEDURE — 85610 PROTHROMBIN TIME: CPT

## 2025-01-02 PROCEDURE — 85730 THROMBOPLASTIN TIME PARTIAL: CPT

## 2025-01-02 PROCEDURE — 80053 COMPREHEN METABOLIC PANEL: CPT

## 2025-01-02 PROCEDURE — 87081 CULTURE SCREEN ONLY: CPT

## 2025-01-02 PROCEDURE — 81001 URINALYSIS AUTO W/SCOPE: CPT

## 2025-01-02 PROCEDURE — 85025 COMPLETE CBC W/AUTO DIFF WBC: CPT

## 2025-01-02 PROCEDURE — 82306 VITAMIN D 25 HYDROXY: CPT

## 2025-01-05 LAB — MRSA SPEC QL CULT: NORMAL

## 2025-01-09 ENCOUNTER — OFFICE VISIT (OUTPATIENT)
Dept: ORTHOPEDIC SURGERY | Age: 75
End: 2025-01-09

## 2025-01-09 VITALS — WEIGHT: 148 LBS | HEIGHT: 65 IN | BODY MASS INDEX: 24.66 KG/M2

## 2025-01-09 DIAGNOSIS — M16.12 PRIMARY OSTEOARTHRITIS OF LEFT HIP: Primary | ICD-10-CM

## 2025-01-09 RX ORDER — HYDROCODONE BITARTRATE AND ACETAMINOPHEN 5; 325 MG/1; MG/1
1 TABLET ORAL EVERY 4 HOURS PRN
Qty: 20 TABLET | Refills: 0 | Status: SHIPPED | OUTPATIENT
Start: 2025-01-09 | End: 2025-01-14

## 2025-01-09 RX ORDER — CYCLOBENZAPRINE HCL 10 MG
10 TABLET ORAL 3 TIMES DAILY PRN
Qty: 21 TABLET | Refills: 0 | Status: SHIPPED | OUTPATIENT
Start: 2025-01-09 | End: 2025-01-16

## 2025-01-09 RX ORDER — CEPHALEXIN 500 MG/1
500 CAPSULE ORAL 4 TIMES DAILY
Qty: 12 CAPSULE | Refills: 0 | Status: SHIPPED | OUTPATIENT
Start: 2025-01-09 | End: 2025-01-12

## 2025-01-09 RX ORDER — NAPROXEN 500 MG/1
500 TABLET ORAL 2 TIMES DAILY WITH MEALS
Qty: 28 TABLET | Refills: 0 | Status: SHIPPED | OUTPATIENT
Start: 2025-01-09 | End: 2025-01-23

## 2025-01-09 RX ORDER — CIPROFLOXACIN 500 MG/1
500 TABLET, FILM COATED ORAL 2 TIMES DAILY
Qty: 6 TABLET | Refills: 0 | Status: SHIPPED | OUTPATIENT
Start: 2025-01-09 | End: 2025-01-12

## 2025-01-09 RX ORDER — SENNOSIDES 8.6 MG
1 TABLET ORAL 2 TIMES DAILY
Qty: 14 TABLET | Refills: 0 | Status: SHIPPED | OUTPATIENT
Start: 2025-01-09 | End: 2025-01-16

## 2025-01-09 RX ORDER — ASPIRIN 81 MG/1
81 TABLET ORAL 2 TIMES DAILY
Qty: 56 TABLET | Refills: 0 | Status: CANCELLED | OUTPATIENT
Start: 2025-01-09 | End: 2025-02-06

## 2025-01-09 NOTE — PROGRESS NOTES
Chief Complaint  Hip Pain (Preop left CECY)      History of Present Illness:  Scarlett Doran is a pleasant 74 y.o. female here for pre op visit, left CECY   On apixaban for afib at baseline. Pain assessment is below.     Medical History:  Patient's medications, allergies, past medical, surgical, social and family histories were reviewed and updated as appropriate.    Pain Assessment  Location of Pain: Hip  Location Modifiers: Left  Severity of Pain: 2  Quality of Pain: Dull, Aching  Frequency of Pain: Intermittent  Limiting Behavior: Yes  Relieving Factors: Rest  Result of Injury: No  Work-Related Injury: No  ROS: Review of systems reviewed from Patient History Form completed today and available in the patient's chart under the Media tab.      Pertinent items are noted in HPI  Review of systems reviewed from Patient History Form completed today and available in the patient's chart under the Media tab.       Vital Signs:  Ht 1.651 m (5' 5\")   Wt 67.1 kg (148 lb)   BMI 24.63 kg/m²         Neuro: Alert & oriented x 3,  normal,  no focal deficits noted. Normal affect.  Eyes: sclera clear  Ears: Normal external ear  Mouth:  No perioral lesions  Pulm: Respirations unlabored and regular  Pulse: Extremities well perfused. 2+ peripheral pulses.  Skin: Warm. No ulcerations.      Constitutional: The physical examination finds the patient to be well-developed and well-nourished.  The patient is alert and oriented x3 and was cooperative throughout the visit.    Hip Examination: left    Skin/Inspection: No skin lesions, cellulitis, or extreme edema in the lower extremities.     Standing/Walking: normal gait, negative Trendelenburg sign.      Supine/Side Lying Exam: Non tender around the major bony prominences  full range with pain  FADIR Positive  ELHAM Positive  Resisted Abduction  5/5   Resisted Adduction  5/5   Resisted  Flexion  5/5    not tender at greater trochanter    Distal Neurovascular exam is intact (foot

## 2025-01-10 ENCOUNTER — TELEPHONE (OUTPATIENT)
Dept: ORTHOPEDIC SURGERY | Age: 75
End: 2025-01-10

## 2025-01-10 RX ORDER — SODIUM CHLORIDE 9 MG/ML
INJECTION, SOLUTION INTRAVENOUS PRN
Status: CANCELLED | OUTPATIENT
Start: 2025-01-17

## 2025-01-10 RX ORDER — SODIUM CHLORIDE 0.9 % (FLUSH) 0.9 %
5-40 SYRINGE (ML) INJECTION PRN
Status: CANCELLED | OUTPATIENT
Start: 2025-01-17

## 2025-01-10 RX ORDER — OXYCODONE HCL 10 MG/1
10 TABLET, FILM COATED, EXTENDED RELEASE ORAL ONCE
Status: CANCELLED | OUTPATIENT
Start: 2025-01-17 | End: 2025-01-10

## 2025-01-10 RX ORDER — SODIUM CHLORIDE, SODIUM LACTATE, POTASSIUM CHLORIDE, CALCIUM CHLORIDE 600; 310; 30; 20 MG/100ML; MG/100ML; MG/100ML; MG/100ML
INJECTION, SOLUTION INTRAVENOUS CONTINUOUS
Status: CANCELLED | OUTPATIENT
Start: 2025-01-10

## 2025-01-10 RX ORDER — GABAPENTIN 300 MG/1
300 CAPSULE ORAL ONCE
Status: CANCELLED | OUTPATIENT
Start: 2025-01-17 | End: 2025-01-10

## 2025-01-10 RX ORDER — SODIUM CHLORIDE 0.9 % (FLUSH) 0.9 %
5-40 SYRINGE (ML) INJECTION EVERY 12 HOURS SCHEDULED
Status: CANCELLED | OUTPATIENT
Start: 2025-01-17

## 2025-01-10 RX ORDER — ACETAMINOPHEN 325 MG/1
1000 TABLET ORAL ONCE
Status: CANCELLED | OUTPATIENT
Start: 2025-01-17 | End: 2025-01-10

## 2025-01-10 RX ORDER — DEXAMETHASONE SODIUM PHOSPHATE 10 MG/ML
10 INJECTION, SOLUTION INTRAMUSCULAR; INTRAVENOUS ONCE
Status: CANCELLED | OUTPATIENT
Start: 2025-01-17 | End: 2025-01-10

## 2025-01-10 RX ORDER — KETOROLAC TROMETHAMINE 30 MG/ML
15 INJECTION, SOLUTION INTRAMUSCULAR; INTRAVENOUS ONCE
Status: CANCELLED | OUTPATIENT
Start: 2025-01-17 | End: 2025-01-10

## 2025-01-10 NOTE — TELEPHONE ENCOUNTER
Orthopedic Nurse Navigator Summary    Patient Name:  Scarlett Doran  Anticipated Date of Surgery:  01/17/25  Attended Pre-op Education Class:  Video sent to patient email 12/30/24  PCP: Modesto Alvares MD  Date of PCP visit for H&P: 12/19/24  Is patient in a Pain Management program:  Review of Medical history reveals history of: PAF, Atrial flutter, HTN, Asthma, GERD, Osteopenia lumbar spine, Laryngopharyngeal reflux, Fibromyalgia, Depression, Cervical DDD, Cervical stenosis, Difficult intubation    Critical Lab Values  - Hemoglobin (g/dL):  Date: 01/02/25 Value 13.8  - Hematocrit(%): Date: 01/02/25  Value 41.5  - HgbA1C:  Date: 01/02/25 Value 5.0  - Albumin:  Date: 01/02/25  Value 4.7  - BUN:  Date: 01/02/25  Value 9  - Creatinine:  Date: 01/02/25  Value 0.6    01/02/25 MRSA swab- negative    Coronary Artery Disease/HTN/CHF history: Yes  Does the patient see a Cardiologist: ANGELLA Schneider MD  Date of most recent cardiac appt: 10/29/24  On any anticoagulation:  Eliquis 5 mg BID- okay to hold for 2 days    Diabetes History:  No  Most recent HgbA1C: 5.0  Pulmonary:  COPD/Emphysema/Use of home oxygen: Patient has asthma  Alcohol use: Yes    BMI greater than 40 at time of scheduling:  BMI 24.63  Additional medical concerns:  Additional recommendations for above concerns:  Attended Pre-Hab program:    Anticipated Discharge Disposition:  Home with OPT  Who will be with patient at home following discharge:    Equipment patient already has:  none  Bedroom on first or second floor:  first  Bathroom on first or second floor:  first  Weight bearing status:  wbat  Pre-op ambulatory status: painful ambulation  Number of entry steps:  3 from the garage  Caregiver assistance:  full time    Carolina Lay RN  Date: 01/10/25

## 2025-01-13 RX ORDER — TRETINOIN 0.5 MG/G
CREAM TOPICAL NIGHTLY
COMMUNITY
Start: 2024-12-04

## 2025-01-13 RX ORDER — ZOLPIDEM TARTRATE 10 MG/1
10 TABLET ORAL NIGHTLY PRN
COMMUNITY
Start: 2025-01-09

## 2025-01-13 RX ORDER — METOPROLOL SUCCINATE 50 MG/1
50 TABLET, EXTENDED RELEASE ORAL NIGHTLY
COMMUNITY
Start: 2024-11-07

## 2025-01-13 RX ORDER — PREGABALIN 25 MG/1
25 CAPSULE ORAL 2 TIMES DAILY
COMMUNITY
End: 2025-01-13

## 2025-01-13 RX ORDER — DULOXETIN HYDROCHLORIDE 30 MG/1
60 CAPSULE, DELAYED RELEASE ORAL DAILY
COMMUNITY
Start: 2025-01-10

## 2025-01-13 RX ORDER — ALBUTEROL SULFATE 90 UG/1
1 INHALANT RESPIRATORY (INHALATION) 4 TIMES DAILY
COMMUNITY
Start: 2025-01-10

## 2025-01-13 NOTE — PROGRESS NOTES
1/13/2025 1225 PM:        [facility] PRE-SURGICAL TESTING INSTRUCTIONS                      PRIOR TO PROCEDURE DATE:    1. PLEASE FOLLOW ANY INSTRUCTIONS GIVEN TO YOU PER YOUR SURGEON.      2. Arrange for someone to drive you home and be with you for the first 24 hours after discharge for your safety after your procedure for which you received sedation. Ensure it is someone we can share information with regarding your discharge.     NOTE: At this time ONLY 2 ADULTS may accompany you. NO CHILDREN UNDER AGE OF 16.    One person ENCOURAGED to stay at hospital entire time if outpatient surgery      3. You must contact your surgeon for instructions IF:  You are taking any blood thinners, aspirin, anti-inflammatory or vitamins.   Contact your ordering physician/surgeon for prescription medication instructions as soon as possible, especially if taking blood thinners, aspirin, heart, or diabetic medication.   There is a change in your physical condition such as a cold, fever, rash, cuts, sores, or any other infection, especially near your surgical site.    4. Do not drink alcohol the day before or day of your procedure.  Do not use any recreational marijuana at least 24 hours or street drugs (heroin, cocaine) at minimum 5 days prior to your procedure.     5. A Pre-Surgical History and Physical MUST be completed WITHIN 30 DAYS OR LESS prior to your procedure.by your Physician or an Urgent Care        THE DAY OF YOUR PROCEDURE:  1.  Follow instructions for ARRIVAL TIME as DIRECTED BY YOUR SURGEON. [place] [phone]     2. Enter the MAIN entrance from [facility] and follow the signs to the free Parking Garage or  Parking (offered free of charge 7 am-5pm).      3. Enter the Main Entrance of the hospital (do not enter from the lower level of the parking garage). Upon entrance, check in with the  at the surgical information desk on your LEFT.   Bring your

## 2025-01-13 NOTE — PROGRESS NOTES
1/13/2025 1229 PM:    [facility] Same Day Readiness Screen 2.0  PAT Questionnaire    Does patient have at least one day of 24 hr assist of capable caregiver at d/c?  [x] Yes = 0  [] No = 6    Was patient using an assistive device to walk prior to surgery?  [x] No = 0  [] Yes = 4    How many steps do you have to get to the floor where you plan to initially sleep and use the restroom? (At least 1/2 bath)  [x] 0-2 steps= 0 [] 3+ steps = 1    Has patient fallen in the last 3 months? If yes, how many times?  [x] 0 falls = 0 [] 1+ fall = 1    [] 2+ falls = 4    Does patient have a hx of post-op nausea/vomiting?  [x] No = 0 [] Yes = 2

## 2025-01-13 NOTE — PROGRESS NOTES
1/13/2025 0804 am:  PRESURGICAL BATHING INSTRUCTIONS  The [facility] takes many steps to prevent infections during surgery. One way is to provide   you with 4% Chlorhexidine Gluconate (CHG), a special antiseptic soap to wash your skin prior to   surgery. By thoroughly washing your skin, you can reduce the number of germs and help us   prevent an infection. Skin prep is a very important part of getting you ready for surgery. Please   follow the instructions listed below. Do NOT use if you have had an allergic reaction to CHG   previously.   Common Brand names:  Betasept, Hibiclens, Hibistat, Exidine, BioScrub, Savanna-Hex, Peridex, Clorostat      Showering Steps 5 Days Before Your Procedure:  Wash your hair, face and body using your regular soap and shampoo.    Rinse your hair and body thoroughly to remove any soap or shampoo residue.  Turn the water off to prevent rinsing the antiseptic soap (CHG) off too soon.    Wash the body gently for 5 minutes using a clean washcloth wet down with the CHG soap on it.    Apply the Chlorhexidine Gluconate (CHG) soap to your entire body only from the neck down.   Do not use on your face, eyes, ears, hair or genital area to avoid permanent injury to those areas.  Do not scrub the skin too hard. Wash thoroughly paying special attention to the area   where the surgery or procedure will be done.   Do not wash with regular soap once CHG is used.   Turn the water back on and rinse the body off thoroughly.  Pat yourself dry with a clean fresh towel after each shower for 5 days.   Put on clean clothes after each shower for the 5 days.  Do not put on lotions or powders after bathing.    If any kind of rash appears, stop use and contact your surgeon    A bottle of Chlorhexidine Gluconate CHG) can be purchased at most local pharmacy chain stores.   The soap may come in a liquid form, wipes or scrub brush applicator.  Any form is fine.  Remember   to use for 5 days prior to your surgery.

## 2025-01-13 NOTE — PROGRESS NOTES
1/13/2025 1106 AM:    LMOR REQUESTING PT TO RETURN CALL TO [facility] PAT/TS    H&P AND EKG TRACING IN MEDIA 12/20/24 AND LABS IN EPIC 1/2/2025-URINE RESULT AND CMP ROUTED TO DR SANTIZO/TS     1/13/2025 1224 PM:    PT RETURNED CALL AND TI COMPLETED/TS

## 2025-01-13 NOTE — PROGRESS NOTES
1/13/2025 0804 am:    TJ book, IS instructions, TJ video link, and fall contract placed on chart for DOS/ts.      1/13/2025 1226 PM:    [facility] video emailed FROM OFFICE  & reviewed to patient by THIS NURSE. DENIES ANY QUESTIONS. Hibiclens instructions reviewed to use x 5 days preop.  NANCY screening done./TS

## 2025-01-16 ENCOUNTER — ANESTHESIA EVENT (OUTPATIENT)
Dept: OPERATING ROOM | Age: 75
End: 2025-01-16
Payer: MEDICARE

## 2025-01-16 NOTE — DISCHARGE INSTRUCTIONS
Dr. Natalia Delgado MD [facility]  Hip Preservation & Sports Medicine Surgeon   [facility]          POST-OPERATIVE INSTRUCTIONS:     Apply ice (over your dressing) for 4-6 weeks after surgery to help reduce swelling.    This can be applied to the affected area 20 minutes out of every hour while you are awake.     Leave your waterproof dressing in place for 7 days. After 7 days you may change to a new waterproof dressing that will be provided for you    Please take all of your post-operative medications as prescribed.  Please do not alter how you take these medications without contacting the physician.  If you have any questions and/or concerns about your post-operative medications, please call our office.    Please do not take any additional Tylenol while you are taking the Norco.  This medication already contains Tylenol and taking additional Tylenol may cause liver damage.    It is okay to use Tylenol once you are no longer taking the Norco.    It is common to feel drowsy while taking pain medication.  Do not drink alcohol or drive while taking pain medication.    Nausea is also a common side effect of using pain medication.  If this occurs, try taking your medication with food.  Also drink plenty of water while taking the pain medication. You may use an over the counter anti-nausea as needed.  If nausea becomes severe, please contact the office and a prescription for Zofran may be prescribed.    To optimize your pain control, you can take your pain medication as prescribed for 2 days, then gradually taper off over the next day as tolerable.  If you feel your pain is not well controlled or begins to worsen following your first post-operative visit, please contact our office.   You will also need take a daily aspirin.  This will help prevent blood clots.       Please keep your dressings/wounds dry at all times.  Do not swim in pools, lakes, etc. until instructed to do so by your physician that it is  severe or prolonged shortness of breath- GO to the nearest Emergency Room  If you had a nerve block of your arm or leg:   Protect the arm or leg from extreme hot or cold temperatures   Protect the arm or leg from obstructing blood flow by frequent position changes- Make sure fingers/ toes stay pink and warm. Call surgeon with any changes   For leg block, get assistance walking until the block wears off, i.e.:  crutches, walker, support person    If you continue to feel the effects of the nerve block for longer than 72 hours- [facility] [phone] and ask to speak with the Anesthesiologist on call.      [facility] AMBULATORY PROCEDURE DISCHARGE INSTRUCTIONS    There are potential side effects of anesthesia or sedation you may experience for the first 24 hours.  These side effects include:    Confusion or Memory loss, Dizziness, or Delayed Reaction Times   [x]A responsible person should be with you for the next 24 hours.  Do not operate any vehicles (automobiles, bicycles, motorcycles) or power tools or machinery for 24 hours.  Do not sign any legal documents or make any legal decisions for 24 hours. Do not drink alcohol for 24 hours or while taking narcotic pain medication.      Nausea    [x]Start with light diet and progress to your normal diet as you feel like eating. However, if you experience nausea or repeated episodes of vomiting which persist beyond 12-24 hours, notify your physician.  Once nausea has passed, remember to keep drinking fluids.    Difficulty Passing Urine  [x]Drink extra amounts of fluid today.  Notify your physician if you have not urinated within 8 hours after your procedure or you feel uncomfortable.      Irritated Throat from a Breathing Tube  [x]Drink extra amounts of fluid today.  Lozenges may help.    Muscle Aches  [x]You may experience some generalized body aches as your muscles recover from medications used to relax them during surgery.  These will gradually

## 2025-01-17 ENCOUNTER — APPOINTMENT (OUTPATIENT)
Dept: GENERAL RADIOLOGY | Age: 75
End: 2025-01-17
Attending: ORTHOPAEDIC SURGERY
Payer: MEDICARE

## 2025-01-17 ENCOUNTER — ANESTHESIA (OUTPATIENT)
Dept: OPERATING ROOM | Age: 75
End: 2025-01-17
Payer: MEDICARE

## 2025-01-17 ENCOUNTER — HOSPITAL ENCOUNTER (OUTPATIENT)
Age: 75
Setting detail: OBSERVATION
Discharge: HOME | End: 2025-01-18
Attending: ORTHOPAEDIC SURGERY | Admitting: ORTHOPAEDIC SURGERY
Payer: MEDICARE

## 2025-01-17 PROBLEM — M16.10 HIP ARTHRITIS: Status: ACTIVE | Noted: 2025-01-17

## 2025-01-17 PROBLEM — Z96.642 STATUS POST TOTAL HIP REPLACEMENT, LEFT: Status: ACTIVE | Noted: 2025-01-17

## 2025-01-17 LAB
ABO + RH BLD: NORMAL
ANION GAP SERPL CALCULATED.3IONS-SCNC: 7 MMOL/L (ref 3–16)
BLD GP AB SCN SERPL QL: NORMAL
BUN SERPL-MCNC: 8 MG/DL (ref 7–20)
CALCIUM SERPL-MCNC: 8.9 MG/DL (ref 8.3–10.6)
CHLORIDE SERPL-SCNC: 102 MMOL/L (ref 99–110)
CO2 SERPL-SCNC: 26 MMOL/L (ref 21–32)
CREAT SERPL-MCNC: 0.6 MG/DL (ref 0.6–1.2)
GFR SERPLBLD CREATININE-BSD FMLA CKD-EPI: >90 ML/MIN/{1.73_M2}
GLUCOSE BLD-MCNC: 107 MG/DL (ref 70–99)
GLUCOSE BLD-MCNC: 127 MG/DL (ref 70–99)
GLUCOSE SERPL-MCNC: 126 MG/DL (ref 70–99)
HCT VFR BLD AUTO: 35.6 % (ref 36–48)
HGB BLD-MCNC: 11.8 G/DL (ref 12–16)
PERFORMED ON: ABNORMAL
PERFORMED ON: ABNORMAL
POTASSIUM SERPL-SCNC: 4.4 MMOL/L (ref 3.5–5.1)
SODIUM SERPL-SCNC: 135 MMOL/L (ref 136–145)

## 2025-01-17 PROCEDURE — 64447 NJX AA&/STRD FEMORAL NRV IMG: CPT | Performed by: ANESTHESIOLOGY

## 2025-01-17 PROCEDURE — 97166 OT EVAL MOD COMPLEX 45 MIN: CPT

## 2025-01-17 PROCEDURE — 3700000001 HC ADD 15 MINUTES (ANESTHESIA): Performed by: ORTHOPAEDIC SURGERY

## 2025-01-17 PROCEDURE — 2580000003 HC RX 258: Performed by: ORTHOPAEDIC SURGERY

## 2025-01-17 PROCEDURE — 97530 THERAPEUTIC ACTIVITIES: CPT

## 2025-01-17 PROCEDURE — 6370000000 HC RX 637 (ALT 250 FOR IP)

## 2025-01-17 PROCEDURE — 86850 RBC ANTIBODY SCREEN: CPT

## 2025-01-17 PROCEDURE — 2500000003 HC RX 250 WO HCPCS

## 2025-01-17 PROCEDURE — 80048 BASIC METABOLIC PNL TOTAL CA: CPT

## 2025-01-17 PROCEDURE — C1776 JOINT DEVICE (IMPLANTABLE): HCPCS | Performed by: ORTHOPAEDIC SURGERY

## 2025-01-17 PROCEDURE — 73501 X-RAY EXAM HIP UNI 1 VIEW: CPT

## 2025-01-17 PROCEDURE — 6360000002 HC RX W HCPCS: Performed by: ORTHOPAEDIC SURGERY

## 2025-01-17 PROCEDURE — 6360000002 HC RX W HCPCS: Performed by: ANESTHESIOLOGY

## 2025-01-17 PROCEDURE — 7100000001 HC PACU RECOVERY - ADDTL 15 MIN: Performed by: ORTHOPAEDIC SURGERY

## 2025-01-17 PROCEDURE — 86901 BLOOD TYPING SEROLOGIC RH(D): CPT

## 2025-01-17 PROCEDURE — 85014 HEMATOCRIT: CPT

## 2025-01-17 PROCEDURE — 2720000010 HC SURG SUPPLY STERILE: Performed by: ORTHOPAEDIC SURGERY

## 2025-01-17 PROCEDURE — G0378 HOSPITAL OBSERVATION PER HR: HCPCS

## 2025-01-17 PROCEDURE — 2500000003 HC RX 250 WO HCPCS: Performed by: ORTHOPAEDIC SURGERY

## 2025-01-17 PROCEDURE — 97161 PT EVAL LOW COMPLEX 20 MIN: CPT

## 2025-01-17 PROCEDURE — 6370000000 HC RX 637 (ALT 250 FOR IP): Performed by: ORTHOPAEDIC SURGERY

## 2025-01-17 PROCEDURE — 85018 HEMOGLOBIN: CPT

## 2025-01-17 PROCEDURE — 7100000000 HC PACU RECOVERY - FIRST 15 MIN: Performed by: ORTHOPAEDIC SURGERY

## 2025-01-17 PROCEDURE — 3600000004 HC SURGERY LEVEL 4 BASE: Performed by: ORTHOPAEDIC SURGERY

## 2025-01-17 PROCEDURE — 73502 X-RAY EXAM HIP UNI 2-3 VIEWS: CPT

## 2025-01-17 PROCEDURE — 6360000002 HC RX W HCPCS

## 2025-01-17 PROCEDURE — 2709999900 HC NON-CHARGEABLE SUPPLY: Performed by: ORTHOPAEDIC SURGERY

## 2025-01-17 PROCEDURE — 3600000014 HC SURGERY LEVEL 4 ADDTL 15MIN: Performed by: ORTHOPAEDIC SURGERY

## 2025-01-17 PROCEDURE — 86900 BLOOD TYPING SEROLOGIC ABO: CPT

## 2025-01-17 PROCEDURE — 2580000003 HC RX 258

## 2025-01-17 PROCEDURE — 3700000000 HC ANESTHESIA ATTENDED CARE: Performed by: ORTHOPAEDIC SURGERY

## 2025-01-17 DEVICE — HEAD FEM DIA32MM +4MM OFFSET BIOLOX DELT CERAMIC V40 TAPR: Type: IMPLANTABLE DEVICE | Site: HIP | Status: FUNCTIONAL

## 2025-01-17 DEVICE — INSERT ACET C 0 DEG 32 MM HIP X3 TRIDENT 7230032C: Type: IMPLANTABLE DEVICE | Site: HIP | Status: FUNCTIONAL

## 2025-01-17 DEVICE — SHELL ACET SZ C DIA46MM 3 CLUS H TRITANIUM PRESSFIT PRI: Type: IMPLANTABLE DEVICE | Site: HIP | Status: FUNCTIONAL

## 2025-01-17 DEVICE — COMPONENT TOT HIP CAPPED LNR POLYETH H2STRYKER] STRYKER CORP]: Type: IMPLANTABLE DEVICE | Site: HIP | Status: FUNCTIONAL

## 2025-01-17 DEVICE — SCREW BNE L25MM DIA6.5MM HEX LO PROF TRIDENT II: Type: IMPLANTABLE DEVICE | Site: HIP | Status: FUNCTIONAL

## 2025-01-17 DEVICE — STEM FEM HI OFFSET 4 HIP CLLRD INSIGNIA: Type: IMPLANTABLE DEVICE | Site: HIP | Status: FUNCTIONAL

## 2025-01-17 RX ORDER — ACETAMINOPHEN 325 MG/1
650 TABLET ORAL EVERY 6 HOURS
Status: DISCONTINUED | OUTPATIENT
Start: 2025-01-17 | End: 2025-01-18 | Stop reason: HOSPADM

## 2025-01-17 RX ORDER — LIDOCAINE HYDROCHLORIDE 10 MG/ML
1 INJECTION, SOLUTION EPIDURAL; INFILTRATION; INTRACAUDAL; PERINEURAL
Status: DISCONTINUED | OUTPATIENT
Start: 2025-01-17 | End: 2025-01-17 | Stop reason: HOSPADM

## 2025-01-17 RX ORDER — MAGNESIUM HYDROXIDE 1200 MG/15ML
LIQUID ORAL CONTINUOUS PRN
Status: DISCONTINUED | OUTPATIENT
Start: 2025-01-17 | End: 2025-01-17 | Stop reason: HOSPADM

## 2025-01-17 RX ORDER — ACETAMINOPHEN 325 MG/1
1000 TABLET ORAL ONCE
Status: COMPLETED | OUTPATIENT
Start: 2025-01-17 | End: 2025-01-17

## 2025-01-17 RX ORDER — OXYCODONE HYDROCHLORIDE 5 MG/1
10 TABLET ORAL EVERY 4 HOURS PRN
Status: DISCONTINUED | OUTPATIENT
Start: 2025-01-17 | End: 2025-01-18 | Stop reason: HOSPADM

## 2025-01-17 RX ORDER — HYDROMORPHONE HYDROCHLORIDE 2 MG/ML
INJECTION, SOLUTION INTRAMUSCULAR; INTRAVENOUS; SUBCUTANEOUS
Status: DISCONTINUED | OUTPATIENT
Start: 2025-01-17 | End: 2025-01-17 | Stop reason: SDUPTHER

## 2025-01-17 RX ORDER — HYDROMORPHONE HYDROCHLORIDE 1 MG/ML
0.5 INJECTION, SOLUTION INTRAMUSCULAR; INTRAVENOUS; SUBCUTANEOUS EVERY 5 MIN PRN
Status: DISCONTINUED | OUTPATIENT
Start: 2025-01-17 | End: 2025-01-17 | Stop reason: HOSPADM

## 2025-01-17 RX ORDER — ROCURONIUM BROMIDE 10 MG/ML
INJECTION, SOLUTION INTRAVENOUS
Status: DISCONTINUED | OUTPATIENT
Start: 2025-01-17 | End: 2025-01-17 | Stop reason: SDUPTHER

## 2025-01-17 RX ORDER — HYDROMORPHONE HYDROCHLORIDE 1 MG/ML
0.25 INJECTION, SOLUTION INTRAMUSCULAR; INTRAVENOUS; SUBCUTANEOUS
Status: DISCONTINUED | OUTPATIENT
Start: 2025-01-17 | End: 2025-01-18 | Stop reason: HOSPADM

## 2025-01-17 RX ORDER — ACETAMINOPHEN 325 MG/1
650 TABLET ORAL
Status: DISCONTINUED | OUTPATIENT
Start: 2025-01-17 | End: 2025-01-17 | Stop reason: HOSPADM

## 2025-01-17 RX ORDER — LEVOCETIRIZINE DIHYDROCHLORIDE 5 MG/1
5 TABLET, FILM COATED ORAL NIGHTLY
COMMUNITY

## 2025-01-17 RX ORDER — DEXAMETHASONE SODIUM PHOSPHATE 10 MG/ML
10 INJECTION, SOLUTION INTRAMUSCULAR; INTRAVENOUS ONCE
Status: COMPLETED | OUTPATIENT
Start: 2025-01-17 | End: 2025-01-17

## 2025-01-17 RX ORDER — SODIUM CHLORIDE 9 MG/ML
INJECTION, SOLUTION INTRAVENOUS PRN
Status: DISCONTINUED | OUTPATIENT
Start: 2025-01-17 | End: 2025-01-18 | Stop reason: HOSPADM

## 2025-01-17 RX ORDER — SODIUM CHLORIDE 0.9 % (FLUSH) 0.9 %
5-40 SYRINGE (ML) INJECTION EVERY 12 HOURS SCHEDULED
Status: DISCONTINUED | OUTPATIENT
Start: 2025-01-17 | End: 2025-01-18 | Stop reason: HOSPADM

## 2025-01-17 RX ORDER — SODIUM CHLORIDE 0.9 % (FLUSH) 0.9 %
5-40 SYRINGE (ML) INJECTION EVERY 12 HOURS SCHEDULED
Status: DISCONTINUED | OUTPATIENT
Start: 2025-01-17 | End: 2025-01-17 | Stop reason: HOSPADM

## 2025-01-17 RX ORDER — FENTANYL CITRATE 50 UG/ML
25 INJECTION, SOLUTION INTRAMUSCULAR; INTRAVENOUS EVERY 5 MIN PRN
Status: DISCONTINUED | OUTPATIENT
Start: 2025-01-17 | End: 2025-01-17 | Stop reason: HOSPADM

## 2025-01-17 RX ORDER — SODIUM CHLORIDE 9 MG/ML
INJECTION, SOLUTION INTRAVENOUS PRN
Status: DISCONTINUED | OUTPATIENT
Start: 2025-01-17 | End: 2025-01-17 | Stop reason: HOSPADM

## 2025-01-17 RX ORDER — NALOXONE HYDROCHLORIDE 0.4 MG/ML
INJECTION, SOLUTION INTRAMUSCULAR; INTRAVENOUS; SUBCUTANEOUS PRN
Status: DISCONTINUED | OUTPATIENT
Start: 2025-01-17 | End: 2025-01-17 | Stop reason: HOSPADM

## 2025-01-17 RX ORDER — SODIUM CHLORIDE, SODIUM LACTATE, POTASSIUM CHLORIDE, CALCIUM CHLORIDE 600; 310; 30; 20 MG/100ML; MG/100ML; MG/100ML; MG/100ML
INJECTION, SOLUTION INTRAVENOUS CONTINUOUS
Status: DISCONTINUED | OUTPATIENT
Start: 2025-01-17 | End: 2025-01-17 | Stop reason: HOSPADM

## 2025-01-17 RX ORDER — LABETALOL HYDROCHLORIDE 5 MG/ML
10 INJECTION, SOLUTION INTRAVENOUS
Status: DISCONTINUED | OUTPATIENT
Start: 2025-01-17 | End: 2025-01-17 | Stop reason: HOSPADM

## 2025-01-17 RX ORDER — ONDANSETRON 2 MG/ML
4 INJECTION INTRAMUSCULAR; INTRAVENOUS
Status: DISCONTINUED | OUTPATIENT
Start: 2025-01-17 | End: 2025-01-17 | Stop reason: HOSPADM

## 2025-01-17 RX ORDER — LIDOCAINE HYDROCHLORIDE 20 MG/ML
INJECTION, SOLUTION INTRAVENOUS
Status: DISCONTINUED | OUTPATIENT
Start: 2025-01-17 | End: 2025-01-17 | Stop reason: SDUPTHER

## 2025-01-17 RX ORDER — SODIUM CHLORIDE 0.9 % (FLUSH) 0.9 %
5-40 SYRINGE (ML) INJECTION PRN
Status: DISCONTINUED | OUTPATIENT
Start: 2025-01-17 | End: 2025-01-17 | Stop reason: HOSPADM

## 2025-01-17 RX ORDER — ONDANSETRON 4 MG/1
4 TABLET, ORALLY DISINTEGRATING ORAL EVERY 8 HOURS PRN
Status: DISCONTINUED | OUTPATIENT
Start: 2025-01-17 | End: 2025-01-18 | Stop reason: HOSPADM

## 2025-01-17 RX ORDER — SODIUM CHLORIDE 0.9 % (FLUSH) 0.9 %
5-40 SYRINGE (ML) INJECTION PRN
Status: DISCONTINUED | OUTPATIENT
Start: 2025-01-17 | End: 2025-01-18 | Stop reason: HOSPADM

## 2025-01-17 RX ORDER — PHENYLEPHRINE HYDROCHLORIDE 10 MG/ML
INJECTION INTRAVENOUS
Status: DISCONTINUED | OUTPATIENT
Start: 2025-01-17 | End: 2025-01-17 | Stop reason: SDUPTHER

## 2025-01-17 RX ORDER — NAPROXEN 250 MG/1
500 TABLET ORAL 2 TIMES DAILY WITH MEALS
Status: DISCONTINUED | OUTPATIENT
Start: 2025-01-17 | End: 2025-01-18 | Stop reason: HOSPADM

## 2025-01-17 RX ORDER — ROPIVACAINE HYDROCHLORIDE 5 MG/ML
INJECTION, SOLUTION EPIDURAL; INFILTRATION; PERINEURAL
Status: COMPLETED
Start: 2025-01-17 | End: 2025-01-17

## 2025-01-17 RX ORDER — GLYCOPYRROLATE 0.2 MG/ML
INJECTION INTRAMUSCULAR; INTRAVENOUS
Status: DISCONTINUED | OUTPATIENT
Start: 2025-01-17 | End: 2025-01-17 | Stop reason: SDUPTHER

## 2025-01-17 RX ORDER — ROPIVACAINE HYDROCHLORIDE 5 MG/ML
INJECTION, SOLUTION EPIDURAL; INFILTRATION; PERINEURAL
Status: COMPLETED | OUTPATIENT
Start: 2025-01-17 | End: 2025-01-17

## 2025-01-17 RX ORDER — ONDANSETRON 2 MG/ML
INJECTION INTRAMUSCULAR; INTRAVENOUS
Status: DISCONTINUED | OUTPATIENT
Start: 2025-01-17 | End: 2025-01-17 | Stop reason: SDUPTHER

## 2025-01-17 RX ORDER — HYDROMORPHONE HYDROCHLORIDE 1 MG/ML
0.5 INJECTION, SOLUTION INTRAMUSCULAR; INTRAVENOUS; SUBCUTANEOUS
Status: DISCONTINUED | OUTPATIENT
Start: 2025-01-17 | End: 2025-01-18 | Stop reason: HOSPADM

## 2025-01-17 RX ORDER — PROCHLORPERAZINE EDISYLATE 5 MG/ML
5 INJECTION INTRAMUSCULAR; INTRAVENOUS
Status: DISCONTINUED | OUTPATIENT
Start: 2025-01-17 | End: 2025-01-17 | Stop reason: HOSPADM

## 2025-01-17 RX ORDER — IPRATROPIUM BROMIDE AND ALBUTEROL SULFATE 2.5; .5 MG/3ML; MG/3ML
1 SOLUTION RESPIRATORY (INHALATION)
Status: DISCONTINUED | OUTPATIENT
Start: 2025-01-17 | End: 2025-01-17 | Stop reason: HOSPADM

## 2025-01-17 RX ORDER — OXYCODONE HCL 10 MG/1
10 TABLET, FILM COATED, EXTENDED RELEASE ORAL ONCE
Status: COMPLETED | OUTPATIENT
Start: 2025-01-17 | End: 2025-01-17

## 2025-01-17 RX ORDER — GABAPENTIN 300 MG/1
300 CAPSULE ORAL ONCE
Status: COMPLETED | OUTPATIENT
Start: 2025-01-17 | End: 2025-01-17

## 2025-01-17 RX ORDER — MIDAZOLAM HYDROCHLORIDE 1 MG/ML
INJECTION, SOLUTION INTRAMUSCULAR; INTRAVENOUS
Status: COMPLETED | OUTPATIENT
Start: 2025-01-17 | End: 2025-01-17

## 2025-01-17 RX ORDER — FENTANYL CITRATE 50 UG/ML
INJECTION, SOLUTION INTRAMUSCULAR; INTRAVENOUS
Status: COMPLETED | OUTPATIENT
Start: 2025-01-17 | End: 2025-01-17

## 2025-01-17 RX ORDER — CYCLOBENZAPRINE HCL 10 MG
10 TABLET ORAL EVERY 12 HOURS PRN
Status: DISCONTINUED | OUTPATIENT
Start: 2025-01-17 | End: 2025-01-18 | Stop reason: HOSPADM

## 2025-01-17 RX ORDER — OXYCODONE HYDROCHLORIDE 5 MG/1
5 TABLET ORAL EVERY 4 HOURS PRN
Status: DISCONTINUED | OUTPATIENT
Start: 2025-01-17 | End: 2025-01-18 | Stop reason: HOSPADM

## 2025-01-17 RX ORDER — SENNA AND DOCUSATE SODIUM 50; 8.6 MG/1; MG/1
1 TABLET, FILM COATED ORAL 2 TIMES DAILY
Status: DISCONTINUED | OUTPATIENT
Start: 2025-01-17 | End: 2025-01-18 | Stop reason: HOSPADM

## 2025-01-17 RX ORDER — KETOROLAC TROMETHAMINE 30 MG/ML
15 INJECTION, SOLUTION INTRAMUSCULAR; INTRAVENOUS ONCE
Status: COMPLETED | OUTPATIENT
Start: 2025-01-17 | End: 2025-01-17

## 2025-01-17 RX ORDER — SODIUM CHLORIDE 9 MG/ML
INJECTION, SOLUTION INTRAVENOUS CONTINUOUS
Status: DISCONTINUED | OUTPATIENT
Start: 2025-01-17 | End: 2025-01-18 | Stop reason: HOSPADM

## 2025-01-17 RX ORDER — ONDANSETRON 2 MG/ML
4 INJECTION INTRAMUSCULAR; INTRAVENOUS EVERY 6 HOURS PRN
Status: DISCONTINUED | OUTPATIENT
Start: 2025-01-17 | End: 2025-01-18 | Stop reason: HOSPADM

## 2025-01-17 RX ORDER — PROPOFOL 10 MG/ML
INJECTION, EMULSION INTRAVENOUS
Status: DISCONTINUED | OUTPATIENT
Start: 2025-01-17 | End: 2025-01-17 | Stop reason: SDUPTHER

## 2025-01-17 RX ADMIN — WATER 2000 MG: 1 INJECTION INTRAMUSCULAR; INTRAVENOUS; SUBCUTANEOUS at 20:36

## 2025-01-17 RX ADMIN — GABAPENTIN 300 MG: 300 CAPSULE ORAL at 10:20

## 2025-01-17 RX ADMIN — SUGAMMADEX 200 MG: 100 INJECTION, SOLUTION INTRAVENOUS at 13:39

## 2025-01-17 RX ADMIN — PHENYLEPHRINE HYDROCHLORIDE 100 MCG: 10 INJECTION, SOLUTION INTRAVENOUS at 12:54

## 2025-01-17 RX ADMIN — ROPIVACAINE HYDROCHLORIDE 30 ML: 5 INJECTION, SOLUTION EPIDURAL; INFILTRATION; PERINEURAL at 10:30

## 2025-01-17 RX ADMIN — HYDROMORPHONE HYDROCHLORIDE 0.5 MG: 2 INJECTION, SOLUTION INTRAMUSCULAR; INTRAVENOUS; SUBCUTANEOUS at 12:22

## 2025-01-17 RX ADMIN — HYDROMORPHONE HYDROCHLORIDE 0.5 MG: 2 INJECTION, SOLUTION INTRAMUSCULAR; INTRAVENOUS; SUBCUTANEOUS at 13:50

## 2025-01-17 RX ADMIN — WATER 2000 MG: 1 INJECTION INTRAMUSCULAR; INTRAVENOUS; SUBCUTANEOUS at 11:47

## 2025-01-17 RX ADMIN — MIDAZOLAM HYDROCHLORIDE 2 MG: 1 INJECTION, SOLUTION INTRAMUSCULAR; INTRAVENOUS at 10:30

## 2025-01-17 RX ADMIN — PHENYLEPHRINE HYDROCHLORIDE 50 MCG: 10 INJECTION, SOLUTION INTRAVENOUS at 13:23

## 2025-01-17 RX ADMIN — ROCURONIUM BROMIDE 50 MG: 10 INJECTION, SOLUTION INTRAVENOUS at 12:19

## 2025-01-17 RX ADMIN — ACETAMINOPHEN 650 MG: 325 TABLET ORAL at 20:35

## 2025-01-17 RX ADMIN — GLYCOPYRROLATE 0.2 MG: 0.2 INJECTION INTRAMUSCULAR; INTRAVENOUS at 12:48

## 2025-01-17 RX ADMIN — HYDROMORPHONE HYDROCHLORIDE 0.5 MG: 1 INJECTION, SOLUTION INTRAMUSCULAR; INTRAVENOUS; SUBCUTANEOUS at 14:21

## 2025-01-17 RX ADMIN — ROCURONIUM BROMIDE 50 MG: 10 INJECTION, SOLUTION INTRAVENOUS at 11:42

## 2025-01-17 RX ADMIN — WATER 2000 MG: 1 INJECTION INTRAMUSCULAR; INTRAVENOUS; SUBCUTANEOUS at 15:27

## 2025-01-17 RX ADMIN — NAPROXEN 500 MG: 250 TABLET ORAL at 21:13

## 2025-01-17 RX ADMIN — FENTANYL CITRATE 100 MCG: 50 INJECTION, SOLUTION INTRAMUSCULAR; INTRAVENOUS at 10:30

## 2025-01-17 RX ADMIN — LIDOCAINE HYDROCHLORIDE 80 MG: 20 INJECTION, SOLUTION INTRAVENOUS at 11:42

## 2025-01-17 RX ADMIN — SENNOSIDES AND DOCUSATE SODIUM 1 TABLET: 50; 8.6 TABLET ORAL at 20:36

## 2025-01-17 RX ADMIN — SODIUM CHLORIDE: 9 INJECTION, SOLUTION INTRAVENOUS at 20:34

## 2025-01-17 RX ADMIN — DEXAMETHASONE SODIUM PHOSPHATE 10 MG: 10 INJECTION, SOLUTION INTRAMUSCULAR; INTRAVENOUS at 10:19

## 2025-01-17 RX ADMIN — ACETAMINOPHEN 975 MG: 325 TABLET ORAL at 10:20

## 2025-01-17 RX ADMIN — KETOROLAC TROMETHAMINE 15 MG: 30 INJECTION, SOLUTION INTRAMUSCULAR at 10:19

## 2025-01-17 RX ADMIN — TRANEXAMIC ACID 1000 MG: 1 INJECTION, SOLUTION INTRAVENOUS at 11:47

## 2025-01-17 RX ADMIN — SODIUM CHLORIDE, POTASSIUM CHLORIDE, SODIUM LACTATE AND CALCIUM CHLORIDE: 600; 310; 30; 20 INJECTION, SOLUTION INTRAVENOUS at 12:45

## 2025-01-17 RX ADMIN — OXYCODONE HYDROCHLORIDE 10 MG: 10 TABLET, FILM COATED, EXTENDED RELEASE ORAL at 10:20

## 2025-01-17 RX ADMIN — ROCURONIUM BROMIDE 20 MG: 10 INJECTION, SOLUTION INTRAVENOUS at 13:05

## 2025-01-17 RX ADMIN — ONDANSETRON 4 MG: 2 INJECTION INTRAMUSCULAR; INTRAVENOUS at 13:15

## 2025-01-17 RX ADMIN — PROPOFOL 150 MG: 10 INJECTION, EMULSION INTRAVENOUS at 11:42

## 2025-01-17 RX ADMIN — SODIUM CHLORIDE, POTASSIUM CHLORIDE, SODIUM LACTATE AND CALCIUM CHLORIDE: 600; 310; 30; 20 INJECTION, SOLUTION INTRAVENOUS at 10:18

## 2025-01-17 ASSESSMENT — PAIN DESCRIPTION - DESCRIPTORS
DESCRIPTORS: ACHING
DESCRIPTORS: ACHING

## 2025-01-17 ASSESSMENT — PAIN SCALES - GENERAL
PAINLEVEL_OUTOF10: 7
PAINLEVEL_OUTOF10: 5
PAINLEVEL_OUTOF10: 3
PAINLEVEL_OUTOF10: 3
PAINLEVEL_OUTOF10: 5
PAINLEVEL_OUTOF10: 3
PAINLEVEL_OUTOF10: 3

## 2025-01-17 ASSESSMENT — PAIN DESCRIPTION - PAIN TYPE
TYPE: SURGICAL PAIN
TYPE: SURGICAL PAIN

## 2025-01-17 ASSESSMENT — PAIN - FUNCTIONAL ASSESSMENT
PAIN_FUNCTIONAL_ASSESSMENT: ACTIVITIES ARE NOT PREVENTED
PAIN_FUNCTIONAL_ASSESSMENT: ACTIVITIES ARE NOT PREVENTED
PAIN_FUNCTIONAL_ASSESSMENT: 0-10

## 2025-01-17 ASSESSMENT — PAIN DESCRIPTION - ORIENTATION
ORIENTATION: LEFT
ORIENTATION: LEFT

## 2025-01-17 ASSESSMENT — PAIN DESCRIPTION - LOCATION
LOCATION: HIP
LOCATION: HIP

## 2025-01-17 ASSESSMENT — PAIN DESCRIPTION - FREQUENCY
FREQUENCY: INTERMITTENT
FREQUENCY: INTERMITTENT

## 2025-01-17 ASSESSMENT — PAIN DESCRIPTION - ONSET
ONSET: ON-GOING
ONSET: ON-GOING

## 2025-01-17 NOTE — FLOWSHEET NOTE
[facility] Same Day Readiness Screen 2.0  PAT Questionnaire    Does patient have at least one day of 24 hr assist of capable caregiver at d/c?  [x] Yes = 0  [] No = 6    Was patient using an assistive device to walk prior to surgery?  [x] No = 0  [] Yes = 4    How many steps do you have to get to the floor where you plan to initially sleep and use the restroom? (At least 1/2 bath)  [x] 0-2 steps= 0 [] 3+ steps = 1    Has patient fallen in the last 3 months? If yes, how many times?  [x] 0 falls = 0 [] 1+ fall = 1    [] 2+ falls = 4    Does patient have a hx of post-op nausea/vomiting?  [x] No = 0 [] Yes = 2    Other Factors    Age  [] <70 = 0 [x] 71-79 = 1  [] 80+ = 2                     BMI  [x] <30 = 0       []31-39 = 1    [] >40 = 2    Pertinent co-morbidities (consider cardiopulmonary, cardiovascular, neurological, and psychiatric diagnoses)  [] 0 = 0           [x] 1-2 = 2       [] 3+ = 4    Sleep apnea  [x] No = 0         [] Yes = 1    Hx of prolonged emergence from general anesthesia  [x] No = 0         [] Yes = 3      Score: 3      Interpretation:  Red (10-29): Low probability of safe same day discharge  Yellow (6-9): Moderate probability of safe same day discharge  Green (0-5): High probability of safe same day discharge    Score completed by:  Pooja Pettit PT  [id number]

## 2025-01-17 NOTE — FLOWSHEET NOTE
Patient back to bed after short attempt at therapy, dizzy with PT/OT. See flow sheet for VS. Call placed into OR to notify Dr. Delgado of need for admit order.

## 2025-01-17 NOTE — PROGRESS NOTES
Occupational Therapy  Facility/Department: [facility] GENERAL SURGERY  Occupational Therapy Initial Assessment and Treatment      Name: Scarlett Doran  : 1950  MRN: [id number]  Date of Service: 2025    Discharge Recommendations: Anticipate home w/ resolution of symptoms  24 hour supervision or assist  OT Equipment Recommendations  Equipment Needed:  (continue to assess for needs)           Treatment Diagnosis: impaired ADLs/transfers s/p L THR (anterior approach)      Assessment  Performance deficits / Impairments: Decreased functional mobility ;Decreased ADL status  Assessment: Seen POD#0 s/p L THR (direct anterior approach). Session is limited by c/o feeling lightheaded and dizziness. Anticipate pt will make functional progress for return home w/ spouse as symptoms subside. Continue per POC . Discussed w/ RN.  Treatment Diagnosis: impaired ADLs/transfers s/p L THR (anterior approach)  Prognosis: Good  Decision Making: Medium Complexity  REQUIRES OT FOLLOW-UP: Yes  Activity Tolerance  Activity Tolerance: Treatment limited secondary to medical complications (free text)  Activity Tolerance Comments: session limited by c/o nausea and c/o feeling lightheaded     Plan  Occupational Therapy Plan  Times Per Week: 7  Current Treatment Recommendations: Balance training, Functional mobility training, Safety education & training, Equipment evaluation, education, & procurement, Self-Care / ADL    Restrictions  Position Activity Restriction  Other Position/Activity Restrictions: FWBAT, anterior approach, no SLR    Subjective  General  Chart Reviewed: Yes  Additional Pertinent Hx: 74 y.o. F to OR LEFT TOTAL HIP REPLACEMENT DIRECT ANTERIOR APPROACH, JESUS MARVEL ROBOTIC ASSISTED HIP REPLACEMENT.      PMH:Asthma, A-Fib, Fibromyalgia, Bunionectomy, Shoulder Sx (2018).  Family / Caregiver Present: No  Referring Practitioner: Sherie Alanis PA  Diagnosis: Primary OA L Hip  Subjective  Subjective: Seen in PACU. \"I can't    Minutes 25          Timed Code Treatment Minutes:   10    Total Treatment Minutes:  25         Rosa Thompson OTR/JULIA #[id number]

## 2025-01-17 NOTE — OP NOTE
Operative Note      Patient: Scarlett Doran  YOB: 1950  MRN: [id number]    Date of Procedure: 1/17/2025    Pre-Op Diagnosis Codes:      * Primary osteoarthritis of left hip [M16.12]    Post-Op Diagnosis: Same       Procedure(s):  LEFT TOTAL HIP REPLACEMENT DIRECT ANTERIOR APPROACH, JESUS MARVEL ROBOTIC ASSISTED HIP REPLACEMENT    Surgeon(s):  Natalia Delgado MD    Assistant:   Surgical Assistant: Loly Dozier; Ronak Sung; Zaida Grimm  Fellow: Angelica Hinojosa MD  Physician Assistant: Sherie Alanis PA    Anesthesia: General    Estimated Blood Loss (mL): 150cc    Complications: None    Specimens:   * No specimens in log *    Implants:  * No implants in log *      Drains: * No LDAs found *    Findings:  Hip OA, PINCER    Detailed Description of Procedure:     Implant Record:  Trident II Tritanium Clusterhole Acetabular Shell: 46 mm  Trident X3 0deg Polyethylene Insert:  32 mm  In[employer]ia Hip Stem -  High  Offset:  size 4   Biolox delta Ceramic V40 Femoral Head: 32 mm + 4 mm     Acetabular Low Profile Hex Screw : 6.5mm + 25mm       Operative Report:  Indications: The patient is a 74 y.o. female with persistent left hip pain that interferes with daily activity.  The she has failed conservative treatment and after reviewing the risks, benefits and alternatives, she has elected to undergo a total hip arthroplasty.  I have reviewed the benefits and draw backs of an anterior approach and she is prepared to proceed, consent was obtained and all questions were answered to her satisfaction.    Description:   The patient was identified in the preoperative holding area and the correct site of the left hip was marked.  She was then brought to the operating room and kept on her hospital bed in the supine position and general anesthesia was administered.  Well-padded ski boots were placed on both feet to secure them into the [place] table.  She was then transferred to the operative table and placed against a  [address], [address], [address]  Email: [email or web address]  Office: [phone]      01/17/25  1:16 PM        Electronically signed by Natalia Delgado MD on 1/17/2025 at 1:16 PM

## 2025-01-17 NOTE — PROGRESS NOTES
Assisted anesthesia with nerve block. Placed patient on 2L ETCO2 NC O2 prior to start of procedure. Time out performed. Vitals monitored during procedure and remained stable. Patient tolerated procedure well. Bed in lowest position and call light in reach. Will continue to monitor.

## 2025-01-17 NOTE — FLOWSHEET NOTE
VSS, no complaints of pain or nausea. Patient would be appropriate for transfer to floor but no room available. Will place in clinical discharge at this time and change VS to every hour and assessments to every 2 hours.

## 2025-01-17 NOTE — ANESTHESIA PROCEDURE NOTES
Peripheral Block    Patient location during procedure: pre-op  Reason for block: post-op pain management and at surgeon's request  Start time: 1/17/2025 10:30 AM  End time: 1/17/2025 10:35 AM  Staffing  Performed: anesthesiologist and resident/CRNA   Anesthesiologist: Danial Alas MD  Resident/CRNA: Callie Espinosa RN  Performed by: Callie Espinosa RN  Authorized by: Danial Alas MD    Preanesthetic Checklist  Completed: patient identified, IV checked, site marked, risks and benefits discussed, surgical/procedural consents, equipment checked, pre-op evaluation, timeout performed, anesthesia consent given, oxygen available, monitors applied/VS acknowledged, fire risk safety assessment completed and verbalized and blood product R/B/A discussed and consented  Peripheral Block   Patient position: supine  Prep: ChloraPrep  Provider prep: mask  Patient monitoring: continuous pulse ox, IV access, oxygen and responsive to questions  Block type: PENG  Laterality: left  Injection technique: single-shot  Guidance: ultrasound guided    Needle   Needle type: insulated echogenic nerve stimulator needle   Needle gauge: 20 G  Needle localization: ultrasound guidance  Needle length: 10 cm  Assessment   Injection assessment: negative aspiration for heme, no paresthesia on injection, local visualized surrounding nerve on ultrasound and no intravascular symptoms  Hemodynamics: stable  Outcomes: uncomplicated and patient tolerated procedure well    Medications Administered  fentaNYL (SUBLIMAZE) injection - IntraVENous   100 mcg - 1/17/2025 10:30:00 AM  midazolam (VERSED) injection 2 mg/2mL - IntraVENous   2 mg - 1/17/2025 10:30:00 AM  ropivacaine (NAROPIN) injection 0.5% - Perineural   30 mL - 1/17/2025 10:30:00 AM

## 2025-01-17 NOTE — FLOWSHEET NOTE
Call placed to patient's , Radames for update and to make aware of need to be admitted for overnight.

## 2025-01-17 NOTE — H&P
H&P Update     An electronic and hard copy history and physical was reviewed in the patient's chart.  Date of Surgery Update: January 17, 2025  Scarlett Doran was seen and examined.  Patient identified by surgeon; surgical site was confirmed by patient and surgeon.  ?  Signed By: Sincerely,    Natalia Delgado MD [facility]  Orthopaedic Surgeon - Hip Preservation & Sports Medicine   [facility] [address], [address], [address]  Email: [email or web address]  Office: [phone]    01/17/25  11:22 AM     ?    ?  ?

## 2025-01-17 NOTE — ANESTHESIA POSTPROCEDURE EVALUATION
Department of Anesthesiology  Postprocedure Note    Patient: Scarlett Doran  MRN: [id number]  YOB: 1950  Date of evaluation: 1/17/2025    Procedure Summary       Date: 01/17/25 Room / Location: [place] [address] / [facility]    Anesthesia Start: 1137 Anesthesia Stop: 1354    Procedure: LEFT TOTAL HIP REPLACEMENT DIRECT ANTERIOR APPROACH, JESUS MARVEL ROBOTIC ASSISTED HIP REPLACEMENT (Left: Hip) Diagnosis:       Primary osteoarthritis of left hip      (Primary osteoarthritis of left hip [M16.12])    Surgeons: Natalia Delgado MD Responsible Provider: Danial Alas MD    Anesthesia Type: General ASA Status: 2            Anesthesia Type: General    Neeta Phase I: Neeta Score: 10    Neeta Phase II:      Anesthesia Post Evaluation    Patient location during evaluation: PACU  Patient participation: complete - patient participated  Level of consciousness: awake  Pain score: 2  Airway patency: patent  Cardiovascular status: blood pressure returned to baseline  Respiratory status: acceptable  Hydration status: euvolemic  Pain management: adequate    No notable events documented.

## 2025-01-17 NOTE — ANESTHESIA PRE PROCEDURE
consumption: 2300                        Date of last liquid consumption: 01/16/25                        Date of last solid food consumption: 01/16/25    BMI:   Wt Readings from Last 3 Encounters:   01/17/25 69.5 kg (153 lb 3.2 oz)   01/09/25 67.1 kg (148 lb)   11/18/24 68 kg (150 lb)     Body mass index is 25.49 kg/m².    CBC:   Lab Results   Component Value Date/Time    WBC 8.3 01/02/2025 12:35 PM    RBC 4.34 01/02/2025 12:35 PM    HGB 13.8 01/02/2025 12:35 PM    HCT 41.5 01/02/2025 12:35 PM    MCV 95.7 01/02/2025 12:35 PM    RDW 13.2 01/02/2025 12:35 PM     01/02/2025 12:35 PM       CMP:   Lab Results   Component Value Date/Time     01/02/2025 12:35 PM    K 4.2 01/02/2025 12:35 PM    CL 96 01/02/2025 12:35 PM    CO2 26 01/02/2025 12:35 PM    BUN 9 01/02/2025 12:35 PM    CREATININE 0.6 01/02/2025 12:35 PM    GFRAA >60 07/09/2018 10:15 AM    GFRAA >60 12/01/2010 08:11 AM    AGRATIO 1.7 01/02/2025 12:35 PM    LABGLOM >90 01/02/2025 12:35 PM    GLUCOSE 94 01/02/2025 12:35 PM    CALCIUM 9.3 01/02/2025 12:35 PM    BILITOT 0.5 01/02/2025 12:35 PM    ALKPHOS 50 01/02/2025 12:35 PM    AST 20 01/02/2025 12:35 PM    ALT 13 01/02/2025 12:35 PM       POC Tests: No results for input(s): \"POCGLU\", \"POCNA\", \"POCK\", \"POCCL\", \"POCBUN\", \"POCHEMO\", \"POCHCT\" in the last 72 hours.    Coags:   Lab Results   Component Value Date/Time    PROTIME 14.2 01/02/2025 12:35 PM    INR 1.08 01/02/2025 12:35 PM    APTT 28.3 01/02/2025 12:35 PM       HCG (If Applicable): No results found for: \"PREGTESTUR\", \"PREGSERUM\", \"HCG\", \"HCGQUANT\"     ABGs: No results found for: \"PHART\", \"PO2ART\", \"GSA2BWU\", \"EAW7QMN\", \"BEART\", \"O3JACZTW\"     Type & Screen (If Applicable):  No results found for: \"ABORH\", \"LABANTI\"    Drug/Infectious Status (If Applicable):  No results found for: \"HIV\", \"HEPCAB\"    COVID-19 Screening (If Applicable): No results found for: \"COVID19\"        Anesthesia Evaluation  Patient summary reviewed and Nursing notes

## 2025-01-17 NOTE — PROGRESS NOTES
Physical Therapy  Facility/Department: [facility] GENERAL SURGERY  Physical Therapy Initial Assessment    Name: Scarlett Doran  : 1950  MRN: [id number]  Date of Service: 2025    Discharge Recommendations:  24 hour supervision or assist, Outpatient PT    DME -no needs noted      Patient Diagnosis(es): Left CECY anterior  Past Medical History:  has a past medical history of Asthma, Atrial fibrillation (HCC), Fibromyalgia, Hx of smoking, and Left hip pain.  Past Surgical History:  has a past surgical history that includes Tubal ligation; Bunionectomy; eye surgery; and Shoulder arthroscopy (Left, 2018).    Assessment  Assessment: Pt lives with spouse and is normally independent with functional mobility, gait, ADL.  Currently limited by dizziness and was unable to complete mobility evaluation.  Pt will benefit from continued therapy to maximize mobility, safety and independence  Treatment Diagnosis: Decreased functional mobility  Barriers to Learning: none  Activity Tolerance  Activity Tolerance: Patient tolerated evaluation without incident  Activity Tolerance Comments: BP when sitting  108/74, decreased to 91/71,  after sitting several minutes 106/81 Pt noted continued dizziness and slight nausea with sitting. Once supine 132/69. Pt unable to continue treatment, needed to lay down.    Plan  Physical Therapy Plan  General Plan: 2 times a day 7 days a week  Current Treatment Recommendations: Strengthening, Functional mobility training, Stair training, Gait training, Safety education & training, Patient/Caregiver education & training, Equipment evaluation, education, & procurement, Endurance training, Transfer training  Safety Devices  Type of Devices: Left in bed, Call light within reach, Nurse notified (in PACU)    Restrictions  Position Activity Restriction  Other Position/Activity Restrictions: FWBAT, anterior approach, no SLR     Subjective  General  Additional Pertinent Hx: Pt to OR  for LEFT TOTAL

## 2025-01-18 VITALS
HEIGHT: 65 IN | WEIGHT: 153.2 LBS | DIASTOLIC BLOOD PRESSURE: 53 MMHG | RESPIRATION RATE: 16 BRPM | SYSTOLIC BLOOD PRESSURE: 113 MMHG | TEMPERATURE: 98 F | BODY MASS INDEX: 25.52 KG/M2 | OXYGEN SATURATION: 98 % | HEART RATE: 98 BPM

## 2025-01-18 LAB
HCT VFR BLD AUTO: 29 % (ref 36–48)
HGB BLD-MCNC: 9.6 G/DL (ref 12–16)

## 2025-01-18 PROCEDURE — 36415 COLL VENOUS BLD VENIPUNCTURE: CPT

## 2025-01-18 PROCEDURE — 2500000003 HC RX 250 WO HCPCS

## 2025-01-18 PROCEDURE — 85014 HEMATOCRIT: CPT

## 2025-01-18 PROCEDURE — 97535 SELF CARE MNGMENT TRAINING: CPT

## 2025-01-18 PROCEDURE — 96360 HYDRATION IV INFUSION INIT: CPT

## 2025-01-18 PROCEDURE — 96361 HYDRATE IV INFUSION ADD-ON: CPT

## 2025-01-18 PROCEDURE — 6360000002 HC RX W HCPCS

## 2025-01-18 PROCEDURE — 97530 THERAPEUTIC ACTIVITIES: CPT

## 2025-01-18 PROCEDURE — 85018 HEMOGLOBIN: CPT

## 2025-01-18 PROCEDURE — 97116 GAIT TRAINING THERAPY: CPT

## 2025-01-18 PROCEDURE — G0378 HOSPITAL OBSERVATION PER HR: HCPCS

## 2025-01-18 PROCEDURE — 6370000000 HC RX 637 (ALT 250 FOR IP): Performed by: INTERNAL MEDICINE

## 2025-01-18 PROCEDURE — 2580000003 HC RX 258: Performed by: INTERNAL MEDICINE

## 2025-01-18 PROCEDURE — 6370000000 HC RX 637 (ALT 250 FOR IP)

## 2025-01-18 PROCEDURE — 97110 THERAPEUTIC EXERCISES: CPT

## 2025-01-18 RX ORDER — METOPROLOL SUCCINATE 50 MG/1
50 TABLET, EXTENDED RELEASE ORAL NIGHTLY
Status: DISCONTINUED | OUTPATIENT
Start: 2025-01-18 | End: 2025-01-18 | Stop reason: HOSPADM

## 2025-01-18 RX ORDER — AMLODIPINE BESYLATE 2.5 MG/1
2.5 TABLET ORAL NIGHTLY
Status: DISCONTINUED | OUTPATIENT
Start: 2025-01-18 | End: 2025-01-18 | Stop reason: HOSPADM

## 2025-01-18 RX ORDER — DULOXETIN HYDROCHLORIDE 60 MG/1
60 CAPSULE, DELAYED RELEASE ORAL DAILY
Status: DISCONTINUED | OUTPATIENT
Start: 2025-01-18 | End: 2025-01-18 | Stop reason: HOSPADM

## 2025-01-18 RX ORDER — 0.9 % SODIUM CHLORIDE 0.9 %
1000 INTRAVENOUS SOLUTION INTRAVENOUS ONCE
Status: COMPLETED | OUTPATIENT
Start: 2025-01-18 | End: 2025-01-18

## 2025-01-18 RX ORDER — ALBUTEROL SULFATE 0.83 MG/ML
2.5 SOLUTION RESPIRATORY (INHALATION) EVERY 4 HOURS PRN
Status: DISCONTINUED | OUTPATIENT
Start: 2025-01-18 | End: 2025-01-18 | Stop reason: HOSPADM

## 2025-01-18 RX ORDER — MONTELUKAST SODIUM 10 MG/1
10 TABLET ORAL DAILY
Status: DISCONTINUED | OUTPATIENT
Start: 2025-01-18 | End: 2025-01-18 | Stop reason: HOSPADM

## 2025-01-18 RX ADMIN — SODIUM CHLORIDE, PRESERVATIVE FREE 10 ML: 5 INJECTION INTRAVENOUS at 08:49

## 2025-01-18 RX ADMIN — WATER 2000 MG: 1 INJECTION INTRAMUSCULAR; INTRAVENOUS; SUBCUTANEOUS at 03:04

## 2025-01-18 RX ADMIN — APIXABAN 5 MG: 5 TABLET, FILM COATED ORAL at 08:48

## 2025-01-18 RX ADMIN — OXYCODONE 5 MG: 5 TABLET ORAL at 15:03

## 2025-01-18 RX ADMIN — NAPROXEN 500 MG: 250 TABLET ORAL at 08:48

## 2025-01-18 RX ADMIN — ACETAMINOPHEN 650 MG: 325 TABLET ORAL at 03:01

## 2025-01-18 RX ADMIN — OXYCODONE 10 MG: 5 TABLET ORAL at 03:00

## 2025-01-18 RX ADMIN — ACETAMINOPHEN 650 MG: 325 TABLET ORAL at 13:50

## 2025-01-18 RX ADMIN — MONTELUKAST 10 MG: 10 TABLET, FILM COATED ORAL at 08:49

## 2025-01-18 RX ADMIN — SODIUM CHLORIDE 1000 ML: 9 INJECTION, SOLUTION INTRAVENOUS at 12:01

## 2025-01-18 RX ADMIN — ACETAMINOPHEN 650 MG: 325 TABLET ORAL at 08:48

## 2025-01-18 RX ADMIN — SENNOSIDES AND DOCUSATE SODIUM 1 TABLET: 50; 8.6 TABLET ORAL at 08:48

## 2025-01-18 RX ADMIN — OXYCODONE 5 MG: 5 TABLET ORAL at 08:54

## 2025-01-18 RX ADMIN — DULOXETINE HYDROCHLORIDE 60 MG: 60 CAPSULE, DELAYED RELEASE ORAL at 08:48

## 2025-01-18 ASSESSMENT — PAIN DESCRIPTION - LOCATION
LOCATION: HIP

## 2025-01-18 ASSESSMENT — PAIN DESCRIPTION - ORIENTATION
ORIENTATION: LEFT

## 2025-01-18 ASSESSMENT — PAIN - FUNCTIONAL ASSESSMENT
PAIN_FUNCTIONAL_ASSESSMENT: PREVENTS OR INTERFERES SOME ACTIVE ACTIVITIES AND ADLS
PAIN_FUNCTIONAL_ASSESSMENT: PREVENTS OR INTERFERES SOME ACTIVE ACTIVITIES AND ADLS
PAIN_FUNCTIONAL_ASSESSMENT: ACTIVITIES ARE NOT PREVENTED

## 2025-01-18 ASSESSMENT — PAIN SCALES - GENERAL
PAINLEVEL_OUTOF10: 5
PAINLEVEL_OUTOF10: 5
PAINLEVEL_OUTOF10: 2
PAINLEVEL_OUTOF10: 8
PAINLEVEL_OUTOF10: 3

## 2025-01-18 ASSESSMENT — PAIN DESCRIPTION - DESCRIPTORS
DESCRIPTORS: ACHING

## 2025-01-18 ASSESSMENT — PAIN DESCRIPTION - PAIN TYPE
TYPE: SURGICAL PAIN
TYPE: SURGICAL PAIN

## 2025-01-18 ASSESSMENT — PAIN DESCRIPTION - FREQUENCY
FREQUENCY: CONTINUOUS
FREQUENCY: INTERMITTENT

## 2025-01-18 ASSESSMENT — PAIN DESCRIPTION - ONSET
ONSET: ON-GOING
ONSET: ON-GOING

## 2025-01-18 NOTE — PROGRESS NOTES
4 Eyes Skin Assessment     NAME:  Scarlett Doran  YOB: 1950  MEDICAL RECORD NUMBER:  [id number]    The patient is being assessed for  Admission    I agree that at least one RN has performed a thorough Head to Toe Skin Assessment on the patient. ALL assessment sites listed below have been assessed.      Areas assessed by both nurses:    Head, Face, Ears, Shoulders, Back, Chest, Arms, Elbows, Hands, Sacrum. Buttock, Coccyx, Ischium, Legs. Feet and Heels, and Under Medical Devices     Incision to L hip         Does the Patient have a Wound? No noted wound(s)       Graeme Prevention initiated by RN: No  Wound Care Orders initiated by RN: No    Pressure Injury (Stage 3,4, Unstageable, DTI, NWPT, and Complex wounds) if present, place Wound referral order by RN under : No    New Ostomies, if present place, Ostomy referral order under : No     Nurse 1 eSignature: Electronically signed by Anat Marrero RN on 1/18/25 at 12:41 AM EST    **SHARE this note so that the co-signing nurse can place an eSignature**    Nurse 2 eSignature: Electronically signed by Linda Fragoso RN on 1/18/25 at 12:43 AM EST

## 2025-01-18 NOTE — PLAN OF CARE
Problem: Discharge Planning  Goal: Discharge to home or other facility with appropriate resources  Outcome: Completed  Flowsheets (Taken 1/18/2025 0800)  Discharge to home or other facility with appropriate resources:   Identify barriers to discharge with patient and caregiver   Arrange for needed discharge resources and transportation as appropriate   Identify discharge learning needs (meds, wound care, etc)   Arrange for interpreters to assist at discharge as needed   Refer to discharge planning if patient needs post-hospital services based on physician order or complex needs related to functional status, cognitive ability or social support system

## 2025-01-18 NOTE — PROGRESS NOTES
Occupational Therapy  Facility/Department: [facility] 5T ORTHO/NEURO  Daily Treatment Note  NAME: Scarlett Doran  : 1950  MRN: [id number]    Date of Service: 2025    Discharge Recommendations:  24 hour supervision or assist  OT Equipment Recommendations  Equipment Needed: No    Assessment   Assessment: Pt is 74y F from home w/  - pt is POD1 planned L THR (anterior approach) - presenting at SBA for fxl mobility and ADL tasks in session. Pt req occasional cues for safety and sequencing, but overall demo good awareness. Pt planned d/c later this date to home w/  - recommend initial 24hr. Pt progressing w/ OT, recommend cont'd skilled OT while in acute care - to maximize safety, IND and fxl act mana as needed for ADL and fxl mobility tasks. Will cont to follow as inpt per POC  Discharge Recommendations: 24 hour supervision or assist  Equipment Needed: No     Plan  Occupational Therapy Plan  Times Per Week: 7  Current Treatment Recommendations: Balance training;Functional mobility training;Safety education & training;Equipment evaluation, education, & procurement;Self-Care / ADL    Restrictions  Position Activity Restriction  Other Position/Activity Restrictions: FWBAT, anterior approach, no SLR    Subjective  Subjective  Subjective: Pt sitting up in recliner on OT approach - agreed to tx.  Pain: not reported  Orientation  Overall Orientation Status: Within Normal Limits  Cognition  Overall Cognitive Status: WNL       Objective  ADL  Grooming: Stand by assistance  Grooming Skilled Clinical Factors: hand hygiene in stance at sink post void  LE Dressing Skilled Clinical Factors: nto completed in session- verbally reviewed strategies  Toileting: Stand by assistance  Functional Mobility Skilled Clinical Factors: occ cues for safety / sequencing w/ use of SW in fxl mobility in room / bathroom - SBA  Product Used : Soap and water        Safety Devices  Type of Devices: Call light within reach;Nurse

## 2025-01-18 NOTE — CARE COORDINATION
Case Management Assessment  Initial Evaluation    Date/Time of Evaluation: 1/18/2025 12:27 PM  Assessment Completed by: AVIS Shepard    If patient is discharged prior to next notation, then this note serves as note for discharge by case management.    Patient Name: Scarlett Doran                   YOB: 1950  Diagnosis: Primary osteoarthritis of left hip [M16.12]  Hip arthritis [M16.10]  Status post total hip replacement, left [Z96.642]                   Date / Time: 1/17/2025  9:24 AM    Patient Admission Status: Observation   Readmission Risk (Low < 19, Mod (19-27), High > 27): No data recorded  Current PCP: Modesto Alvares MD  PCP verified by CM? Yes    Chart Reviewed: Yes      History Provided by: Patient  Patient Orientation: Alert and Oriented    Patient Cognition: Alert    Hospitalization in the last 30 days (Readmission):  No    If yes, Readmission Assessment in CM Navigator will be completed.    Advance Directives:      Code Status: Full Code   Patient's Primary Decision Maker is: Legal Next of Kin      Discharge Planning:    Patient lives with: Spouse/Significant Other Type of Home: House  Primary Care Giver: Self  Patient Support Systems include: Spouse/Significant Other   Current Financial resources: Medicare, Other (Comment) (medicare supplemental)  Current community resources: None  Current services prior to admission: Durable Medical Equipment            Current DME: Walker            Type of Home Care services:  None    ADLS  Prior functional level: Independent in ADLs/IADLs  Current functional level: Independent in ADLs/IADLs    PT AM-PAC: 18 /24  OT AM-PAC: 18 /24    Family can provide assistance at DC: Yes  Would you like Case Management to discuss the discharge plan with any other family members/significant others, and if so, who? Yes  Plans to Return to Present Housing: Yes  Other Identified Issues/Barriers to RETURNING to current housing: yes  Potential Assistance needed at  discharge: Outpatient PT/OT            Potential DME:    Patient expects to discharge to: House  Plan for transportation at discharge: Family    Financial    Payor: MEDICARE / Plan: MEDICARE PART A AND B / Product Type: *No Product type* /     Does insurance require precert for SNF: No    Potential assistance Purchasing Medications: No  Meds-to-Beds request:        [id number] - [address] [phone] - [phone]  [address] [facility] [address]  Phone: [phone] Fax: [phone]      Notes:    Factors facilitating achievement of predicted outcomes: Family support, Motivated, Cooperative, and Pleasant    Barriers to discharge: discharge order noted    Additional Case Management Notes: CM met with pt and spouse at bedside. Pt will return home with 24 hour support from spouse and OP PT services. Pt has a rolling walker at home if needed. Pt and spouse report no HHC, DME or CM needs for discharge home today.    The Plan for Transition of Care is related to the following treatment goals of Primary osteoarthritis of left hip [M16.12]  Hip arthritis [M16.10]  Status post total hip replacement, left [Z96.642]    IF APPLICABLE: The Patient and/or patient representative Scarlett and her family were provided with a choice of provider and agrees with the discharge plan. Freedom of choice list with basic dialogue that supports the patient's individualized plan of care/goals and shares the quality data associated with the providers was provided to: Patient, Patient Representative   Patient Representative Name: Spouse     The Patient and/or Patient Representative Agree with the Discharge Plan? Yes    AVIS Shepard  Case Management Department  Ph: [phone] Fax: [phone]

## 2025-01-18 NOTE — CONSULTS
Hospital Medicine Consult History & Physical    Date of Service: Pt seen/examined in consultation on 1/17/2025 at the request of Natalia Delgado MD for medical management     Chief Complaint: Left hip pain    Presenting Admission History:   74 y.o. female who underwent left total hip replacement direct anterior approach by Dr. Pickard on 10/17/2024.  Patient reports pain rated as 3 out of 10 in severity when she moves however she is pain-free when she is just laying in bed.  Patient denies any nausea vomiting.  Patient already got out of bed a couple times.  No bowel movement since surgery.  Patient has been off her Eliquis that she takes for A-fib for the past 2 and half days prior to her surgery.  She currently denies any other complaints at this time.  Hospitalist service was consulted to assist with medical management..      Assessment:     Primary osteoarthritis of left hip  Paroxysmal A-fib on Eliquis.  Essential hypertension.  Fibromyalgia.  Intermittent asthma without exacerbation    Plan    Post operative care and pain control per primary team.  Eliquis has been resumed.  Resume the rest of home medication, Norvasc, Cymbalta, Toprol-XL,  PT/OT evaluation and treatment.  Encouraged the use of incentive spirometry.  Already on Eliquis for DVT prophylaxis.      Physical Exam Performed:  /67   Pulse 60   Temp 97.6 °F (36.4 °C) (Oral)   Resp 16   Ht 1.651 m (5' 5\")   Wt 69.5 kg (153 lb 3.2 oz)   SpO2 91%   BMI 25.49 kg/m²   General appearance:  No apparent distress, appears stated age and cooperative.  HEENT:  Pupils equal, round, and reactive to light. Conjunctivae/corneas clear.  Respiratory:  Normal respiratory effort. Clear to auscultation, bilaterally without Rales/Wheezes/Rhonchi.  Cardiovascular:  Regular rate and rhythm with normal S1/S2 without murmurs, rubs or gallops.  Abdomen:  Soft, non-tender, non-distended with normal bowel sounds.  Musculoskeletal:  No clubbing, cyanosis or

## 2025-01-18 NOTE — PROGRESS NOTES
In-Patient Progress Note    Patient:  Scarlett Doran 74 y.o. female MRN: [id number]     Date of Service: 1/18/2025    Hospital Day: 2      Chief complaint: had no chief complaint listed for this encounter.      Assessment and Plan   74 y.o. female who underwent left total hip replacement direct anterior approach by Dr. Pickard on 10/17/2024.  Patient reports pain rated as 3 out of 10 in severity when she moves however she is pain-free when she is just laying in bed.  Patient denies any nausea vomiting.  Patient already got out of bed a couple times.  No bowel movement since surgery.  Patient has been off her Eliquis that she takes for A-fib for the past 2 and half days prior to her surgery.  She currently denies any other complaints at this time.  Hospitalist service was consulted to assist with medical management..       Assessment:     Primary osteoarthritis of left hip  Paroxysmal A-fib on Eliquis.  Essential hypertension.  Fibromyalgia.  Intermittent asthma without exacerbation  Transient hypotension, likely vasovagal episode  Post operative anemia, stable     Plan     Post operative care and pain control per primary team.  Eliquis has been resumed.  Resume the rest of home medication, Norvasc, Cymbalta, Toprol-XL,  PT/OT team evaluated   Orthostatic vitals were stable; Hb 9.6.  Patient stable from internal medicine standpoint.     Diet ADULT DIET; Regular   DVT Prophylaxis [] Lovenox, []  Heparin, [] SCDs, [] Ambulation,  [] Eliquis, [] Xarelto  [] Coumadin   Peptic ulcer prophylaxis -   Code Status Full Code   Disposition From / Current living situation : home  Expected Disposition: home  Estimated Date of Discharge: today     Surrogate Decision Maker/ POA -       Personally reviewed Lab Studies and Imaging       Subjective:     Chief Complaint: No chief complaint on file.       Seen and evaluated at bedside  Nursing team had reported that patient had episode of hypotension while working with physical  reflex Magnesium 4.4 3.5 - 5.1 mmol/L    Chloride 102 99 - 110 mmol/L    CO2 26 21 - 32 mmol/L    Anion Gap 7 3 - 16    Glucose 126 (H) 70 - 99 mg/dL    BUN 8 7 - 20 mg/dL    Creatinine 0.6 0.6 - 1.2 mg/dL    Est, Glom Filt Rate >90 >60    Calcium 8.9 8.3 - 10.6 mg/dL   Hemoglobin and Hematocrit    Collection Time: 01/18/25 12:11 PM   Result Value Ref Range    Hemoglobin 9.6 (L) 12.0 - 16.0 g/dL    Hematocrit 29.0 (L) 36.0 - 48.0 %           Electronically signed by Vin Pandey MD on 1/18/2025 at 1:58 PM      Comment: Please note this report has been produced using speech recognition software and may contain errors related to that system including errors in grammar, punctuation, and spelling, as well as words and phrases that may be inappropriate. If there are any questions or concerns please feel free to contact the dictating provider for clarification.

## 2025-01-18 NOTE — FLOWSHEET NOTE
PACU Transfer Note    Vitals:    01/17/25 1900   BP: 129/60   Pulse: 60   Resp: 14   Temp: 98.3 °F (36.8 °C)   SpO2: 95%       In: 517 [P.O.:240; I.V.:277]  Out: -     Pain assessment:  present - adequately treated  Pain Level: 3    Report given to Receiving unit RNRivka at bedside in the PACU previously. Transferred with all belongings, including walker to ready room 5516 per in house transporterFrankie.  is aware of room assignment.    1/17/2025 7:29 PM

## 2025-01-18 NOTE — PROGRESS NOTES
Physical Therapy  Facility/Department: [facility] ORTHO/NEURO  Physical Therapy Daily Treatment Note    Name: Scarlett Doran  : 1950  MRN: [id number]  Date of Service: 2025    Discharge Recommendations:  24 hour supervision or assist, Outpatient PT   PT Equipment Recommendations  Equipment Needed: No      Patient Diagnosis(es):   Past Medical History:  has a past medical history of Asthma, Atrial fibrillation (HCC), Fibromyalgia, Hx of smoking, and Left hip pain.  Past Surgical History:  has a past surgical history that includes Tubal ligation; Bunionectomy; eye surgery; and Shoulder arthroscopy (Left, 2018).    Assessment  Assessment: Pt showing gradual progress with all mobility and tried several AD.  Pt prefers standard walker or crutches, and pt has both.  Pt has THR booklet and educated on this.  Rec 24 hour assist, PT.  No DME needs.  Pt met all goals.  D/c.  Treatment Diagnosis: Decreased functional mobility  Therapy Prognosis: Good  Decision Making: Low Complexity  Requires PT Follow-Up: Yes    Plan  Physical Therapy Plan  General Plan: 2 times a day 7 days a week  Current Treatment Recommendations: Strengthening, Functional mobility training, Stair training, Gait training, Safety education & training, Patient/Caregiver education & training, Equipment evaluation, education, & procurement, Endurance training, Transfer training  Safety Devices  Type of Devices: Call light within reach, Nurse notified, All fall risk precautions in place, Left in chair (No chair alarm available, nursing notified, pt states she will not get up without calling nurse first)    Restrictions  Position Activity Restriction  Other Position/Activity Restrictions: FWBAT, anterior approach, no SLR     Subjective  General  Chart Reviewed: Yes  Additional Pertinent Hx: Pt to OR  for LEFT TOTAL HIP REPLACEMENT DIRECT ANTERIOR APPROACH, JESUS MARVEL ROBOTIC ASSISTED HIP REPLACEMENT  Family/Caregiver Present: Yes

## 2025-01-20 ENCOUNTER — OFFICE VISIT (OUTPATIENT)
Dept: ORTHOPEDIC SURGERY | Age: 75
End: 2025-01-20

## 2025-01-20 ENCOUNTER — HOSPITAL ENCOUNTER (OUTPATIENT)
Dept: PHYSICAL THERAPY | Age: 75
Setting detail: THERAPIES SERIES
Discharge: HOME | End: 2025-01-20
Attending: ORTHOPAEDIC SURGERY
Payer: MEDICARE

## 2025-01-20 VITALS — WEIGHT: 153 LBS | HEIGHT: 65 IN | BODY MASS INDEX: 25.49 KG/M2

## 2025-01-20 DIAGNOSIS — Z96.642 STATUS POST HIP REPLACEMENT, LEFT: Primary | ICD-10-CM

## 2025-01-20 DIAGNOSIS — M16.12 PRIMARY OSTEOARTHRITIS OF LEFT HIP: ICD-10-CM

## 2025-01-20 PROCEDURE — 97164 PT RE-EVAL EST PLAN CARE: CPT

## 2025-01-20 PROCEDURE — 97110 THERAPEUTIC EXERCISES: CPT

## 2025-01-20 PROCEDURE — 97116 GAIT TRAINING THERAPY: CPT

## 2025-01-20 PROCEDURE — 99024 POSTOP FOLLOW-UP VISIT: CPT | Performed by: ORTHOPAEDIC SURGERY

## 2025-01-20 NOTE — PLAN OF CARE
GAIT/STAIRS.  PATIENT WILL LIKELY BENEFIT FROM SKILLED PT TO ADDRESS HER IMAPIRMENTS AND FUNCTIONAL LIMITATIONS TO RESTORE HER TO HER PLOF FOLLOWING RE-EVALUATION AFTER L THR. .    Pt. presents with the functional impairments and activity limitations listed below and would benefit from Outpatient PT to address the below impairments as well as improve pain, and restore function.     Functional Impairments:   Noted lumbar/proximal hip/LE joint hypomobility, Decreased LE functional ROM, Decreased core/proximal hip strength and neuromuscular control, Decreased LE functional strength , Reduced balance/proprioceptive control, Decreased LE endurance, and Gait instability/impairment    Functional Activity Limitations (from functional questionnaire and intake):  Reduced ability to tolerate prolonged functional positions  Reduced ability or difficulty with changes of positions or transfers between positions  Reduced ability to maintain good posture and demonstrate good body mechanics with sitting, bending, and lifting  Reduced ability to sleep  Reduced ability to perform lifting, reaching, carrying tasks  Reduced ability to squat  Reduced ability to ambulate prolonged functional periods/distances/surfaces  Reduced ability to ascend/descend stairs    Participation Restrictions:   Reduced participation in home management   Reduced participation in social activities  DECREASED ABILITY TO PARTICIPATE FULLY IN CAREGIVING TASKS FOR KELSIE    Classification :   Signs/symptoms consistent with knee OA/hip OA  Signs/symptoms consistent with functional hip weakness/NMR control   Signs/symptoms consistent with tendinitis/tendinosis  Signs/symptoms consistent with pathology which may benefit from Dry needling  Signs/symptoms consistent with gait instability/impairment    Barriers to/and or personal factors that will affect rehab potential:   None noted    Physical Therapy Evaluation Complexity Justification  [x] A history of present problem

## 2025-01-20 NOTE — PROGRESS NOTES
attention for an addendum.  All efforts were made to ensure that this office note is accurate.

## 2025-01-20 NOTE — DISCHARGE SUMMARY
[facility]  Discharge Summary    Date:  2025    Name:  Scarlett Doran  Address:  [address]  [place] [address]    :  1950      Age:   74 y.o.    Medical Record Number:  [id number]  Admit Date: 2025  Date of Discharge: 2025    PROCEDURE:   (Left) Total Hip Replacement, Direct Anterior Approach      REASON FOR ADMISSION:    Postoperative pain control, vital sign monitoring, physical therapy, and IV fluid resuscitation.    Current Medications:        Review of Systems:  Unchanged from preoperative H&P     Past Medical History:  Past Medical History:   Diagnosis Date    Asthma     as a child    Atrial fibrillation (HCC)     Fibromyalgia     Hx of smoking     Left hip pain        Past Surgical History:  Past Surgical History:   Procedure Laterality Date    BUNIONECTOMY      EYE SURGERY      steve cateracts, detached retina    SHOULDER ARTHROSCOPY Left 2018    left shoulder, SAD, DCE, Rotator cuff repair with graft, extensive debridement    TUBAL LIGATION         Medications:  No current facility-administered medications on file prior to encounter.     Current Outpatient Medications on File Prior to Encounter   Medication Sig Dispense Refill    levocetirizine (XYZAL) 5 MG tablet Take 1 tablet by mouth nightly      metoprolol succinate (TOPROL XL) 50 MG extended release tablet Take 1 tablet by mouth at bedtime      DULoxetine (CYMBALTA) 30 MG extended release capsule Take 2 capsules by mouth daily      zolpidem (AMBIEN) 10 MG tablet 1 tablet nightly as needed.      tretinoin (RETIN-A) 0.05 % cream Apply topically nightly      Calcium Carbonate-Vitamin D (OYSTER SHELL CALCIUM/D) 500-200 MG-UNIT TABS Take 1 tablet by mouth daily      montelukast (SINGULAIR) 10 MG tablet Take 1 tablet by mouth daily      amLODIPine (NORVASC) 2.5 MG tablet Take 1 tablet by mouth nightly      albuterol sulfate HFA (PROVENTIL;VENTOLIN;PROAIR) 108 (90

## 2025-01-22 ENCOUNTER — TELEPHONE (OUTPATIENT)
Dept: ORTHOPEDIC SURGERY | Age: 75
End: 2025-01-22

## 2025-01-23 ENCOUNTER — HOSPITAL ENCOUNTER (OUTPATIENT)
Dept: PHYSICAL THERAPY | Age: 75
Setting detail: THERAPIES SERIES
Discharge: HOME | End: 2025-01-23
Attending: ORTHOPAEDIC SURGERY
Payer: MEDICARE

## 2025-01-23 ENCOUNTER — HOSPITAL ENCOUNTER (OUTPATIENT)
Dept: PHYSICAL THERAPY | Age: 75
Setting detail: THERAPIES SERIES
End: 2025-01-23
Attending: ORTHOPAEDIC SURGERY
Payer: MEDICARE

## 2025-01-23 PROCEDURE — 97110 THERAPEUTIC EXERCISES: CPT

## 2025-01-23 PROCEDURE — 97112 NEUROMUSCULAR REEDUCATION: CPT

## 2025-01-23 NOTE — FLOWSHEET NOTE
[facility] - Outpatient Rehabilitation and Therapy [address], [place] [address] office: [phone] fax: [phone]        Physical Therapy: TREATMENT/PROGRESS NOTE   Patient: Scarlett Doran (74 y.o. female)   Examination Date: 2025   :  1950 MRN: [id number]   Visit #: 3   Insurance Allowable Auth Needed   Medicare []Yes    [x]No    Insurance: Payor: MEDICARE / Plan: MEDICARE PART A AND B / Product Type: *No Product type* /   Insurance ID: [id number] - (Medicare)  Secondary Insurance (if applicable): [facility]   Treatment Diagnosis:     ICD-10-CM    1. Weakness of left hip  R29.898       2. Impaired functional mobility, balance, gait, and endurance  Z74.09          Medical Diagnosis:  Primary osteoarthritis of left hip [M16.12]   Referring Physician: Natalia Delgado MD  PCP: Modesto Alvares MD     Plan of care signed (Y/N):     Date of Patient follow up with Physician:      Plan of Care Report: NO  POC update due: (10 visits /OR AUTH LIMITS, whichever is less2025                                               Medical History:  Comorbidities:  Hypertension  Other Cardio/Pulmonary Conditions: A-FIB  Prior Surgeries: RTC REPAIR, BUNIONECTOMY  Relevant Medical History: SEE ABOVE                                         Precautions/ Contra-indications:           Latex allergy:  NO  Pacemaker:    NO  Contraindications for Manipulation:  PENDING L THR  Date of Surgery: 2025  Other:    Red Flags:  None    Suicide Screening:   The patient did not verbalize a primary behavioral concern, suicidal ideation, suicidal intent, or demonstrate suicidal behaviors.    Preferred Language for Healthcare:   [x] English       [] other:    SUBJECTIVE EXAMINATION     Patient stated complaint: : Patient reports her L leg feels really good but does admit to swelling in L Leg; when asked if wearing her compression stockings she stated she is on eloquist and felt like

## 2025-01-27 ENCOUNTER — HOSPITAL ENCOUNTER (OUTPATIENT)
Dept: PHYSICAL THERAPY | Age: 75
Setting detail: THERAPIES SERIES
Discharge: HOME | End: 2025-01-27
Attending: ORTHOPAEDIC SURGERY
Payer: MEDICARE

## 2025-01-27 PROCEDURE — 97112 NEUROMUSCULAR REEDUCATION: CPT

## 2025-01-27 PROCEDURE — 97110 THERAPEUTIC EXERCISES: CPT

## 2025-01-27 NOTE — FLOWSHEET NOTE
include: Passive Range of Motion, Gr I-IV mobilizations, Soft Tissue Mobilization, Dry Needling/IASTM, Trigger Point Release, and Myofascial Release  Modalities as needed that may include: Cryotherapy, Electrical Stimulation, Thermal Agents, and Vasoneumatic Compression  Patient education on joint protection, postural re-education, and activity modification  Aquatic Therapy (42895)    Plan: POC initiated as per evaluation; initiate post-op exercises, WBAT ; hip anterior precautions, No SLR for 4 weeks as well as resisted hip AB    [] At this time, patient has participated in (# of visits) physical therapy treatments and failed to make significant progress towards their goals. The therapist has determined that the patient will not benefit from formal therapy. They are no longer appropriate for physical therapy and are appropriate for return to referring provider. Patient will be d/c from formal therapy at this time.   [] The physical therapist has determined that the patient will not benefit from formal therapy. The patient has significant functional limitations such as  to be addressed by return back to referring provider.     Electronically Signed by Devi Davis  PTA [id number] Date: 01/27/2025     Note: Portions of this note have been templated and/or copied from initial evaluation, reassessments and prior notes for documentation efficiency.

## 2025-01-30 ENCOUNTER — HOSPITAL ENCOUNTER (OUTPATIENT)
Dept: PHYSICAL THERAPY | Age: 75
Setting detail: THERAPIES SERIES
Discharge: HOME | End: 2025-01-30
Attending: ORTHOPAEDIC SURGERY
Payer: MEDICARE

## 2025-01-30 PROCEDURE — 97110 THERAPEUTIC EXERCISES: CPT | Performed by: SPECIALIST/TECHNOLOGIST

## 2025-01-30 PROCEDURE — 97530 THERAPEUTIC ACTIVITIES: CPT | Performed by: SPECIALIST/TECHNOLOGIST

## 2025-01-30 PROCEDURE — 97140 MANUAL THERAPY 1/> REGIONS: CPT | Performed by: SPECIALIST/TECHNOLOGIST

## 2025-01-30 NOTE — FLOWSHEET NOTE
No      CHARGE CAPTURE     PT CHARGE GRID   CPT Code (TIMED) minutes # CPT Code (UNTIMED) #     Therex (36908)  20 2  EVAL:LOW (34330 - Typically 20 minutes face-to-face)     Neuromusc. Re-ed (62306)    Re-Eval (93886)     Manual (75972) 10 1  Estim Unattended (93858)     Ther. Act (92165) 10 1  Mech. Traction (27732)     Gait (66054)    Dry Needle 1-2 muscle (33922)     Aquatic Therex (94771)    Dry Needle 3+ muscle (67308)     Iontophoresis (02873)    VASO (56688)     Ultrasound (35683)    Group Therapy (60679)     Estim Attended (55846)    Canalith Repositioning (45606)     Physical Performance Test (96816)    Custom orthotic ()     Other:    Other:    Total Timed Code Tx Minutes 40 3       Total Treatment Minutes 40        Charge Justification:  (65149) THERAPEUTIC EXERCISE - Provided verbal/tactile cueing for activities related to strengthening, flexibility, endurance, ROM performed to prevent loss of range of motion, maintain or improve muscular strength or increase flexibility, following either an injury or surgery.   (27703) HOME EXERCISE PROGRAM - Reviewed/Progressed HEP activities related to strengthening, flexibility, endurance, ROM performed to prevent loss of range of motion, maintain or improve muscular strength or increase flexibility, following either an injury or surgery.  (51447) HOME EXERCISE PROGRAM - Reviewed/Progressed HEP activities related to neuromuscular reeducation of movement, balance, coordination, kinesthetic sense, posture, and/or proprioception for sitting and/or standing activities    (67077) THERAPEUTIC ACTIVITY - use of dynamic activities to improve functional performance. (Ex include squatting, ascending/descending stairs, walking, bending, lifting, catching, throwing, pushing, pulling, jumping.)  Direct, one on one contact, billed in 15-minute increments.      GOALS     GOALS:  Patient stated goal: BE ABLE TO RECEIVE EXERCISES PRIOR TO L THR TO STRENGTHEN L LE  []

## 2025-02-03 ENCOUNTER — OFFICE VISIT (OUTPATIENT)
Dept: ORTHOPEDIC SURGERY | Age: 75
End: 2025-02-03

## 2025-02-03 VITALS — WEIGHT: 153 LBS | HEIGHT: 65 IN | BODY MASS INDEX: 25.49 KG/M2

## 2025-02-03 DIAGNOSIS — Z96.642 STATUS POST HIP REPLACEMENT, LEFT: Primary | ICD-10-CM

## 2025-02-03 DIAGNOSIS — M16.12 PRIMARY OSTEOARTHRITIS OF LEFT HIP: ICD-10-CM

## 2025-02-03 PROCEDURE — 99024 POSTOP FOLLOW-UP VISIT: CPT

## 2025-02-04 ENCOUNTER — APPOINTMENT (OUTPATIENT)
Dept: PHYSICAL THERAPY | Age: 75
End: 2025-02-04
Attending: ORTHOPAEDIC SURGERY
Payer: MEDICARE

## 2025-02-05 ENCOUNTER — HOSPITAL ENCOUNTER (OUTPATIENT)
Dept: PHYSICAL THERAPY | Age: 75
Setting detail: THERAPIES SERIES
Discharge: HOME | End: 2025-02-05
Attending: ORTHOPAEDIC SURGERY
Payer: MEDICARE

## 2025-02-05 PROCEDURE — 97530 THERAPEUTIC ACTIVITIES: CPT

## 2025-02-05 PROCEDURE — 97140 MANUAL THERAPY 1/> REGIONS: CPT

## 2025-02-05 PROCEDURE — 97110 THERAPEUTIC EXERCISES: CPT

## 2025-02-05 PROCEDURE — 97112 NEUROMUSCULAR REEDUCATION: CPT

## 2025-02-05 NOTE — FLOWSHEET NOTE
[facility] - Outpatient Rehabilitation and Therapy [address], [place] [address] office: [phone] fax: [phone]        Physical Therapy: TREATMENT/PROGRESS NOTE   Patient: Scarlett Doran (74 y.o. female)   Examination Date: 2025   :  1950 MRN: [id number]   Visit #: 6   Insurance Allowable Auth Needed   Medicare []Yes    [x]No    Insurance: Payor: MEDICARE / Plan: MEDICARE PART A AND B / Product Type: *No Product type* /   Insurance ID: [id number] - (Medicare)  Secondary Insurance (if applicable): [facility]   Treatment Diagnosis:     ICD-10-CM    1. Weakness of left hip  R29.898       2. Impaired functional mobility, balance, gait, and endurance  Z74.09          Medical Diagnosis:  Primary osteoarthritis of left hip [M16.12]   Referring Physician: Natalia Delgado MD  PCP: Modesto Alvares MD     Plan of care signed (Y/N):     Date of Patient follow up with Physician:      Plan of Care Report: NO  POC update due: (10 visits /OR AUTH LIMITS, whichever is less2025                                               Medical History:  Comorbidities:  Hypertension  Other Cardio/Pulmonary Conditions: A-FIB  Prior Surgeries: RTC REPAIR, BUNIONECTOMY  Relevant Medical History: SEE ABOVE                                         Precautions/ Contra-indications:           Latex allergy:  NO  Pacemaker:    NO  Contraindications for Manipulation: recent surgical history (relative)  Date of Surgery: 2025  Other:    Red Flags:  None    Suicide Screening:   The patient did not verbalize a primary behavioral concern, suicidal ideation, suicidal intent, or demonstrate suicidal behaviors.    Preferred Language for Healthcare:   [x] English       [] other:    SUBJECTIVE EXAMINATION     Patient stated complaint:SAW MD THE OTHER DAY AND THEY ARE PLEASED WITH HER PROGRESS.  PATIENT FEELS LIKE SHE SELPT WRONG LAST NIGHT AND DOES HAVE SOME MILD L BUTTOCK PAIN AT 2-3/10..

## 2025-02-06 ENCOUNTER — APPOINTMENT (OUTPATIENT)
Dept: PHYSICAL THERAPY | Age: 75
End: 2025-02-06
Attending: ORTHOPAEDIC SURGERY
Payer: MEDICARE

## 2025-02-07 ENCOUNTER — APPOINTMENT (OUTPATIENT)
Dept: PHYSICAL THERAPY | Age: 75
End: 2025-02-07
Attending: ORTHOPAEDIC SURGERY
Payer: MEDICARE

## 2025-02-10 ENCOUNTER — HOSPITAL ENCOUNTER (OUTPATIENT)
Dept: PHYSICAL THERAPY | Age: 75
Setting detail: THERAPIES SERIES
Discharge: HOME | End: 2025-02-10
Attending: ORTHOPAEDIC SURGERY
Payer: MEDICARE

## 2025-02-10 PROCEDURE — 97112 NEUROMUSCULAR REEDUCATION: CPT

## 2025-02-10 PROCEDURE — 97110 THERAPEUTIC EXERCISES: CPT

## 2025-02-10 PROCEDURE — 97530 THERAPEUTIC ACTIVITIES: CPT

## 2025-02-10 NOTE — FLOWSHEET NOTE
[facility] - Outpatient Rehabilitation and Therapy [address], [place] [address] office: [phone] fax: [phone]        Physical Therapy: TREATMENT/PROGRESS NOTE   Patient: Scarlett Doran (74 y.o. female)   Examination Date: 02/10/2025   :  1950 MRN: [id number]   Visit #: 7   Insurance Allowable Auth Needed   Medicare []Yes    [x]No    Insurance: Payor: MEDICARE / Plan: MEDICARE PART A AND B / Product Type: *No Product type* /   Insurance ID: [id number] - (Medicare)  Secondary Insurance (if applicable): [facility]   Treatment Diagnosis:     ICD-10-CM    1. Weakness of left hip  R29.898       2. Impaired functional mobility, balance, gait, and endurance  Z74.09          Medical Diagnosis:  Primary osteoarthritis of left hip [M16.12]   Referring Physician: Natalia Delgado MD  PCP: Modesto Alvares MD     Plan of care signed (Y/N):   Date of Patient follow up with Physician:      Plan of Care Report: NO  POC update due: (10 visits /OR AUTH LIMITS, whichever is less2025                                               Medical History:  Comorbidities:  Hypertension  Other Cardio/Pulmonary Conditions: A-FIB  Prior Surgeries: RTC REPAIR, BUNIONECTOMY  Relevant Medical History: SEE ABOVE                                         Precautions/ Contra-indications:           Latex allergy:  NO  Pacemaker:    NO  Contraindications for Manipulation: recent surgical history (relative)  Date of Surgery: 2025  Other:    Red Flags:  None    Suicide Screening:   The patient did not verbalize a primary behavioral concern, suicidal ideation, suicidal intent, or demonstrate suicidal behaviors.    Preferred Language for Healthcare:   [x] English       [] other:    SUBJECTIVE EXAMINATION     Patient stated complaint:2/10: States she accidentally fell asleep last Friday and missed her PT appointment.  States she is overall doing great, nut did get fatigued at a grandson's

## 2025-02-13 ENCOUNTER — APPOINTMENT (OUTPATIENT)
Dept: PHYSICAL THERAPY | Age: 75
End: 2025-02-13
Attending: ORTHOPAEDIC SURGERY
Payer: MEDICARE

## 2025-02-14 ENCOUNTER — HOSPITAL ENCOUNTER (OUTPATIENT)
Dept: PHYSICAL THERAPY | Age: 75
Setting detail: THERAPIES SERIES
Discharge: HOME | End: 2025-02-14
Attending: ORTHOPAEDIC SURGERY
Payer: MEDICARE

## 2025-02-14 PROCEDURE — 97112 NEUROMUSCULAR REEDUCATION: CPT

## 2025-02-14 PROCEDURE — 97110 THERAPEUTIC EXERCISES: CPT

## 2025-02-14 NOTE — FLOWSHEET NOTE
AD    Balance:  [x] WNL      [] NT       [x] Dysfunction noted  Comment:     Falls Risk Assessment (30 days):   Falls Risk assessed and no intervention required.  Time Up and Go (TUG):   Recorded Time: 7.39\"      Exercises/Interventions     Therapeutic Ex (48337)  resistance Sets/time Reps Notes/Cues/Progressions    UPRIGHT bike  5'  1/30; 2/5   Sit to stand  1 12 1/20: 20\" surface HEP; emphasized L-R WB   IB stretches B gastroc 20\" 3x     HL CLAMS  1/30   LAQ  3# 1/30   Hookline B Hip Add isos     Standing  HIP ABD  1/30   Standing HR   1/30   Standing hamstring curl  1/30   Lateral side stepping along table  Lime green 1/30; 2/10 ^ resist   HOOKLINE R PIRIFORMIS STRETCH  2/5   sIDELYING HIP ABDUCTION 0# 2 8  2/14: MINI RAISES; HEP   mINI SLR FLEXION  WITH STRAP TO ASSIST WITH LIFT 2 10 2/14: hep   hIP FLEXOR STRETCH OFF EOB L   20\" 3 2/14   Standing Trunk supported hip extension/plank multifidus to neutral  2/10   Manual Intervention (48636)  TIME      PROM into hip flexion/ hamstring stretching    1/30 TE   Met TO CORRECT  2/5                        NMR re-education (77897) resistance Sets/time Reps CUES NEEDED   Tandem stance  floor CGA w/table PRN   Paloff Press Double green     Lateral walk out 2 step Blue double     B GH ext Blue 2/10   B GH ext iso with opp GH ext  Blue 2/10   Bridges with Ball squeeze with core set  2 10 2/5; 2/14: PROGRESS TO END RANGE   hOOKLINE WITH tra HEELSIDES TO FULL EXT l le  2/14   Standing aIREX bALANCE NBOS:   EO, EC    B GH flexion    Head turns    Semi-Tandem       : 2/5   Hookline TrA activation with contralateral  UE ext /hip flexion iso between yoga block and opp GH flexion  2/5   FWD foot taps L SLS 6\"  2/10                 Hookline TrA activation with ipsilateral  UE ext /hip flexion iso between yoga block and opp GH flexion  2/5   Standing TKE Lime green 2 10 L 2/14   SAQ  15 l 2/14   rAMP ASCEND/DESCEND SLOWLY FOCUSING ON ECCENTRIC QUAD  10 2/14         Therapeutic

## 2025-02-17 ENCOUNTER — HOSPITAL ENCOUNTER (OUTPATIENT)
Dept: PHYSICAL THERAPY | Age: 75
Setting detail: THERAPIES SERIES
Discharge: HOME | End: 2025-02-17
Attending: ORTHOPAEDIC SURGERY
Payer: MEDICARE

## 2025-02-17 PROCEDURE — 97112 NEUROMUSCULAR REEDUCATION: CPT

## 2025-02-17 PROCEDURE — 97530 THERAPEUTIC ACTIVITIES: CPT

## 2025-02-17 PROCEDURE — 97110 THERAPEUTIC EXERCISES: CPT

## 2025-02-17 NOTE — PLAN OF CARE
musculoskeletal condition(s) with a corresponding ICD-10 code that is of complexity and severity that require skilled therapeutic intervention. This has a direct and significant impact on the need for therapy and significantly impacts the rate of recovery.       Return to Play: NA    Prognosis for POC: [x] Good [] Fair  [] Poor    Patient requires continued skilled intervention: [x] Yes - post-operatively [] No      CHARGE CAPTURE     PT CHARGE GRID   CPT Code (TIMED) minutes # CPT Code (UNTIMED) #     Therex (47699)  15 1  EVAL:LOW (88380 - Typically 20 minutes face-to-face)     Neuromusc. Re-ed (59151) 17 1  Re-Eval (01050)     Manual (94823)    Estim Unattended (58184)     Ther. Act (66793) 10 1  Mech. Traction (75384)     Gait (47294)    Dry Needle 1-2 muscle (51351)     Aquatic Therex (28353)    Dry Needle 3+ muscle (20561)     Iontophoresis (17986)    VASO (63880)     Ultrasound (35666)    Group Therapy (36976)     Estim Attended (81524)    Canalith Repositioning (20151)     Physical Performance Test (83172)    Custom orthotic ()     Other:    Other:    Total Timed Code Tx Minutes 42 3       Total Treatment Minutes 42        Charge Justification:  (61435) THERAPEUTIC EXERCISE - Provided verbal/tactile cueing for activities related to strengthening, flexibility, endurance, ROM performed to prevent loss of range of motion, maintain or improve muscular strength or increase flexibility, following either an injury or surgery.   (59433) HOME EXERCISE PROGRAM - Reviewed/Progressed HEP activities related to strengthening, flexibility, endurance, ROM performed to prevent loss of range of motion, maintain or improve muscular strength or increase flexibility, following either an injury or surgery.  (63663) NEUROMUSCULAR RE-EDUCATION - Therapeutic procedure, 1 or more areas, each 15 minutes; neuromuscular reeducation of movement, balance, coordination, kinesthetic sense, posture, and/or proprioception for sitting

## 2025-03-10 ENCOUNTER — HOSPITAL ENCOUNTER (OUTPATIENT)
Dept: PHYSICAL THERAPY | Age: 75
Setting detail: THERAPIES SERIES
Discharge: HOME | End: 2025-03-10
Attending: ORTHOPAEDIC SURGERY
Payer: MEDICARE

## 2025-03-10 ENCOUNTER — OFFICE VISIT (OUTPATIENT)
Dept: ORTHOPEDIC SURGERY | Age: 75
End: 2025-03-10

## 2025-03-10 VITALS — RESPIRATION RATE: 12 BRPM | WEIGHT: 153 LBS | BODY MASS INDEX: 25.49 KG/M2 | HEIGHT: 65 IN

## 2025-03-10 DIAGNOSIS — M16.12 PRIMARY OSTEOARTHRITIS OF LEFT HIP: ICD-10-CM

## 2025-03-10 DIAGNOSIS — Z96.642 STATUS POST HIP REPLACEMENT, LEFT: Primary | ICD-10-CM

## 2025-03-10 DIAGNOSIS — M76.892 HIP ABDUCTOR TENDONITIS, LEFT: ICD-10-CM

## 2025-03-10 PROCEDURE — 99024 POSTOP FOLLOW-UP VISIT: CPT | Performed by: ORTHOPAEDIC SURGERY

## 2025-03-10 PROCEDURE — 97112 NEUROMUSCULAR REEDUCATION: CPT

## 2025-03-10 PROCEDURE — 97110 THERAPEUTIC EXERCISES: CPT

## 2025-03-10 NOTE — FLOWSHEET NOTE
outpatient physical therapy after surgery. Education regarding early mobility post-operatively in the hospital and emphasis on working with both physical therapy and nursing staff to achieve functional mobility to safely navigate at home. Also, education regarding goals and expectations was provided; specifically, maximizing ROM to promote improved gait mechanics and ambulation. The patient was educated about all applicable post-op restrictions and precautions. The patient was encouraged to utilize ice/cold pack after surgery to address pain, minimize swelling as often as possible. It is in my medical opinion that this patient is clear from all physical barriers prior to consideration for surgery, activity modifications prior to and post operatively have been discussed with this patient as well as discharge planning and is cleared for surgery from physical therapy perspective.    Modalities:    No modalities applied this session    Education/Home Exercise Program: Access Code: [email or web address]  Access Code: [email or web address]   1/20: POst-op : Gluteals isos, SAQ, Standing heel raises, standing HS cirls, standing marches,standing L Hip AB ankle pumps      Access Code: [email or web address]  URL: [email or web address]  Date: 02/14/2025  Prepared by: Faby Watts    Exercises  - Standing Terminal Knee Extension with Resistance  - 1 x daily - 7 x weekly - 3 sets - 10 reps  - Sidelying Hip Abduction  - 1 x daily - 7 x weekly - 3 sets - 10 reps  - Small Range Straight Leg Raise  - 1 x daily - 7 x weekly - 3 sets - 10 reps  - Supine Quadriceps Stretch with Strap on Table  - 1 x daily - 7 x weekly - 1 sets - 3-4 reps - 20: hold    ASSESSMENT     Assessment:   Pt. is a 74 y.o. female presenting today to Outpatient PT with signs and symptoms consistent with post-op L THR via anterior approach with DECREASED L HIP STRENGTH/ROM LIMITING PAIN-FREE FUNCTIONAL TRANSFERS, STATIC POSTURES, SLEEPING, GAIT/STAIRS.  PATIENT WILL LIKELY BENEFIT FROM SKILLED

## 2025-03-10 NOTE — PROGRESS NOTES
History of Present Illness:  Scarlett Doran is a pleasant 74 y.o. female who presents for a post operative visit. She is 7  weeks out following a left total hip arthroplasty, direct anterior approach. Overall She is doing okay well but has mild pain from a recent trip to [place]. No setbacks.     She denies fevers, chills, numbness, tingling, and shortness of breath.    Medical History:  Patient's medications, allergies, past medical, surgical, social and family histories were reviewed and updated as appropriate.    No notes on file    Review of Systems  A 14 point review of systems was completed by the patient and is available in the media section of the scanned medical record and was reviewed on 3/10/2025.      Vital Signs:  Vitals:    03/10/25 1210   Resp: 12       General/Appearance: Alert and oriented and in no apparent distress.    Skin:  There are no skin lesions, cellulitis, or extreme edema. The patient has warm and well-perfused Bilateral lower  extremities with brisk capillary refill.      Hip  Exam: left    Inspection: Hip incision(s)  is fully healed. There is no erythema, drainage or other signs of infection    Palpation:  No crepitus to gentle motion / circumduction of the hip    Active Range of Motion: 0-100deg    Passive Range of Motion: 0-100 Ir 15 er 30     Strength:  good glute strength in side lying, fair DL sit-stand squat     Special Tests:  no impingement.    Neurovascular: Sensation to light touch is intact, no motor deficits, palpable radial pulses 2+    Radiology:     Plain radiographs of the left hip comprising 2 views (AP Pelvis, Davis View and False Profile view of the left hip) were obtained and reviewed in the office: Shows postsurgical changes from the left hip arthroplasty. No evidence of acute fracture or complications.    Impression: Stable postop x-ray. Glute enthesophyte    Assessment :  Ms. Scarlett Doran is a pleasant 74 y.o. patient who is 7 week sp left CECY, DAA,

## 2025-03-14 ENCOUNTER — HOSPITAL ENCOUNTER (OUTPATIENT)
Dept: PHYSICAL THERAPY | Age: 75
Setting detail: THERAPIES SERIES
Discharge: HOME | End: 2025-03-14
Attending: ORTHOPAEDIC SURGERY
Payer: MEDICARE

## 2025-03-14 PROCEDURE — 97110 THERAPEUTIC EXERCISES: CPT

## 2025-03-14 PROCEDURE — 97112 NEUROMUSCULAR REEDUCATION: CPT

## 2025-03-14 NOTE — FLOWSHEET NOTE
[facility] - Outpatient Rehabilitation and Therapy [address], [place] [address] office: [phone] fax: [phone]          Physical Therapy: TREATMENT/PROGRESS NOTE   Patient: Scarlett Doran (74 y.o. female)   Examination Date: 2025   :  1950 MRN: [id number]   Visit #: 11   Insurance Allowable Auth Needed   Medicare []Yes    [x]No    Insurance: Payor: MEDICARE / Plan: MEDICARE PART A AND B / Product Type: *No Product type* /   Insurance ID: [id number] - (Medicare)  Secondary Insurance (if applicable): [facility]   Treatment Diagnosis:     ICD-10-CM    1. Weakness of left hip  R29.898       2. Impaired functional mobility, balance, gait, and endurance  Z74.09          Medical Diagnosis:  Primary osteoarthritis of left hip [M16.12]   Referring Physician: Natalia Delgado MD  PCP: Modesto Alvares MD     Plan of care signed (Y/N):   Date of Patient follow up with Physician:      Plan of Care Report: NO  POC update due: (10 visits /OR AUTH LIMITS, whichever is less 6 week functional scores NPV; 3/19/ 2025                                               Medical History:  Comorbidities:  Hypertension  Other Cardio/Pulmonary Conditions: A-FIB  Prior Surgeries: RTC REPAIR, BUNIONECTOMY  Relevant Medical History: SEE ABOVE                                         Precautions/ Contra-indications:           Latex allergy:  NO  Pacemaker:    NO  Contraindications for Manipulation: recent surgical history (relative)  Date of Surgery: 2025  Other:    Red Flags:  None    Suicide Screening:   The patient did not verbalize a primary behavioral concern, suicidal ideation, suicidal intent, or demonstrate suicidal behaviors.    Preferred Language for Healthcare:   [x] English       [] other:    SUBJECTIVE EXAMINATION     Patient stated complaint:3/14: Less sore from vacation sitting in lounge chairs, riding in car/airplanes.  Has been more complaint with neitral hips

## 2025-03-17 ENCOUNTER — HOSPITAL ENCOUNTER (OUTPATIENT)
Dept: PHYSICAL THERAPY | Age: 75
Setting detail: THERAPIES SERIES
Discharge: HOME | End: 2025-03-17
Attending: ORTHOPAEDIC SURGERY
Payer: MEDICARE

## 2025-03-17 PROCEDURE — 97530 THERAPEUTIC ACTIVITIES: CPT

## 2025-03-17 PROCEDURE — 97110 THERAPEUTIC EXERCISES: CPT

## 2025-03-17 PROCEDURE — 97112 NEUROMUSCULAR REEDUCATION: CPT

## 2025-03-17 NOTE — PLAN OF CARE
following: body structures, functions (impairments), activity limitations, and/or participation restrictions  [x] A clinical presentation with stable and/or uncomplicated characteristics   [x] Clinical decision making of LOW (67045 - Typically 20 minutes face-to-face) complexity using standardized patient assessment instrument and/or measurable assessment of functional outcome.     Today's Assessment:SEE COMMENTS ABOVE  Medical Necessity Documentation:  I certify that this patient meets the below criteria necessary for medical necessity for care and/or justification of therapy services:  The patient has functional impairments and/or activity limitations and would benefit from continued outpatient therapy services to address the deficits outlined in the patients goals  The patient has a musculoskeletal condition(s) with a corresponding ICD-10 code that is of complexity and severity that require skilled therapeutic intervention. This has a direct and significant impact on the need for therapy and significantly impacts the rate of recovery.       Return to Play: NA    Prognosis for POC: [x] Good [] Fair  [] Poor    Patient requires continued skilled intervention: [x] Yes - post-operatively [] No      CHARGE CAPTURE     PT CHARGE GRID   CPT Code (TIMED) minutes # CPT Code (UNTIMED) #     Therex (35484)  25 2  EVAL:LOW (80872 - Typically 20 minutes face-to-face)     Neuromusc. Re-ed (74101) 12 1  Re-Eval (94521)     Manual (24801) 4   Estim Unattended (73721)     Ther. Act (54256) 12 1  [facility]. Traction (56601)     Gait (05543)    Dry Needle 1-2 muscle (20560)     Aquatic Therex (37009)    Dry Needle 3+ muscle (20561)     Iontophoresis (12005)    VASO (38190)     Ultrasound (32226)    Group Therapy (46817)     Estim Attended (12778)    Canalith Repositioning (44592)     Physical Performance Test (99301)    Custom orthotic ()     Other:    Other:    Total Timed Code Tx Minutes 53 4       Total Treatment Minutes 42

## 2025-03-21 ENCOUNTER — HOSPITAL ENCOUNTER (OUTPATIENT)
Dept: PHYSICAL THERAPY | Age: 75
Setting detail: THERAPIES SERIES
Discharge: HOME | End: 2025-03-21
Attending: ORTHOPAEDIC SURGERY
Payer: MEDICARE

## 2025-03-21 PROCEDURE — 97110 THERAPEUTIC EXERCISES: CPT

## 2025-03-21 PROCEDURE — 97112 NEUROMUSCULAR REEDUCATION: CPT

## 2025-03-21 NOTE — FLOWSHEET NOTE
to shopping/going out in community for leisure for up to 1.5 hours without increased symptoms or restriction, with 1 sitting break. 1 HOUR  [x] Progressing: [] Met: [x] Not Met: [] Adjusted  5. Patient to return to walking up to 2 miles with 3-4 brief seated rest breaks on level surfaces without increased pain by discharge.self reports being able to walk a mile with FATIGUE AT END WITHOUT A  DEVICE   [x] Progressing: [] Met: [x] Not Met: [] Adjusted             TREATMENT PLAN     Frequency/Duration: re-EVAL AND THEN 2x/week for 8-10 weeks for the following treatment interventions:    Interventions:  Therapeutic Exercise (50792) including: strength training, ROM, and functional mobility  Therapeutic Activities (82053) including: functional mobility training and education.  Neuromuscular Re-education (72841) activation and proprioception, including postural re-education.    Gait Training (25340) for normalization of ambulation patterns and AD training.   Manual Therapy (61490) as indicated to include: Passive Range of Motion, Gr I-IV mobilizations, Soft Tissue Mobilization, Dry Needling/IASTM, Trigger Point Release, and Myofascial Release  Modalities as needed that may include: Cryotherapy, Electrical Stimulation, Thermal Agents, and Vasoneumatic Compression  Patient education on joint protection, postural re-education, and activity modification  Aquatic Therapy (35925)    Plan: Cont POC- Continue emphasis/focus on exercise progression, improving proper muscle recruitment and activation/motor control patterns, modulating pain, promoting relaxation, increasing ROM, reduce/eliminate soft tissue swelling/inflammation/restriction, and static and dynamic balance. Next visit plan to progress weights, progress reps, progress balance, and do POC ;   Electronically Signed by NAFISA NASSAR, PT [id number]   Date: 03/21/2025       Note: Portions of this note have been templated and/or copied from initial evaluation, reassessments

## 2025-03-24 ENCOUNTER — HOSPITAL ENCOUNTER (OUTPATIENT)
Dept: PHYSICAL THERAPY | Age: 75
Setting detail: THERAPIES SERIES
Discharge: HOME | End: 2025-03-24
Attending: ORTHOPAEDIC SURGERY
Payer: MEDICARE

## 2025-03-24 PROCEDURE — 97110 THERAPEUTIC EXERCISES: CPT

## 2025-03-24 PROCEDURE — 97112 NEUROMUSCULAR REEDUCATION: CPT

## 2025-03-24 NOTE — FLOWSHEET NOTE
about all applicable post-op restrictions and precautions. The patient was encouraged to utilize ice/cold pack after surgery to address pain, minimize swelling as often as possible. It is in my medical opinion that this patient is clear from all physical barriers prior to consideration for surgery, activity modifications prior to and post operatively have been discussed with this patient as well as discharge planning and is cleared for surgery from physical therapy perspective.    Modalities:    No modalities applied this session    Education/Home Exercise Program: Access Code: [email or web address]  Acceccess Code: [email or web address]  URL: [email or web address]  Date: 03/21/2025  Prepared by: Faby Watts    Exercises  - Sidelying Hip Abduction  - 1 x daily - 7 x weekly - 3 sets - 10 reps  - Prone Hip Extension on Table  - 1 x daily - 7 x weekly - 1 sets - 20-30 reps  - Standing Hip Abduction with Counter Support  - 1 x daily - 7 x weekly - 3 sets - 10 reps  - Half Kneeling Hip Flexor Stretch  - 1 x daily - 7 x weekly - 1 sets - 3 reps - 20\" hold  - Lunge Onto BOSU® Ball  - 1 x daily - 7 x weekly - 1 sets - 10 reps - 8-10\" hold  - Shoulder Extension with Resistance - Palms Forward  - 1 x daily - 7 x weekly - 3 sets - 10 reps  - Lateral Step Up  - 1 x daily - 7 x weekly - 2 sets - 10 reps    ASSESSMENT     Assessment:   Pt. is a 74 y.o. female presenting today to Outpatient PT with signs and symptoms consistent with post-op L THR via anterior approach with DECREASED L HIP STRENGTH/ROM LIMITING PAIN-FREE FUNCTIONAL TRANSFERS, STATIC POSTURES, SLEEPING, GAIT/STAIRS.  PATIENT WILL LIKELY BENEFIT FROM SKILLED PT TO ADDRESS HER IMAPIRMENTS AND FUNCTIONAL LIMITATIONS TO RESTORE HER TO HER PLOF FOLLOWING RE-EVALUATION AFTER L THR. .    Pt. presents with the functional impairments and activity limitations listed below and would benefit from Outpatient PT to address the below impairments as well as improve pain, and restore function.

## 2025-03-26 ENCOUNTER — HOSPITAL ENCOUNTER (OUTPATIENT)
Dept: PHYSICAL THERAPY | Age: 75
Setting detail: THERAPIES SERIES
Discharge: HOME | End: 2025-03-26
Attending: ORTHOPAEDIC SURGERY
Payer: MEDICARE

## 2025-03-26 PROCEDURE — 97112 NEUROMUSCULAR REEDUCATION: CPT

## 2025-03-26 PROCEDURE — 97110 THERAPEUTIC EXERCISES: CPT

## 2025-03-26 NOTE — FLOWSHEET NOTE
[facility] - Outpatient Rehabilitation and Therapy [address], [place] [address] office: [phone] fax: [phone]        Physical Therapy: TREATMENT/PROGRESS NOTE   Patient: Scarlett Doran (74 y.o. female)   Examination Date: 2025   :  1950 MRN: [id number]   Visit #: 15   Insurance Allowable Auth Needed   Medicare []Yes    [x]No    Insurance: Payor: MEDICARE / Plan: MEDICARE PART A AND B / Product Type: *No Product type* /   Insurance ID: [id number] - (Medicare)  Secondary Insurance (if applicable): [facility]   Treatment Diagnosis:     ICD-10-CM    1. Weakness of left hip  R29.898       2. Impaired functional mobility, balance, gait, and endurance  Z74.09          Medical Diagnosis:  Primary osteoarthritis of left hip [M16.12]   Referring Physician: Natalia Delgado MD  PCP: Modesto Alvares MD     Plan of care signed (Y/N):   Date of Patient follow up with Physician:      Plan of Care Report: NO  POC update due: (10 visits /OR AUTH LIMITS,                                             Medical History:  Comorbidities:  Hypertension  Other Cardio/Pulmonary Conditions: A-FIB  Prior Surgeries: RTC REPAIR, BUNIONECTOMY  Relevant Medical History: SEE ABOVE                                         Precautions/ Contra-indications:           Latex allergy:  NO  Pacemaker:    NO  Contraindications for Manipulation: recent surgical history (relative)  Date of Surgery: 2025  Other:    Red Flags:  None    Suicide Screening:   The patient did not verbalize a primary behavioral concern, suicidal ideation, suicidal intent, or demonstrate suicidal behaviors.    Preferred Language for Healthcare:   [x] English       [] other:    SUBJECTIVE EXAMINATION     Patient stated complaint:  3/24: Patient states she has been working on her HEP; stuggles with the hip flexor stretch at home even with her knee on the pillow.  Trying to demonstrate her FWD lunge technique to

## 2025-03-31 ENCOUNTER — HOSPITAL ENCOUNTER (OUTPATIENT)
Dept: PHYSICAL THERAPY | Age: 75
Setting detail: THERAPIES SERIES
Discharge: HOME | End: 2025-03-31
Attending: ORTHOPAEDIC SURGERY
Payer: MEDICARE

## 2025-03-31 PROCEDURE — 97112 NEUROMUSCULAR REEDUCATION: CPT

## 2025-03-31 PROCEDURE — 97110 THERAPEUTIC EXERCISES: CPT

## 2025-03-31 NOTE — FLOWSHEET NOTE
(00801)    Custom orthotic ()     Other:    Other:    Total Timed Code Tx Minutes 40 3       Total Treatment Minutes 40        Charge Justification:  (75801) THERAPEUTIC EXERCISE - Provided verbal/tactile cueing for HEP and/or activities related to strengthening, flexibility, endurance, ROM performed to prevent loss of range of motion, maintain or improve muscular strength or increase flexibility, following either an injury or surgery.   (33282) NEUROMUSCULAR RE-EDUCATION - Provided therapeutic procedure on activities related to neuromuscular reeducation of movement, balance, coordination, kinesthetic sense, posture, and/or proprioception for sitting and/or standing activities. Provided HEP review and/or progression.      GOALS     GOALS:  Patient stated goal: BE ABLE TO RECEIVE EXERCISES PRIOR TO L THR TO STRENGTHEN L LE  [x] Progressing: [] Met: [] Not Met: [] Adjusted    Therapist goals for Patient:   Short Term Goals: To be achieved in: 1-2 visit(s)  1. Independent in HEP and progression per patient tolerance, in order to prevent re-injury.   [] Progressing: [x] Met: [] Not Met: [] Adjusted  2. All patient questions regarding expectations for rehab following upcoming surgery are answered.   [] Progressing: [x] Met: [] Not Met: [] Adjusted      Long Term Goals: to be met in 8 weeks  1. Disability index score of 10-15% or less for the WOMAC to assist with reaching prior level of function with activities such aS TRANSFERRING SIT TO STAND WITHOUT DIFFICULTY OR PAIN FROM 18\" CHAIR BY DISCHARGE.15%   [] Progressing: [x] Met: [] Not Met: [] Adjusted  2. Patient will demonstrate increased AROM of l hip flexion to 110 degrees  without pain to allow for proper joint functioning to enable patient to perform car transfers without pain/difficulty.   [] Progressing: [x] Met: [] Not Met: [] Adjusted  3. Patient will demonstrate increased Strength of l hip/knee to at least 4/5 throughout without pain to allow for proper

## 2025-04-01 NOTE — PROGRESS NOTES
The sensors were sent on March 27, 2025.  We will send them again.   Information:  Referring Practitioner: Dr. Esequiel Cartagena of care signed (Y/N): [x]  Yes []  No    Date of Patient follow up with Physician:      Progress Report: [x]  Yes Discharge []  No     Date Range for reporting period:  Beginnin2021  PN: 2021  Endin/3/2021  Progress report due (10 Rx/or 30 days whichever is [facility]):4/3  /Recertification due (POC duration/ or 90 days whichever is less):  8/3    /Visit # Suresh Auth required? Date Range   14/15- Medicare [address]  []  Yes  [x]  No      Latex Allergy:  [x]NO      []YES  Preferred Language for Healthcare:   [x]English       []other:    Functional Scale:       Date assessed:    Oswestry: raw score = 59; dysfunction = 26.25%  2021   Oswestry: raw score = 8; dysfunction = 16.00%  2021  Oswestry: raw score = 4; dysfunction = 8.00%  2021     Pain level; 0/10 Outer L thigh     Lumbar MRI results:   CONCLUSION:   1. At L3-4, minimal, grade 1 retrolisthesis. Left foraminal protrusion with thin annular    fissure and low-grade left foraminal stenosis.  Mild to moderate right and mild left facet    hypertrophy with ligamentum flavum thickening. 2. At L4-5, disc space narrowing. Left foraminal protrusion with low-grade left foraminal    stenosis. Right parasagittal to extraforaminal mixed spondylotic protrusion, with adjacent    endplate hypertrophy.  Mild to moderate right foraminal stenosis with mild abutment of exiting    right L4 nerve root. Right lateral recess stenosis and mild abutment of transiting right L5    nerve root. Mild bilateral facet hypertrophy and ligamentum flavum thickening. Low-grade spinal    stenosis. 3. At L5-S1, left foraminal mixed spondylotic protrusion with adjacent endplate hypertrophy.     Low-grade left foraminal stenosis.  Right foraminal mixed spondylotic displacement with adjacent    endplate hypertrophy.  Mild to moderate right foraminal stenosis with abutment of exiting    right L5 nerve root. LE MRI 4/14:  CONCLUSION:   1. Low-grade left hip chondromalacia. 2. Short, thin anterolateral left acetabular labral tear.  Separate posterior labral tear with    a large 5.5 x 1.3 x 1.4 cm paralabral cyst extending posterior to the left acetabulum and deep    to the left gluteus medius muscle. 3. Mild to moderate distal left gluteus minimus tendinosis with few small, thin/low-grade    partial-thickness and interstitial tears.  Low-grade distal left gluteus medius tendon strain,    with an associated 1.5 x 0.4 x 1 cm chronic avulsion fracture fragment near the left greater    trochanter. 4. Low-grade left hamstring origin tendinosis with mild left ischiofemoral impingement       SUBJECTIVE  8/3: Pay=kristofer states that she has had some increased pain since the last PT session with numbness/tingling in her left leg. She states that she thinks this was possibly due to the unilateral bridges she did. Se states she felt a little unstable performing them in her spine. Prior to last episode she only hd intermittent mild achiness,OBJECTIVE:  8/3: large TP in R medial piriformis and lateral piriformis; gluteus minimus;  Posture: L5 ERSL, LOL sacral torsion, L4 FRSL, L SI posterior innomiante SI, R SI naterior innominate SI      RESTRICTIONS/PRECAUTIONS: dizziness, cervical DDD, L hip labral tears, see mRIs above     Exercises/Interventions:     Therapeutic Exercise (91615) Resistance / level Sets / Seconds Reps Notes / Cues   Hookline B Hip ER lime 2 8 HEP   Hookline R and R Hip ER only; L iso lime 1 10 HEP   Hookline Bridges LIME 2 12 HEP    1 12 HEP    1 10 L/R HEP   Clam shellLime 2 10 R    L B squats  70#    R Single leg   L Single Leg  3      1  1 10      10  10 5/13   Standing HS and hip flex  Stretch at step  30\" 2 L/R     Nustep Level 1 5.5'  S=10, arms = 10, L4   Mat ex  Fig 4 piriformis and ITB stretches    3   30\"R/L    Recumbent Bike  5'     L       SL bridges  2 5 L/R 6/15 added Sustained SL with Contralateral LAQ  2 10 6/15 added   \"    1  1   10  10 6/15 added   SLR in standing  Flexion  Abduction  extension    2  2  2   10 R/L  10 R/L  10 R/L 7/26^   TRX Squat   2 10    Prone press ups lumbar etxtension  1/10: 10 6/22HEP   IB   Gastoc   Soleus     2  2   30\" R/L  30\" R/L 6/24                        Therapeutic Activities (21745)       4/28/21 Pt was educated on PT POC, Diagnosis, Prognosis, pathomechanics as well as frequency and duration of scheduling future physical therapy appointments. Time was also taken on this day to answer all patient questions and participation in PT.                                       Neuromuscular Re-ed (89366)       Prone alternating leg lifts with multifidus activation  1 20 6/29    2 10 L 5/11   2 10  5/13   Hookline bridge with neutral pelvis 5/13   Total Gym2    1 10    10 L/R 5/18   Standing B GH ExtLime green 2 10 5/18   Blue2 10 5/18   1 10 5/18    1 10 5/20   4\" 2 10 5/25   4\" 2 10 5/25   Neutral pelvic tilt    Single marches /alt    Opp Le marches /Level 1 A       1        1       10 R/L        10 R/L 6/15   Contralateral arm/leg with foam    Ipsilateral arm/leg with foam   1        1   10 R/L        10 R/L Added 6/15   Standing Resisted Back ext with Swiss Ball 5\" 12    Psuedo Plank at table hip ext/multifidus  1 40 L/R 7/9   Modified front plank  20\" 3 7/19   Modified side plank  15\" 1 R/L 7/19   Rocker Board AP rocking    Maintain A/P center balance    M/L rocking    Maintain R/L center balance 1    20\"      1    20\" 10    1      10    1 7/26   Biodex Limits of stability Level 12 57% 7/26   Standing Balance Airex:   Stagger stance L/R    Head turns with stagger stance    Floor:  SLS static 30\"          1        10\" 1 L/R          30 L/R        2 R/L 5/25   Manual Intervention (42109)       Prone PA L1-L5 Grade 3    6/22   GISTM/STM  X10' R sidelying    Lumbar Manip Gapping to R L4/5      SI Manip L unilateral sacral respiration mobs, L sacral Grade 4 mob, sacral nutation respiration mobs      Hip belt mobs , L hip belt lateral, inferior Grade 3 mob      Hip LA distraction R LE     MET to correct     See manual section    x10' 6/8                   Dry needling manual therapy:   5/3 received permission to dry needle pt at gluteals and /or multifidi  from Dr. Shelly Foreman as long as pt tolerates. 8/3 Dry needling manual therapy: consisted on the placement of 3 needles in the following muscles:  L piriformis,  deep hip rotators, gluteus minimus. 75 mm needles were inserted, piston, rotated, and coned to produce intramuscular mobilization. These techniques were used to restore functional range of motion, reduce muscle spasm and induce healing in the corresponding musculature. (45781) Clean Technique was utilized today while applying Dry needling treatment. The treatment sites where cleaned with 70% solution of  isopropyl alcohol . The PT washed their hands and utilized treatment gloves along with hand  prior to inserting the needles. All needles where removed and discarded in the appropriate sharps container. Modalities:   Manual:   8/3: MET to correct L5 ERSL, LOL sacral torsion, L4 FRSL, L SI posterior innomiante SI, R SI naterior innominate SI  MET to correct: 7/26: MET to correct L4 NRRSL/FRSR, L5ERSR.  LOL sacral torsion, R pubic upslip, R SI posterior innominate SI, L SI anterior innominate SI    7/9: MET to correct L5FRSR, ROL sacral torsion, R SI posterior innominate SI  6/28: MET to correct L4 FRSL, L5 FRSL, LEAH sacral torsion, L SI posterio rinnominate SI  6/24: PAs to L TPs of L4/L5; L4/L5 L sidelying Manipulations, PAS L1-L5 GRade III  6/22: After R LA to correct R innominate upslip,  MET to correct W1XNVUN/FRSL, L4ERSL, LOL SACRAL TORSION, L SI POSTERIOR INNOMINATE SI, R SI ANTERIOR INNOMINATE SI  6/15: MET to correct L5 ERSL  6/14: Left Sidelying gapping technique to ERSL at L5,  5/20: After R Leg pull to correct R innominate upslip, MET to correct L5FRSR, LOL sacral torsionR SI posterior innominate SI, L SI anterior innominate SI  5/18: MET to correct L5 FRSR, ROR sacral torsion, R SI posterior innominate SI, L SI anterior innominate SI  5/13: MET to correct L4 FRSR, L5 FRSL, LEAH sacral torsion    Pt. Education:  -pt educated on diagnosis, prognosis and expectations for rehab  -all pt questions were answered    Home Exercise Prorgam:  Access Code: [email or web address]  URL: [email or web address]  Date: 07/09/2021 Condensed this date  Prepared by:  [facility]    Exercises  Sidelying Hip Abduction - 1 x daily - 7 x weekly - 3 sets - 10 reps  Anti-Rotation Sidestepping with Resistance - 1 x daily - 7 x weekly - 3 sets - 10 reps  Standing Row with Anchored Resistance - 1 x daily - 7 x weekly - 3 sets - 10 reps  Shoulder Extension with Resistance - 1 x daily - 7 x weekly - 3 sets - 10 reps  Hooklying Clamshell with Resistance - 1 x daily - 7 x weekly - 3 sets - 10 reps  Hooklying Isometric Clamshell - 1 x daily - 7 x weekly - 2 sets - 10 reps  Supine Bridge with Resistance Band - 1 x daily - 7 x weekly - 2 sets - 10 reps  Prone Hip Extension - 1 x daily - 7 x weekly - 2-3 sets - 10 reps  Supine Alternating Shoulder Flexion - 1 x daily - 7 x weekly - 3 sets - 10 reps        Therapeutic Exercise and NMR EXR  [x] (14379) Provided verbal/tactile cueing for activities related to strengthening, flexibility, endurance, ROM  for improvements in proximal hip and core control with self care, mobility, lifting and ambulation.  [] (14435) Provided verbal/tactile cueing for activities related to improving balance, coordination, kinesthetic sense, posture, motor skill, proprioception  to assist with core control in self care, mobility, lifting, and ambulation.   [] (27178) Therapist is in constant attendance of 2 or more patients providing skilled therapy interventions, but not providing any significant amount of measurable one-on-one time to either patient, for improvements in LE, proximal hip, and core control in self care, mobility, lifting, ambulation and eccentric single leg control. Therapeutic Activities:    [x] (43509 or 55927) Provided verbal/tactile cueing for activities related to improving balance, coordination, kinesthetic sense, posture, motor skill, proprioception and motor activation to allow for proper function  with self care and ADLs  [] (99319) Provided training and instruction to the patient for proper core and proximal hip recruitment and positioning with ambulation re-education     Home Exercise Program:    [x] (52274) Reviewed/Progressed HEP activities related to strengthening, flexibility, endurance, ROM of core, proximal hip and LE for functional self-care, mobility, lifting and ambulation   [] (99169) Reviewed/Progressed HEP activities related to improving balance, coordination, kinesthetic sense, posture, motor skill, proprioception of core, proximal hip and LE for self care, mobility, lifting, and ambulation      Manual Treatments:  PROM / STM / Oscillations-Mobs:  G-I, II, III, IV (PA's, Inf., Post.)  [x] (55775) Provided manual therapy to mobilize proximal hip and LS spine soft tissue/joints for the purpose of modulating pain, promoting relaxation,  increasing ROM, reducing/eliminating soft tissue swelling/inflammation/restriction, improving soft tissue extensibility and allowing for proper ROM for normal function with self care, mobility, lifting and ambulation.        Charges:  Timed Code Treatment Minutes: 46   Total Treatment Minutes: 46       [] EVAL - LOW (63776)   [] EVAL - MOD (85597)  [] EVAL - HIGH (06765)  [] RE-EVAL (25090)  [] UY(45285) x  1    [] Ionto  [] NMR (95801) x 2    [] Vaso  [x] Manual (29983) x 2   [] Ultrasound  [] TA x 1       [] Mech Traction (09652)  [] Aquatic Therapy x      [] ES (un) (84520):   [] Home Management Training x  [] ES(attended) (20137)   [] Dry Needling 1-2 muscles (57690):  [x] Dry Needling Progression has been slowed due to co-morbidities. [] Plan just implemented, too soon to assess goals progression <30days   [] Goals require adjustment due to lack of progress  [] Patient is not progressing as expected and requires additional follow up with physician  [] Other    Persisting Functional Limitations/Impairments:  [x]Sitting [x]Standing   [x]Walking [x]Squatting/bending    [x]Stairs []ADL's    [x]Transfers [x]Reaching  []Housework []Job related tasks  []Driving []Sports/Recreation   []Sleeping []Other:    ASSESSMENT: Pt tolerated treatment well this date. Patient has met all STG and 3/5 LTGS. PAtient had mild setback from previous advanced bridging exercises, but with manual PT and DN this date pain was reduced,  Patient has a complete final HEP to continue to work on increasing strength as well as balance especially on uneven surfaces. Patient is agreeable to PT discharge at this time. Patient is formally discharged from PT at this time. Treatment/Activity Tolerance:  [x] Patient able to complete tx  [] Patient limited by fatique  [] Patient limited by pain  [] Patient limited by other medical complications  [] Other:     Prognosis: [x] Good [] Fair  [] Poor    Patient Requires Follow-up: [] Yes  [x] No    PLAN: See eval. PT 2x / week for 8-10 weeks. [] Continue per plan of care [] Alter current plan (see comments)  [] Plan of care initiated [] Hold pending MD visit [x] Discharge    Electronically signed by: Manjula Case PT, BOOGIE Carter, SPT  Therapist was present, directed the patient's care, made skilled judgement, and was responsible for assessment and treatment of the patient. Note: If patient does not return for scheduled/ recommended follow up visits, this note will serve as a discharge from care along with most recent update on progress.

## 2025-04-02 ENCOUNTER — APPOINTMENT (OUTPATIENT)
Dept: PHYSICAL THERAPY | Age: 75
End: 2025-04-02
Attending: ORTHOPAEDIC SURGERY
Payer: MEDICARE

## 2025-04-07 ENCOUNTER — HOSPITAL ENCOUNTER (OUTPATIENT)
Dept: PHYSICAL THERAPY | Age: 75
Setting detail: THERAPIES SERIES
Discharge: HOME | End: 2025-04-07
Attending: ORTHOPAEDIC SURGERY
Payer: MEDICARE

## 2025-04-07 PROCEDURE — 97112 NEUROMUSCULAR REEDUCATION: CPT

## 2025-04-07 PROCEDURE — 97110 THERAPEUTIC EXERCISES: CPT

## 2025-04-07 NOTE — FLOWSHEET NOTE
122 60 122 125 122     Knee Flexion 120 140 122 140 140 140     Knee Extension 0 0 0 0 0      WOMAC (raw) 17 69 14; 15%        ROM/Strength: (Blank cells denote NT)     AROM L AROM R Notes PROM L PROM R Notes                 HIP Flexion 115 122  115 122     Abduction 35 35  35 35     ER 35 35        IR 40 48        Extension 10 15  Quad 90 Quad               KNEE Flexion 120 140  135 140     Extension 0 0  0 0                   (0-5) MMT L MMT R Notes       HIP Flexion 2- painful 4-     Abduction       ER 4 4+     IR 4 4+     Extension             KNEE Flexion 5 5     Extension 5 5      Observations:  Bandages/Dressings/Incisions: Not Applicable  Edema: no    Palpation:   Patient reported tenderness with palpation  Location:L HIP BURSA, GLUTEUS MEDIUS MUSCLE AND TENDON    Posture:   decreased WB on L    Specific Joint Mobility Testing/Accessory Motions:      Hip:  hypomobile capsular pattern (IR, fl, abd)    Gait:    Pattern: decreased trunk rotation and trendelenburg on left  Assistive Device Used: no AD    Balance:  [x] WNL      [] NT       [x] Dysfunction noted  Comment:     Falls Risk Assessment (30 days):   Falls Risk assessed and no intervention required.  Time Up and Go (TUG):   Recorded Time: 7.39\"      Exercises/Interventions     Therapeutic Ex (58705)  resistance Sets/time Reps Notes/Cues/Progressions   SB bridge GSB    LP BL  75# 2 12 3/26   LP Single Leg squat 45# 1 12 L/R 3/26; 4/7          Quantum leg curl BL 35#;  2 10 4/7^ WEIGHT   Quantum leg curl unilateral   20#,  1 15 L    Quantum leg ext BL 20#; 30# 2 10 4/7^ WEIGHT   Quantum leg ext B concentric, L eccentric 20# 1 10 3/26                UPRIGHT bike  4'  1/30; 2/5   Sit to stand  1 12 1/20: 20\" surface HEP; emphasized L-R WB   IB stretches B gastroc 20\" 3x     HL CLAMS  1/30   LAQ  3# 1/30   Hookline B Hip Add isos     Standing  HIP ABD  1/30   Standing HR  B 1 10x 1/30   Standing hamstring curl  1/30   Standing HR B up, L eccentric down  2 10

## 2025-04-09 ENCOUNTER — HOSPITAL ENCOUNTER (OUTPATIENT)
Dept: PHYSICAL THERAPY | Age: 75
Setting detail: THERAPIES SERIES
Discharge: HOME | End: 2025-04-09
Attending: ORTHOPAEDIC SURGERY
Payer: MEDICARE

## 2025-04-09 PROCEDURE — 97112 NEUROMUSCULAR REEDUCATION: CPT

## 2025-04-09 PROCEDURE — 97110 THERAPEUTIC EXERCISES: CPT

## 2025-04-09 NOTE — FLOWSHEET NOTE
specifically, maximizing ROM to promote improved gait mechanics and ambulation. The patient was educated about all applicable post-op restrictions and precautions. The patient was encouraged to utilize ice/cold pack after surgery to address pain, minimize swelling as often as possible. It is in my medical opinion that this patient is clear from all physical barriers prior to consideration for surgery, activity modifications prior to and post operatively have been discussed with this patient as well as discharge planning and is cleared for surgery from physical therapy perspective.    Modalities:    No modalities applied this session    Education/Home Exercise Program: Access Code: [email or web address]  Acceccess Code: [email or web address]  URL: [email or web address]  Date: 03/21/2025  Prepared by: Faby Watts    Exercises  - Sidelying Hip Abduction  - 1 x daily - 7 x weekly - 3 sets - 10 reps  - Prone Hip Extension on Table  - 1 x daily - 7 x weekly - 1 sets - 20-30 reps  - Standing Hip Abduction with Counter Support  - 1 x daily - 7 x weekly - 3 sets - 10 reps  - Half Kneeling Hip Flexor Stretch  - 1 x daily - 7 x weekly - 1 sets - 3 reps - 20\" hold  - Lunge Onto BOSU® Ball  - 1 x daily - 7 x weekly - 1 sets - 10 reps - 8-10\" hold  - Shoulder Extension with Resistance - Palms Forward  - 1 x daily - 7 x weekly - 3 sets - 10 reps  - Lateral Step Up  - 1 x daily - 7 x weekly - 2 sets - 10 reps    Access Code: [email or web address]  URL: [email or web address]  Date: 04/09/2025  Prepared by: Faby Watts    Exercises  - Marching Bridge  - 1 x daily - 7 x weekly - 3 sets - 10 reps  - Prone Hip Extension with Bent Knee  - 1 x daily - 7 x weekly - 3 sets - 10 reps  - Double Leg Bridge Single Leg Lower  - 1 x daily - 7 x weekly - 3 sets - 10 reps    ASSESSMENT     Assessment:   Pt. is a 74 y.o. female presenting today to Outpatient PT with signs and symptoms consistent with post-op L THR via anterior approach with DECREASED L HIP

## 2025-04-10 ENCOUNTER — PATIENT MESSAGE (OUTPATIENT)
Dept: ORTHOPEDIC SURGERY | Age: 75
End: 2025-04-10

## 2025-04-10 RX ORDER — AMOXICILLIN 500 MG/1
CAPSULE ORAL
Qty: 4 CAPSULE | Refills: 0 | Status: SHIPPED | OUTPATIENT
Start: 2025-04-10

## 2025-04-14 ENCOUNTER — APPOINTMENT (OUTPATIENT)
Dept: PHYSICAL THERAPY | Age: 75
End: 2025-04-14
Attending: ORTHOPAEDIC SURGERY
Payer: MEDICARE

## 2025-04-16 ENCOUNTER — HOSPITAL ENCOUNTER (OUTPATIENT)
Dept: PHYSICAL THERAPY | Age: 75
Setting detail: THERAPIES SERIES
Discharge: HOME | End: 2025-04-16
Attending: ORTHOPAEDIC SURGERY
Payer: MEDICARE

## 2025-04-16 PROCEDURE — 97110 THERAPEUTIC EXERCISES: CPT

## 2025-04-16 PROCEDURE — 97112 NEUROMUSCULAR REEDUCATION: CPT

## 2025-04-16 PROCEDURE — 97530 THERAPEUTIC ACTIVITIES: CPT

## 2025-04-16 NOTE — PLAN OF CARE
(27682)    Plan: Cont POC- Continue emphasis/focus on exercise progression, improving proper muscle recruitment and activation/motor control patterns, modulating pain, promoting relaxation, increasing ROM, reduce/eliminate soft tissue swelling/inflammation/restriction, and static and dynamic balance. Next visit plan to progress weights, progress reps, progress balance, and do POC ;   Electronically Signed by NAFISA NASSAR, PT [id number]   Date: 04/16/2025       Note: Portions of this note have been templated and/or copied from initial evaluation, reassessments and prior notes for documentation efficiency.

## 2025-04-28 ENCOUNTER — HOSPITAL ENCOUNTER (OUTPATIENT)
Dept: PHYSICAL THERAPY | Age: 75
Setting detail: THERAPIES SERIES
Discharge: HOME | End: 2025-04-28
Attending: ORTHOPAEDIC SURGERY
Payer: MEDICARE

## 2025-04-28 PROCEDURE — 97530 THERAPEUTIC ACTIVITIES: CPT

## 2025-04-28 PROCEDURE — 97112 NEUROMUSCULAR REEDUCATION: CPT

## 2025-04-28 NOTE — FLOWSHEET NOTE
necessity for care and/or justification of therapy services:  The patient has functional impairments and/or activity limitations and would benefit from continued outpatient therapy services to address the deficits outlined in the patients goals  The patient has a musculoskeletal condition(s) with a corresponding ICD-10 code that is of complexity and severity that require skilled therapeutic intervention. This has a direct and significant impact on the need for therapy and significantly impacts the rate of recovery.       Return to Play: NA    Prognosis for POC: [x] Good [] Fair  [] Poor    Patient requires continued skilled intervention: [x] Yes - post-operatively [] No      CHARGE CAPTURE     PT CHARGE GRID   CPT Code (TIMED) minutes # CPT Code (UNTIMED) #     Therex (48474)  11 1  EVAL:LOW (42737 - Typically 20 minutes face-to-face)     Neuromusc. Re-ed (27656) 15 1  Re-Eval (83703)     Manual (62356)    Estim Unattended (03635)     Ther. Act (25061) 15 1  Mech. Traction (90665)     Gait (39224)    Dry Needle 1-2 muscle (51084)     Aquatic Therex (23252)    Dry Needle 3+ muscle (20561)     Iontophoresis (22710)    VASO (16316)     Ultrasound (73328)    Group Therapy (86535)     Estim Attended (07898)    Canalith Repositioning (42601)     Physical Performance Test (95407)    Custom orthotic ()     Other:    Other:    Total Timed Code Tx Minutes 41 3       Total Treatment Minutes 41        Charge Justification:  (49260) THERAPEUTIC EXERCISE - Provided verbal/tactile cueing for HEP and/or activities related to strengthening, flexibility, endurance, ROM performed to prevent loss of range of motion, maintain or improve muscular strength or increase flexibility, following either an injury or surgery.   (14973) NEUROMUSCULAR RE-EDUCATION - Provided therapeutic procedure on activities related to neuromuscular reeducation of movement, balance, coordination, kinesthetic sense, posture, and/or proprioception for sitting

## 2025-04-30 ENCOUNTER — HOSPITAL ENCOUNTER (OUTPATIENT)
Dept: PHYSICAL THERAPY | Age: 75
Setting detail: THERAPIES SERIES
Discharge: HOME | End: 2025-04-30
Attending: ORTHOPAEDIC SURGERY
Payer: MEDICARE

## 2025-04-30 PROCEDURE — 97110 THERAPEUTIC EXERCISES: CPT

## 2025-04-30 PROCEDURE — 97112 NEUROMUSCULAR REEDUCATION: CPT

## 2025-04-30 NOTE — PLAN OF CARE
[facility] [address], [place] [address] office: [phone] fax: [phone]  Physical Therapy Re-Certification Plan of Care   Physical Therapy Discharge Summary    Dear Natalia Delgado MD   ,    We had the pleasure of treating the following patient for physical therapy services at [facility].  A summary of our findings can be found in the discharge summary below.  If you have any questions or concerns regarding these findings, please do not hesitate to contact me at the office phone number checked above.  Thank you for the referral.         Total Visits: 21     Recommendation:   [] Hold PT, pending MD visit   [x] Discharge to [facility]. Follow up with PT or MD PRN.     Reason for Discharge:  All Goals achieved           Physical Therapy: TREATMENT/PROGRESS NOTE   Patient: Scarlett Doran (74 y.o. female)   Examination Date: 2025   :  1950 MRN: [id number]   Visit #: 21   Insurance Allowable Auth Needed   Medicare []Yes    [x]No    Insurance: Payor: MEDICARE / Plan: MEDICARE PART A AND B / Product Type: *No Product type* /   Insurance ID: [id number] - (Medicare)  Secondary Insurance (if applicable): [facility]   Treatment Diagnosis:     ICD-10-CM    1. Weakness of left hip  R29.898       2. Impaired functional mobility, balance, gait, and endurance  Z74.09          Medical Diagnosis:  Primary osteoarthritis of left hip [M16.12]   Referring Physician: Natalia Delgado MD  PCP: Modesto Alvares MD     Plan of care signed (Y/N):   Date of Patient follow up with Physician:      Plan of Care Report: YES, Date Range for this report:  to   dISCHARGE                                            Medical History:  Comorbidities:  Hypertension  Other Cardio/Pulmonary Conditions: A-FIB  Prior Surgeries: RTC REPAIR, BUNIONECTOMY  Relevant Medical History: SEE ABOVE                                       09-Apr-2019 17:39

## 2025-06-09 ENCOUNTER — OFFICE VISIT (OUTPATIENT)
Dept: ORTHOPEDIC SURGERY | Age: 75
End: 2025-06-09
Payer: MEDICARE

## 2025-06-09 VITALS — HEIGHT: 65 IN | BODY MASS INDEX: 24.99 KG/M2 | WEIGHT: 150 LBS

## 2025-06-09 DIAGNOSIS — M16.12 PRIMARY OSTEOARTHRITIS OF LEFT HIP: ICD-10-CM

## 2025-06-09 DIAGNOSIS — Z96.642 STATUS POST HIP REPLACEMENT, LEFT: Primary | ICD-10-CM

## 2025-06-09 DIAGNOSIS — M76.892 HIP ABDUCTOR TENDONITIS, LEFT: ICD-10-CM

## 2025-06-09 PROCEDURE — 1159F MED LIST DOCD IN RCRD: CPT

## 2025-06-09 PROCEDURE — 1036F TOBACCO NON-USER: CPT

## 2025-06-09 PROCEDURE — 1123F ACP DISCUSS/DSCN MKR DOCD: CPT

## 2025-06-09 PROCEDURE — G8420 CALC BMI NORM PARAMETERS: HCPCS

## 2025-06-09 PROCEDURE — 1126F AMNT PAIN NOTED NONE PRSNT: CPT

## 2025-06-09 PROCEDURE — 1090F PRES/ABSN URINE INCON ASSESS: CPT

## 2025-06-09 PROCEDURE — G8399 PT W/DXA RESULTS DOCUMENT: HCPCS

## 2025-06-09 PROCEDURE — 99213 OFFICE O/P EST LOW 20 MIN: CPT

## 2025-06-09 PROCEDURE — 3017F COLORECTAL CA SCREEN DOC REV: CPT

## 2025-06-09 PROCEDURE — G8427 DOCREV CUR MEDS BY ELIG CLIN: HCPCS

## 2025-06-09 RX ORDER — AMOXICILLIN 500 MG/1
CAPSULE ORAL
Qty: 4 CAPSULE | Refills: 5 | Status: SHIPPED | OUTPATIENT
Start: 2025-06-09

## 2025-06-10 NOTE — PROGRESS NOTES
post hip replacement, left Yes    Primary osteoarthritis of left hip     Hip abductor tendonitis, left        Office Procedures:  Orders Placed This Encounter   Procedures    XR HIP 2-3 VW W PELVIS LEFT     ROOM 1     Standing Status:   Future     Number of Occurrences:   1     Expected Date:   6/9/2025     Expiration Date:   6/6/2026       Treatment Plan:    Overall Scarlett Doran is doing well.       All of her questions were fully answered today. We would like to see Scarlett Doran back in 6 months for her one year follow-up visit, clinical check, and new xrays.    We suggested prophylactic antibiotics prior to dental procedures to be prescribed by the dentist or our office as needed.     Sincerely,  BEULAH Bagley      06/09/25  9:05 PM

## (undated) DEVICE — SUTURE ETHIBOND EXCEL SZ 5 L30IN NONABSORBABLE GRN L40MM V-37 MB66G

## (undated) DEVICE — Device: Brand: COVER, PERINEAL POST, 12 PK

## (undated) DEVICE — SOLUTION IRRIG 1000ML STRL H2O USP PLAS POUR BTL

## (undated) DEVICE — SOLUTION IRRIG 3000ML 0.9% SOD CHL ARTHROMATIC PLAS CONT

## (undated) DEVICE — 3M™ IOBAN™ 2 ANTIMICROBIAL INCISE DRAPE 6648EZ: Brand: IOBAN™ 2

## (undated) DEVICE — SOLUTION IV 250ML 0.9% SOD CHL PH 5 INJ USP VIAFLX PLAS

## (undated) DEVICE — DUAL CUT SAGITTAL BLADE

## (undated) DEVICE — UNDERPANTS INCONT XL 45-70IN KNIT SEAMLESS DSGN COLOR-CODED

## (undated) DEVICE — GLOVE SURG SZ 6 L12IN FNGR THK79MIL GRN LTX FREE

## (undated) DEVICE — STRIP,CLOSURE,WOUND,MEDI-STRIP,1/2X4: Brand: MEDLINE

## (undated) DEVICE — 3M™ STERI-DRAPE™ U-DRAPE 1015: Brand: STERI-DRAPE™

## (undated) DEVICE — DRESSING TRNSP TEGADERM 1ST AID STYL 4X4.75IN

## (undated) DEVICE — ANTERIOR TOTAL HIP: Brand: MEDLINE INDUSTRIES, INC.

## (undated) DEVICE — BLADE,CARBON-STEEL,11,STRL,DISPOSABLE,TB: Brand: MEDLINE

## (undated) DEVICE — GLOVE SURG SZ 7 L12IN FNGR THK79MIL GRN LTX FREE

## (undated) DEVICE — BIPOLAR SEALER 23-112-1 AQM 6.0: Brand: AQUAMANTYS ®

## (undated) DEVICE — SUTURE VICRYL SZ 2-0 L18IN ABSRB UD CT-1 L36MM 1/2 CIR J839D

## (undated) DEVICE — 3M™ TEGADERM™ TRANSPARENT FILM DRESSING FRAME STYLE, 1626W, 4 IN X 4-3/4 IN (10 CM X 12 CM), 50/CT 4CT/CASE: Brand: 3M™ TEGADERM™

## (undated) DEVICE — GOWN,SURGICAL,AURORA,SLEEVE: Brand: MEDLINE

## (undated) DEVICE — SUTURE ETHIBOND EXCEL SZ 2 L30IN NONABSORBABLE GRN L40MM V-37 MX69G

## (undated) DEVICE — SUTURE ABSORBABLE MONOFILAMENT 1 CTX 36 CM 48 MM VIO PDS +

## (undated) DEVICE — SHEET, ORTHO, SPLIT, STERILE: Brand: MEDLINE

## (undated) DEVICE — SPONGE GZ W4XL8IN COT WVN 12 PLY

## (undated) DEVICE — ADHESIVE SKIN CLOSURE WND 8.661X1.5 IN 22 CM LIQUIBAND SECUR

## (undated) DEVICE — 3D CONTACT DRESSING 1.5IN X 10IN: Brand: THERABOND 3D ANTIMICROBIAL BARRIER SYSTEMS

## (undated) DEVICE — 3M™ IOBAN™ 2 ANTIMICROBIAL INCISE DRAPE 6640EZ: Brand: IOBAN™ 2

## (undated) DEVICE — PIN BNE FIX TEMP L140MM DIA4MM MAKO

## (undated) DEVICE — MARKER SURG SKIN UTIL BLK REG TIP NONSMEARING W/ 6IN RUL

## (undated) DEVICE — GLOVE SURG SZ 55 THK91MIL ORANGE  LTX FREE SYN POLYISOPRENE

## (undated) DEVICE — SYRINGE MED 30ML STD CLR PLAS LUERLOCK TIP N CTRL DISP

## (undated) DEVICE — COTTON UNDERCAST PADDING,CRIMPED FINISH: Brand: WEBRIL

## (undated) DEVICE — GLOVE ORTHO 7   MSG9470

## (undated) DEVICE — KIT TRK HIP PROC VIZADISC

## (undated) DEVICE — DRAPE,UTILTY,TAPE,15X26, 4EA/PK: Brand: MEDLINE

## (undated) DEVICE — SUTURE MONOCRYL STRATAFIX SPRL + SZ 3-0 L18IN ABSRB UD PS-2 SXMP1B107

## (undated) DEVICE — SOLUTION WND IRRIGATION 450 ML 0.5 PVP-I 0.9 NACL

## (undated) DEVICE — BLADE,CARBON-STEEL,10,STRL,DISPOSABLE,TB: Brand: MEDLINE

## (undated) DEVICE — HOLDER SCALP PLAS G STD

## (undated) DEVICE — TRAP FLUID

## (undated) DEVICE — SOLUTION SURG PREP 26 CC PURPREP

## (undated) DEVICE — WAX SURG 2.5GM HEMSTAT BNE BEESWAX PARAFFIN ISO PALMITATE

## (undated) DEVICE — SOLUTION IV 1000ML 0.9% SOD CHL

## (undated) DEVICE — GOWN,SIRUS,POLYRNF,BRTHSLV,XL,30/CS: Brand: MEDLINE

## (undated) DEVICE — KIT DRP FOR RIO ROBOTIC ARM ASST SYS

## (undated) DEVICE — 6619 2 PTNT ISO SYS INCISE AREA&LT;(&GT;&&LT;)&GT;P: Brand: STERI-DRAPE™ IOBAN™ 2

## (undated) DEVICE — KIT INT FIX FEM TIB CKPT MAKOPLASTY

## (undated) DEVICE — 450 ML BOTTLE OF 0.05% CHLORHEXIDINE GLUCONATE IN 99.95% STERILE WATER FOR IRRIGATION, USP AND APPLICATOR.: Brand: IRRISEPT ANTIMICROBIAL WOUND LAVAGE

## (undated) DEVICE — SUTURE PERMA-HAND SZ 2-0 L30IN NONABSORBABLE BLK L26MM SH K833H

## (undated) DEVICE — TOWEL,STOP FLAG GOLD N-W: Brand: MEDLINE